# Patient Record
Sex: FEMALE | Race: WHITE | Employment: OTHER | URBAN - METROPOLITAN AREA
[De-identification: names, ages, dates, MRNs, and addresses within clinical notes are randomized per-mention and may not be internally consistent; named-entity substitution may affect disease eponyms.]

---

## 2017-01-24 DIAGNOSIS — M10.079 IDIOPATHIC GOUT OF FOOT, UNSPECIFIED CHRONICITY, UNSPECIFIED LATERALITY: ICD-10-CM

## 2017-01-24 RX ORDER — SIMVASTATIN 40 MG/1
TABLET, FILM COATED ORAL
Qty: 90 TAB | Refills: 0 | Status: SHIPPED | OUTPATIENT
Start: 2017-01-24 | End: 2017-05-12 | Stop reason: SDUPTHER

## 2017-01-24 RX ORDER — SITAGLIPTIN 100 MG/1
TABLET, FILM COATED ORAL
Qty: 90 TAB | Refills: 4 | Status: SHIPPED | OUTPATIENT
Start: 2017-01-24 | End: 2018-02-09 | Stop reason: SDUPTHER

## 2017-01-24 RX ORDER — ALLOPURINOL 100 MG/1
TABLET ORAL
Qty: 180 TAB | Refills: 2 | Status: SHIPPED | OUTPATIENT
Start: 2017-01-24 | End: 2018-03-29

## 2017-01-24 RX ORDER — FLUTICASONE PROPIONATE AND SALMETEROL 50; 250 UG/1; UG/1
POWDER RESPIRATORY (INHALATION)
Qty: 180 EACH | Refills: 2 | Status: SHIPPED | OUTPATIENT
Start: 2017-01-24 | End: 2018-02-26 | Stop reason: SDUPTHER

## 2017-03-06 RX ORDER — GABAPENTIN 300 MG/1
CAPSULE ORAL
Qty: 90 CAP | Refills: 0 | Status: SHIPPED | OUTPATIENT
Start: 2017-03-06 | End: 2017-04-19 | Stop reason: SDUPTHER

## 2017-04-19 RX ORDER — GABAPENTIN 300 MG/1
CAPSULE ORAL
Qty: 90 CAP | Refills: 0 | Status: SHIPPED | OUTPATIENT
Start: 2017-04-19 | End: 2017-04-24 | Stop reason: SDUPTHER

## 2017-04-24 RX ORDER — GABAPENTIN 300 MG/1
CAPSULE ORAL
Qty: 270 CAP | Refills: 0 | Status: SHIPPED | OUTPATIENT
Start: 2017-04-24 | End: 2019-01-23 | Stop reason: SDUPTHER

## 2017-05-12 RX ORDER — SIMVASTATIN 40 MG/1
TABLET, FILM COATED ORAL
Qty: 90 TAB | Refills: 0 | Status: SHIPPED | OUTPATIENT
Start: 2017-05-12 | End: 2017-08-21 | Stop reason: SDUPTHER

## 2017-08-03 RX ORDER — LISINOPRIL 2.5 MG/1
TABLET ORAL
Qty: 90 TAB | Refills: 0 | Status: SHIPPED | OUTPATIENT
Start: 2017-08-03 | End: 2017-11-14 | Stop reason: SDUPTHER

## 2017-08-21 RX ORDER — GABAPENTIN 300 MG/1
CAPSULE ORAL
Qty: 90 CAP | Refills: 0 | Status: SHIPPED | OUTPATIENT
Start: 2017-08-21 | End: 2017-09-19 | Stop reason: SDUPTHER

## 2017-08-21 RX ORDER — SIMVASTATIN 40 MG/1
TABLET, FILM COATED ORAL
Qty: 90 TAB | Refills: 0 | Status: SHIPPED | OUTPATIENT
Start: 2017-08-21 | End: 2017-11-18 | Stop reason: SDUPTHER

## 2017-09-19 RX ORDER — GABAPENTIN 300 MG/1
CAPSULE ORAL
Qty: 90 CAP | Refills: 0 | Status: SHIPPED | OUTPATIENT
Start: 2017-09-19 | End: 2017-10-11 | Stop reason: SDUPTHER

## 2017-10-11 RX ORDER — GABAPENTIN 300 MG/1
CAPSULE ORAL
Qty: 270 CAP | Refills: 3 | Status: SHIPPED | OUTPATIENT
Start: 2017-10-11 | End: 2018-02-16 | Stop reason: SDUPTHER

## 2017-10-19 RX ORDER — GABAPENTIN 300 MG/1
CAPSULE ORAL
Qty: 90 CAP | Refills: 0 | Status: SHIPPED | OUTPATIENT
Start: 2017-10-19 | End: 2017-12-22 | Stop reason: SDUPTHER

## 2017-11-14 ENCOUNTER — HOSPITAL ENCOUNTER (OUTPATIENT)
Dept: LAB | Age: 57
Discharge: HOME | End: 2017-11-14
Payer: MEDICARE

## 2017-11-14 ENCOUNTER — OFFICE VISIT (OUTPATIENT)
Dept: FAMILY MEDICINE CLINIC | Age: 57
End: 2017-11-14

## 2017-11-14 VITALS
BODY MASS INDEX: 33.86 KG/M2 | HEART RATE: 80 BPM | RESPIRATION RATE: 18 BRPM | DIASTOLIC BLOOD PRESSURE: 100 MMHG | HEIGHT: 62 IN | SYSTOLIC BLOOD PRESSURE: 170 MMHG | OXYGEN SATURATION: 96 % | WEIGHT: 184 LBS | TEMPERATURE: 98.6 F

## 2017-11-14 DIAGNOSIS — Z23 ENCOUNTER FOR IMMUNIZATION: ICD-10-CM

## 2017-11-14 DIAGNOSIS — L30.1 DYSHIDROTIC ECZEMA: ICD-10-CM

## 2017-11-14 DIAGNOSIS — R21 RASH: ICD-10-CM

## 2017-11-14 DIAGNOSIS — N32.81 OAB (OVERACTIVE BLADDER): ICD-10-CM

## 2017-11-14 DIAGNOSIS — R11.0 NAUSEA: ICD-10-CM

## 2017-11-14 DIAGNOSIS — J42 CHRONIC BRONCHITIS, UNSPECIFIED CHRONIC BRONCHITIS TYPE (HCC): ICD-10-CM

## 2017-11-14 DIAGNOSIS — E11.9 TYPE 2 DIABETES MELLITUS WITHOUT COMPLICATION, WITH LONG-TERM CURRENT USE OF INSULIN (HCC): Primary | ICD-10-CM

## 2017-11-14 DIAGNOSIS — I10 ESSENTIAL HYPERTENSION: ICD-10-CM

## 2017-11-14 DIAGNOSIS — N39.3 STRESS INCONTINENCE IN FEMALE: ICD-10-CM

## 2017-11-14 DIAGNOSIS — Z79.4 TYPE 2 DIABETES MELLITUS WITHOUT COMPLICATION, WITH LONG-TERM CURRENT USE OF INSULIN (HCC): Primary | ICD-10-CM

## 2017-11-14 DIAGNOSIS — G43.009 MIGRAINE WITHOUT AURA AND WITHOUT STATUS MIGRAINOSUS, NOT INTRACTABLE: ICD-10-CM

## 2017-11-14 DIAGNOSIS — R00.0 TACHYCARDIA: ICD-10-CM

## 2017-11-14 PROCEDURE — 82043 UR ALBUMIN QUANTITATIVE: CPT

## 2017-11-14 PROCEDURE — 80053 COMPREHEN METABOLIC PANEL: CPT

## 2017-11-14 PROCEDURE — 85025 COMPLETE CBC W/AUTO DIFF WBC: CPT

## 2017-11-14 PROCEDURE — 83036 HEMOGLOBIN GLYCOSYLATED A1C: CPT

## 2017-11-14 RX ORDER — LISINOPRIL 10 MG/1
TABLET ORAL
Qty: 30 TAB | Refills: 1 | Status: SHIPPED | OUTPATIENT
Start: 2017-11-14 | End: 2018-02-19 | Stop reason: SDUPTHER

## 2017-11-14 RX ORDER — BUTALBITAL, ACETAMINOPHEN AND CAFFEINE 50; 325; 40 MG/1; MG/1; MG/1
TABLET ORAL
Qty: 20 TAB | Refills: 2 | Status: SHIPPED | OUTPATIENT
Start: 2017-11-14 | End: 2018-03-29

## 2017-11-14 RX ORDER — CARVEDILOL 6.25 MG/1
TABLET ORAL 2 TIMES DAILY WITH MEALS
COMMUNITY
End: 2018-02-16 | Stop reason: SDUPTHER

## 2017-11-14 RX ORDER — TRIAMCINOLONE ACETONIDE 0.25 MG/G
CREAM TOPICAL 2 TIMES DAILY
Qty: 15 G | Refills: 0 | Status: SHIPPED | OUTPATIENT
Start: 2017-11-14 | End: 2018-04-20

## 2017-11-14 RX ORDER — ONDANSETRON 4 MG/1
4 TABLET, ORALLY DISINTEGRATING ORAL
Qty: 20 TAB | Refills: 1 | Status: SHIPPED | OUTPATIENT
Start: 2017-11-14 | End: 2018-03-29

## 2017-11-14 NOTE — PROGRESS NOTES
Jere Holland is a 64 y.o. female   Chief Complaint   Patient presents with    Medication Evaluation    pt states she has little blisters on her hands and feet which come and go. Pt has not been doing anything for them. Pt also reports nausea and has been going on past few days pt also feels a little congested in her throat. Pt also overdue for diabetes cl elizabeth states she has been managing, discussed proper follow up and pt states she had her son pass away so has not been following up due to this. Reports a Bp med was stopped in hospital at [facility] and will increase lisinopril. Diabetes has been well controlled. Pt also reports something wrong with kidneys and on further discussion pt states she sneezes or coughs and leaks urine. Pt also feels like she has to go to bathroom urinating frequently. Pt also reports her breathing has worsened and pt is using her advair and albuterol, discussed add on spiriva and pt would like to try this. Pt also urged to quit smoking. Given sample of spiriva and first dose self administered under my direction in office. Pt also gets migraine HA's and used fioricet in past with relief requesting refill. [place] [facility] [place] and \"other things\" per pt and we have not been receiving notes, pt would like to establish with cardio in this building on same ER, will refer  she is a 64y.o. year old female who presents for evalution. Reviewed PmHx, RxHx, FmHx, SocHx, AllgHx and updated and dated in the chart. Review of Systems - negative except as listed above in the HPI    Objective:     Vitals:    11/14/17 1500   BP: (!) 170/100   Pulse: 80   Resp: 18   Temp: 98.6 °F (37 °C)   TempSrc: Oral   SpO2: 96%   Weight: 184 lb (83.5 kg)   Height: 5' 2\" (1.575 m)       Current Outpatient Prescriptions   Medication Sig    carvedilol (COREG) 6.25 mg tablet Take  by mouth two (2) times daily (with meals).     triamcinolone acetonide (KENALOG) 0.025 % topical cream Apply  to affected area two (2) times a day. use thin layer    mirabegron ER (MYRBETRIQ) 25 mg ER tablet Take 1 Tab by mouth daily.  lisinopril (PRINIVIL, ZESTRIL) 10 mg tablet TAKE 1 TABLET BY MOUTH EVERY DAY    ondansetron (ZOFRAN ODT) 4 mg disintegrating tablet Take 1 Tab by mouth every eight (8) hours as needed for Nausea.  butalbital-acetaminophen-caffeine (FIORICET, ESGIC) -40 mg per tablet TAKE 1 TABLET BY MOUTH EVERY 6 HOURS AS NEEDED FOR HA, MUST LAST 30 DAYS    tiotropium bromide (SPIRIVA RESPIMAT) 2.5 mcg/actuation inhaler Take 2 Puffs by inhalation daily.  gabapentin (NEURONTIN) 300 mg capsule TAKE 1 CAPSULE BY MOUTH THREE TIMES DAILY    simvastatin (ZOCOR) 40 mg tablet TAKE 1 TABLET BY MOUTH EVERY EVENING    ipratropium-albuterol (COMBIVENT RESPIMAT)  mcg/actuation inhaler Take 1 Puff by inhalation every six (6) hours as needed for Wheezing (Disp 90 days).  JANUVIA 100 mg tablet TAKE 1 TABLET BY MOUTH DAILY    insulin glargine (TOUJEO SOLOSTAR) 300 unit/mL (1.5 mL) inpn 20 Units by SubCUTAneous route nightly.  insulin aspart (NOVOLOG) 100 unit/mL flexpen 10 units QAC SubQ    aspirin (ASPIRIN) 325 mg tablet Take 325 mg by mouth daily.  gabapentin (NEURONTIN) 300 mg capsule TAKE 1 CAPSULE BY MOUTH THREE TIMES DAILY    gabapentin (NEURONTIN) 300 mg capsule TAKE 1 CAPSULE BY MOUTH THREE TIMES DAILY    allopurinol (ZYLOPRIM) 100 mg tablet TAKE 1 TABLET BY MOUTH TWICE DAILY    ADVAIR DISKUS 250-50 mcg/dose diskus inhaler INHALE 1 PUFF BY MOUTH TWICE DAILY    cilostazol (PLETAL) 100 mg tablet TAKE 1 TABLET BY MOUTH DAILY    colchicine (COLCRYS) 0.6 mg tablet 2 tab at first sign of flare then another tab 1 hour later, once flare over take 1 tab daily    acetaminophen-codeine (TYLENOL #3) 300-30 mg per tablet Take 1 Tab by mouth every six (6) hours as needed for Pain. Max Daily Amount: 4 Tabs.     baclofen (LIORESAL) 20 mg tablet Take 1 Tab by mouth three (3) times daily as needed.  insulin lispro (HUMALOG KWIKPEN) 100 unit/mL kwikpen 10 units subQ QAC    HYDROcodone-acetaminophen (NORCO) 5-325 mg per tablet Take 1 Tab by mouth every eight (8) hours as needed for Pain. Max Daily Amount: 3 Tabs.  metFORMIN ER (GLUCOPHAGE XR) 500 mg tablet TAKE 2 TABLET BY MOUTH EVERY EVENING WITH DINNER    insulin NPH (NOVOLIN N, HUMULIN N) 100 unit/mL injection 6 Units by SubCUTAneous route Before breakfast and dinner.  Insulin Needles, Disposable, 31 gauge x 0/94\" ndle For application of insulin.  Blood-Glucose Meter monitoring kit Use as directed. Dispense 1 meter kit. 0 refills. Check twice a day, before each insulin dose.  Insulin Syringes, Disposable, 1 mL syrg 1 Syringe by Does Not Apply route two (2) times a day. Before breakfast and 12 hours later.  Lancets misc For use with glucose monitor.  ranitidine (ZANTAC) 150 mg tablet Take 150 mg by mouth two (2) times daily as needed for Indigestion (Heartburn).  gabapentin (NEURONTIN) 300 mg capsule Take 300 mg by mouth daily.  docusate sodium (COLACE) 100 mg capsule Take 100 mg by mouth daily.  simvastatin (ZOCOR) 40 mg tablet TAKE 1 TABLET BY MOUTH EVERY EVENING (Patient taking differently: Take 40 mg by mouth daily. TAKE 1 TABLET BY MOUTH EVERY EVENING)     No current facility-administered medications for this visit.         Physical Examination: General appearance - alert, well appearing, and in no distress  Mental status - alert, oriented to person, place, and time  Eyes - pupils equal and reactive, extraocular eye movements intact  Chest - clear to auscultation, no wheezes, rales or rhonchi, symmetric air entry  Heart - normal rate, regular rhythm, normal S1, S2, no murmurs, rubs, clicks or gallops  Abdomen - soft, nontender, nondistended, no masses or organomegaly  Extremities - peripheral pulses normal, no pedal edema, no clubbing or cyanosis  Skin - small vesicular lesions on b/l hands and a couple on feet with xerosis of skin      Assessment/ Plan:   Diagnoses and all orders for this visit:    1. Type 2 diabetes mellitus without complication, with long-term current use of insulin (HCC)  -     HEMOGLOBIN A1C WITH EAG  -     MICROALBUMIN, UR, RAND W/ MICROALBUMIN/CREA RATIO  -     METABOLIC PANEL, COMPREHENSIVE    2. Stress incontinence in female  -     mirabegron ER (MYRBETRIQ) 25 mg ER tablet; Take 1 Tab by mouth daily. Use pads  3. Dyshidrotic eczema  -     triamcinolone acetonide (KENALOG) 0.025 % topical cream; Apply  to affected area two (2) times a day. use thin layer  aveeno/cetaphil  4. Rash  -     CBC WITH AUTOMATED DIFF    5. OAB (overactive bladder)  -     mirabegron ER (MYRBETRIQ) 25 mg ER tablet; Take 1 Tab by mouth daily. 6. Essential hypertension  -     lisinopril (PRINIVIL, ZESTRIL) 10 mg tablet; TAKE 1 TABLET BY MOUTH EVERY DAY  -     [facility]    7. Nausea  -     ondansetron (ZOFRAN ODT) 4 mg disintegrating tablet; Take 1 Tab by mouth every eight (8) hours as needed for Nausea. 8. Migraine without aura and without status migrainosus, not intractable  -     butalbital-acetaminophen-caffeine (FIORICET, ESGIC) -40 mg per tablet; TAKE 1 TABLET BY MOUTH EVERY 6 HOURS AS NEEDED FOR HA, MUST LAST 30 DAYS    9. Encounter for immunization  -     Influenza virus vaccine (QUADRIVALENT PRES FREE SYRINGE) IM (09072)  -     Pneumococcal polysaccharide vaccine, 23-valent, adult or immunosuppressed pt dose  -     CA IMMUNIZ ADMIN,1 SINGLE/COMB VAC/TOXOID    10. Tachycardia  -     [place] Card [facility]    11. Chronic bronchitis, unspecified chronic bronchitis type (HCC)  -     tiotropium bromide (SPIRIVA RESPIMAT) 2.5 mcg/actuation inhaler; Take 2 Puffs by inhalation daily. Follow-up Disposition:  Return in about 3 weeks (around 12/5/2017), or if symptoms worsen or fail to improve.     I have discussed the diagnosis with the patient and the intended plan as seen in the above orders. The patient has received an after-visit summary and questions were answered concerning future plans. Pt conveyed understanding of plan.     Medication Side Effects and Warnings were discussed with patient      Emily Nichole DO

## 2017-11-14 NOTE — PATIENT INSTRUCTIONS

## 2017-11-14 NOTE — MR AVS SNAPSHOT
Visit Information Date & Time Provider Department Dept. Phone Encounter #  
 11/14/2017  2:40 PM Elizabeth Mays, Madhu S Eusebio Leone [phone] [id number] Upcoming Health Maintenance Date Due Pneumococcal 19-64 Medium Risk (1 of 1 - PPSV23) 12/28/1979 PAP AKA CERVICAL CYTOLOGY 12/28/1981 HEMOGLOBIN A1C Q6M 4/19/2017 LIPID PANEL Q1 4/20/2017 Influenza Age 5 to Adult 8/1/2017 BREAST CANCER SCRN MAMMOGRAM 9/23/2018 COLONOSCOPY 1/1/2024 DTaP/Tdap/Td series (2 - Td) 3/10/2025 Allergies as of 11/14/2017  Review Complete On: 11/14/2017 By: Elizabeth Mays DO Severity Noted Reaction Type Reactions Ciprofloxacin  05/26/2011    Nausea Only, Vertigo Current Immunizations  Reviewed on 10/19/2016 Name Date Influenza Vaccine (Quad) PF  Incomplete, 10/19/2016, 11/20/2015 Pneumococcal Polysaccharide (PPSV-23)  Incomplete Tdap 3/10/2015 Not reviewed this visit You Were Diagnosed With   
  
 Codes Comments Type 2 diabetes mellitus without complication, with long-term current use of insulin (HCC)    -  Primary ICD-10-CM: E11.9, Z79.4 ICD-9-CM: 250.00, V58.67 Stress incontinence in female     ICD-10-CM: N39.3 ICD-9-CM: 625.6 Dyshidrotic eczema     ICD-10-CM: L30.1 ICD-9-CM: 705.81 Rash     ICD-10-CM: R21 
ICD-9-CM: 782.1   
 OAB (overactive bladder)     ICD-10-CM: N32.81 ICD-9-CM: 596.51 Essential hypertension     ICD-10-CM: I10 
ICD-9-CM: 401.9 Nausea     ICD-10-CM: R11.0 ICD-9-CM: 787.02   
 Migraine without aura and without status migrainosus, not intractable     ICD-10-CM: I42.235 ICD-9-CM: 346.10 Encounter for immunization     ICD-10-CM: X86 ICD-9-CM: V03.89 Tachycardia     ICD-10-CM: R00.0 ICD-9-CM: 670. 0 Chronic bronchitis, unspecified chronic bronchitis type ([address]     ICD-10-CM: T63 ICD-9-CM: 491.9 Vitals BP Pulse Temp Resp Height(growth percentile) Weight(growth percentile) (!) 170/100 80 98.6 °F (37 °C) (Oral) 18 5' 2\" (1.575 m) 184 lb (83.5 kg) SpO2 BMI OB Status Smoking Status 96% 33.65 kg/m2 Postmenopausal Current Every Day Smoker Vitals History BMI and BSA Data Body Mass Index Body Surface Area  
 33.65 kg/m 2 1.91 m 2 Preferred Pharmacy Pharmacy Name Phone [address] [phone] Your Updated Medication List  
  
   
This list is accurate as of: 11/14/17  3:41 PM.  Always use your most recent med list.  
  
  
  
  
 acetaminophen-codeine 300-30 mg per tablet Commonly known as:  TYLENOL #3 Take 1 Tab by mouth every six (6) hours as needed for Pain. Max Daily Amount: 4 Tabs. ADVAIR DISKUS 250-50 mcg/dose diskus inhaler Generic drug:  fluticasone-salmeterol INHALE 1 PUFF BY MOUTH TWICE DAILY  
  
 allopurinol 100 mg tablet Commonly known as:  ZYLOPRIM  
TAKE 1 TABLET BY MOUTH TWICE DAILY  
  
 aspirin 325 mg tablet Commonly known as:  ASPIRIN Take 325 mg by mouth daily. baclofen 20 mg tablet Commonly known as:  LIORESAL Take 1 Tab by mouth three (3) times daily as needed. Blood-Glucose Meter monitoring kit Use as directed. Dispense 1 meter kit. 0 refills. Check twice a day, before each insulin dose. butalbital-acetaminophen-caffeine -40 mg per tablet Commonly known as:  FIORICET, ESGIC  
TAKE 1 TABLET BY MOUTH EVERY 6 HOURS AS NEEDED FOR HA, MUST LAST 30 DAYS  
  
 carvedilol 6.25 mg tablet Commonly known as:  [place]en You Take  by mouth two (2) times daily (with meals). cilostazol 100 mg tablet Commonly known as:  PLETAL  
TAKE 1 TABLET BY MOUTH DAILY COLACE 100 mg capsule Generic drug:  docusate sodium Take 100 mg by mouth daily. colchicine 0.6 mg tablet Commonly known as:  COLCRYS  
2 tab at first sign of flare then another tab 1 hour later, once flare over take 1 tab daily * gabapentin 300 mg capsule Commonly known as:  NEURONTIN Take 300 mg by mouth daily. * gabapentin 300 mg capsule Commonly known as:  NEURONTIN  
TAKE 1 CAPSULE BY MOUTH THREE TIMES DAILY * gabapentin 300 mg capsule Commonly known as:  NEURONTIN  
TAKE 1 CAPSULE BY MOUTH THREE TIMES DAILY * gabapentin 300 mg capsule Commonly known as:  NEURONTIN  
TAKE 1 CAPSULE BY MOUTH THREE TIMES DAILY HYDROcodone-acetaminophen 5-325 mg per tablet Commonly known as:  Adali Lopezene Take 1 Tab by mouth every eight (8) hours as needed for Pain. Max Daily Amount: 3 Tabs. insulin aspart 100 unit/mL Inpn Commonly known as:  [place]m 10 units QAC SubQ  
  
 insulin glargine 300 unit/mL (1.5 mL) Inpn Commonly known as:  TOUJEO SOLOSTAR  
20 Units by SubCUTAneous route nightly. insulin lispro 100 unit/mL kwikpen Commonly known as:  HumaLOG KwikPen 10 units subQ QAC Insulin Needles (Disposable) 31 gauge x 5/16\" Ndle For application of insulin. insulin  unit/mL injection Commonly known as:  NOVOLIN N, HUMULIN N  
6 Units by SubCUTAneous route Before breakfast and dinner. Insulin Syringes (Disposable) 1 mL Syrg 1 Syringe by Does Not Apply route two (2) times a day. Before breakfast and 12 hours later. ipratropium-albuterol  mcg/actuation inhaler Commonly known as:  [place] Take 1 Puff by inhalation every six (6) hours as needed for Wheezing (Disp 90 days). JANUVIA 100 mg tablet Generic drug:  SITagliptin TAKE 1 TABLET BY MOUTH DAILY Lancets [facility] For use with glucose monitor. lisinopril 10 mg tablet Commonly known as:  PRINIVIL, ZESTRIL  
TAKE 1 TABLET BY MOUTH EVERY DAY  
  
 metFORMIN  mg tablet Commonly known as:  GLUCOPHAGE XR  
TAKE 2 TABLET BY MOUTH EVERY EVENING WITH DINNER  
  
 mirabegron ER 25 mg ER tablet Commonly known as:  MYRBETRIQ  
 Take 1 Tab by mouth daily. ondansetron 4 mg disintegrating tablet Commonly known as:  ZOFRAN ODT Take 1 Tab by mouth every eight (8) hours as needed for Nausea. raNITIdine 150 mg tablet Commonly known as:  ZANTAC Take 150 mg by mouth two (2) times daily as needed for Indigestion (Heartburn). * simvastatin 40 mg tablet Commonly known as:  ZOCOR  
TAKE 1 TABLET BY MOUTH EVERY EVENING  
  
 * simvastatin 40 mg tablet Commonly known as:  ZOCOR  
TAKE 1 TABLET BY MOUTH EVERY EVENING  
  
 tiotropium bromide 2.5 mcg/actuation inhaler Commonly known as:  Gavi Holstein Take 2 Puffs by inhalation daily. triamcinolone acetonide 0.025 % topical cream  
Commonly known as:  KENALOG Apply  to affected area two (2) times a day. use thin layer * Notice: This list has 6 medication(s) that are the same as other medications prescribed for you. Read the directions carefully, and ask your doctor or other care provider to review them with you. Prescriptions Printed Refills  
 butalbital-acetaminophen-caffeine (FIORICET, ESGIC) -40 mg per tablet 2 Sig: TAKE 1 TABLET BY MOUTH EVERY 6 HOURS AS NEEDED FOR HA, MUST LAST 30 DAYS Class: Print Prescriptions Sent to Pharmacy Refills  
 triamcinolone acetonide (KENALOG) 0.025 % topical cream 0 Sig: Apply  to affected area two (2) times a day. use thin layer Class: Normal  
 Pharmacy: [facility] [address] [facility] [email or web address] Ph #: [phone] Route: Topical  
 mirabegron ER (MYRBETRIQ) 25 mg ER tablet 5 Sig: Take 1 Tab by mouth daily. Class: Normal  
 Pharmacy: [facility] [address] [facility] [email or web address] Ph #: [phone] Route: Oral  
 lisinopril (PRINIVIL, ZESTRIL) 10 mg tablet 1 Sig: TAKE 1 TABLET BY MOUTH EVERY DAY  Class: Normal  
 Pharmacy: [place] [address] Ph #: [phone]  
 ondansetron (ZOFRAN ODT) 4 mg disintegrating tablet 1 Sig: Take 1 Tab by mouth every eight (8) hours as needed for Nausea. Class: Normal  
 Pharmacy: [facility] [address] [facility] [place] Ph #: [phone] Route: Oral  
 tiotropium bromide (SPIRIVA RESPIMAT) 2.5 mcg/actuation inhaler 5 Sig: Take 2 Puffs by inhalation daily. Class: Normal  
 Pharmacy: [facility] [address] [facility] Ph #: [phone] Route: Inhalation We Performed the Following CBC WITH AUTOMATED DIFF [53949 CPT(R)] HEMOGLOBIN A1C WITH EAG [77268 CPT(R)] INFLUENZA VIRUS VAC QUAD,SPLIT,PRESV FREE SYRINGE IM [id number] CPT(R)] METABOLIC PANEL, COMPREHENSIVE [16747 CPT(R)] MICROALBUMIN, UR, RAND W/ MICROALBUMIN/CREA RATIO [id number] CPT(R)] PNEUMOCOCCAL POLYSACCHARIDE VACCINE, 23-VALENT, ADULT OR IMMUNOSUPPRESSED PT DOSE, [17330 CPT(R)] [place] IMMUNIZ ADMIN,1 SINGLE/COMB VAC/TOXOID [id number] CPT(R)] REFERRAL TO CARDIOLOGY [YBN26 Custom] Referral Information Referral ID Referred By Referred To  
  
 [id number] Krysat Kearns MD   
   [address] [place] [address] [phone] [place], [address] [place] Phone: [phone] Visits Status Start Date End Date 1 New Request 11/14/17 11/14/18 If your referral has a status of pending review or denied, additional information will be sent to support the outcome of this decision. Patient Instructions Stopping Smoking: Care Instructions Your Care Instructions Cigarette smokers crave the nicotine in cigarettes. Giving it up is much harder than simply changing a habit.  Your body has to stop craving the nicotine. It is hard to quit, but you can do it. There are many tools that people use to quit smoking. You may find that combining tools works best for you. There are several steps to quitting. First you get ready to quit. Then you get support to help you. After that, you learn new skills and behaviors to become a nonsmoker. For many people, a necessary step is getting and using medicine. Your doctor will help you set up the plan that best meets your needs. You may want to attend a smoking cessation program to help you quit smoking. When you choose a program, look for one that has proven success. Ask your doctor for ideas. You will greatly increase your chances of success if you take medicine as well as get counseling or join a cessation program. 
Some of the changes you feel when you first quit tobacco are uncomfortable. Your body will miss the nicotine at first, and you may feel short-tempered and grumpy. You may have trouble sleeping or concentrating. Medicine can help you deal with these symptoms. You may struggle with changing your smoking habits and rituals. The last step is the tricky one: Be prepared for the smoking urge to continue for a time. This is a lot to deal with, but keep at it. You will feel better. Follow-up care is a key part of your treatment and safety. Be sure to make and go to all appointments, and call your doctor if you are having problems. It's also a good idea to know your test results and keep a list of the medicines you take. How can you care for yourself at home? · Ask your family, friends, and coworkers for support. You have a better chance of quitting if you have help and support. · Join a support group, such as Nicotine Anonymous, for people who are trying to quit smoking. · Consider signing up for a smoking cessation program, such as the American Lung Association's Freedom from Smoking program. 
· Set a quit date.  Pick your date carefully so that it is not right in the middle of a big deadline or stressful time. Once you quit, do not even take a puff. Get rid of all ashtrays and lighters after your last cigarette. Clean your house and your clothes so that they do not smell of smoke. · Learn how to be a nonsmoker. Think about ways you can avoid those things that make you reach for a cigarette. ¨ Avoid situations that put you at greatest risk for smoking. For some people, it is hard to have a drink with friends without smoking. For others, they might skip a coffee break with coworkers who smoke. ¨ Change your daily routine. Take a different route to work or eat a meal in a different place. · Cut down on stress. Calm yourself or release tension by doing an activity you enjoy, such as reading a book, taking a hot bath, or gardening. · Talk to your doctor or pharmacist about nicotine replacement therapy, which replaces the nicotine in your body. You still get nicotine but you do not use tobacco. Nicotine replacement products help you slowly reduce the amount of nicotine you need. These products come in several forms, many of them available over-the-counter: ¨ Nicotine patches ¨ Nicotine gum and lozenges ¨ Nicotine inhaler · Ask your doctor about bupropion (Wellbutrin) or varenicline (Chantix), which are prescription medicines. They do not contain nicotine. They help you by reducing withdrawal symptoms, such as stress and anxiety. · Some people find hypnosis, acupuncture, and massage helpful for ending the smoking habit. · Eat a healthy diet and get regular exercise. Having healthy habits will help your body move past its craving for nicotine. · Be prepared to keep trying. Most people are not successful the first few times they try to quit. Do not get mad at yourself if you smoke again. Make a list of things you learned and think about when you want to try again, such as next week, next month, or next year. Where can you learn more? Go to http://lauro-oksana.info/. Enter W035 in the search box to learn more about \"Stopping Smoking: Care Instructions. \" Current as of: March 20, 2017 Content Version: 11.4 © 8898-0514 [employer] Care instructions adapted under license by [employer] (which disclaims liability or warranty for this information). If you have questions about a medical condition or this instruction, always ask your healthcare professional. [address] any warranty or liability for your use of this information. Introducing [facility]! Dear Esau Calvillo: Thank you for requesting a [employer] account. Our records indicate that you already have an active [employer] account. You can access your account anytime at [email or web address] Did you know that you can access your hospital and ER discharge instructions at any time in [employer]? You can also review all of your test results from your hospital stay or ER visit. Additional Information If you have questions, please visit the Frequently Asked Questions section of the [employer] website at [email or web address] Remember, [employer] is NOT to be used for urgent needs. For medical emergencies, dial 911. Now available from your iPhone and Android! Please provide this summary of care documentation to your next provider. Your primary care clinician is listed as Tracie Nj. If you have any questions after today's visit, please call [phone].

## 2017-11-15 DIAGNOSIS — E11.65 TYPE 2 DIABETES MELLITUS WITH HYPERGLYCEMIA, WITH LONG-TERM CURRENT USE OF INSULIN (HCC): ICD-10-CM

## 2017-11-15 DIAGNOSIS — Z79.4 TYPE 2 DIABETES MELLITUS WITH HYPERGLYCEMIA, WITH LONG-TERM CURRENT USE OF INSULIN (HCC): ICD-10-CM

## 2017-11-15 LAB
ALBUMIN SERPL-MCNC: 4 G/DL (ref 3.5–5.5)
ALBUMIN/CREAT UR: 381.2 MG/G CREAT (ref 0–30)
ALBUMIN/GLOB SERPL: 1.3 {RATIO} (ref 1.2–2.2)
ALP SERPL-CCNC: 98 IU/L (ref 39–117)
ALT SERPL-CCNC: 7 IU/L (ref 0–32)
AST SERPL-CCNC: 10 IU/L (ref 0–40)
BASOPHILS # BLD AUTO: 0 X10E3/UL (ref 0–0.2)
BASOPHILS NFR BLD AUTO: 0 %
BILIRUB SERPL-MCNC: 0.4 MG/DL (ref 0–1.2)
BUN SERPL-MCNC: 5 MG/DL (ref 6–24)
BUN/CREAT SERPL: 7 (ref 9–23)
CALCIUM SERPL-MCNC: 9.1 MG/DL (ref 8.7–10.2)
CHLORIDE SERPL-SCNC: 103 MMOL/L (ref 96–106)
CO2 SERPL-SCNC: 26 MMOL/L (ref 18–29)
CREAT SERPL-MCNC: 0.75 MG/DL (ref 0.57–1)
CREAT UR-MCNC: 134.7 MG/DL
EOSINOPHIL # BLD AUTO: 0.2 X10E3/UL (ref 0–0.4)
EOSINOPHIL NFR BLD AUTO: 2 %
ERYTHROCYTE [DISTWIDTH] IN BLOOD BY AUTOMATED COUNT: 13.2 % (ref 12.3–15.4)
EST. AVERAGE GLUCOSE BLD GHB EST-MCNC: 134 MG/DL
GFR SERPLBLD CREATININE-BSD FMLA CKD-EPI: 103 ML/MIN/1.73
GFR SERPLBLD CREATININE-BSD FMLA CKD-EPI: 89 ML/MIN/1.73
GLOBULIN SER CALC-MCNC: 3.1 G/DL (ref 1.5–4.5)
GLUCOSE SERPL-MCNC: 231 MG/DL (ref 65–99)
HBA1C MFR BLD: 6.3 % (ref 4.8–5.6)
HCT VFR BLD AUTO: 44 % (ref 34–46.6)
HGB BLD-MCNC: 14.9 G/DL (ref 11.1–15.9)
IMM GRANULOCYTES # BLD: 0 X10E3/UL (ref 0–0.1)
IMM GRANULOCYTES NFR BLD: 0 %
LYMPHOCYTES # BLD AUTO: 3.4 X10E3/UL (ref 0.7–3.1)
LYMPHOCYTES NFR BLD AUTO: 32 %
MCH RBC QN AUTO: 31 PG (ref 26.6–33)
MCHC RBC AUTO-ENTMCNC: 33.9 G/DL (ref 31.5–35.7)
MCV RBC AUTO: 92 FL (ref 79–97)
MICROALBUMIN UR-MCNC: 513.5 UG/ML
MONOCYTES # BLD AUTO: 0.5 X10E3/UL (ref 0.1–0.9)
MONOCYTES NFR BLD AUTO: 5 %
NEUTROPHILS # BLD AUTO: 6.2 X10E3/UL (ref 1.4–7)
NEUTROPHILS NFR BLD AUTO: 61 %
PLATELET # BLD AUTO: 241 X10E3/UL (ref 150–379)
POTASSIUM SERPL-SCNC: 3.9 MMOL/L (ref 3.5–5.2)
PROT SERPL-MCNC: 7.1 G/DL (ref 6–8.5)
RBC # BLD AUTO: 4.8 X10E6/UL (ref 3.77–5.28)
SODIUM SERPL-SCNC: 144 MMOL/L (ref 134–144)
WBC # BLD AUTO: 10.4 X10E3/UL (ref 3.4–10.8)

## 2017-11-15 RX ORDER — INSULIN ASPART 100 [IU]/ML
INJECTION, SOLUTION INTRAVENOUS; SUBCUTANEOUS
Qty: 5 PEN | Refills: 3 | Status: SHIPPED | OUTPATIENT
Start: 2017-11-15 | End: 2019-02-25 | Stop reason: SDUPTHER

## 2017-11-15 NOTE — PROGRESS NOTES
Your diabetes is stable but your urine is showing kidney damage from your diabetes but you are on lisinopril which is protective of your kidneys. You need to follow up with me in the next month to check your blood pressure.

## 2017-11-16 ENCOUNTER — DOCUMENTATION ONLY (OUTPATIENT)
Dept: FAMILY MEDICINE CLINIC | Age: 57
End: 2017-11-16

## 2017-11-16 NOTE — PROGRESS NOTES
Yenny ([id number]) form for Spiriva Respimat completed and placed on Dr. Henrietta Laguna desk for signature then fax.

## 2017-11-20 RX ORDER — SIMVASTATIN 40 MG/1
TABLET, FILM COATED ORAL
Qty: 90 TAB | Refills: 0 | Status: SHIPPED | OUTPATIENT
Start: 2017-11-20 | End: 2018-02-16 | Stop reason: SDUPTHER

## 2017-12-22 RX ORDER — GABAPENTIN 300 MG/1
CAPSULE ORAL
Qty: 90 CAP | Refills: 0 | Status: SHIPPED | OUTPATIENT
Start: 2017-12-22 | End: 2018-02-16 | Stop reason: SDUPTHER

## 2018-02-09 ENCOUNTER — TELEPHONE (OUTPATIENT)
Dept: FAMILY MEDICINE CLINIC | Age: 58
End: 2018-02-09

## 2018-02-09 NOTE — TELEPHONE ENCOUNTER
Pt called. Pt needs a script sent in for head lice. Please call pt at [phone]. Pt stated that she needs her [place] and [place] refilled too. PSR reminded pt that she was supposed to f/u 3wks from last appt. Pt stated that she didn't want to come in until after her appt with Cardio.

## 2018-02-16 ENCOUNTER — OFFICE VISIT (OUTPATIENT)
Dept: CARDIOLOGY CLINIC | Age: 58
End: 2018-02-16

## 2018-02-16 VITALS
HEART RATE: 76 BPM | DIASTOLIC BLOOD PRESSURE: 84 MMHG | BODY MASS INDEX: 34.57 KG/M2 | SYSTOLIC BLOOD PRESSURE: 150 MMHG | OXYGEN SATURATION: 95 % | WEIGHT: 189 LBS

## 2018-02-16 DIAGNOSIS — I10 ESSENTIAL HYPERTENSION: Primary | ICD-10-CM

## 2018-02-16 DIAGNOSIS — E78.00 HYPERCHOLESTEREMIA: ICD-10-CM

## 2018-02-16 DIAGNOSIS — R06.02 SOB (SHORTNESS OF BREATH): ICD-10-CM

## 2018-02-16 RX ORDER — CHOLECALCIFEROL (VITAMIN D3) 125 MCG
CAPSULE ORAL DAILY
COMMUNITY
End: 2020-06-23

## 2018-02-16 RX ORDER — ASPIRIN 81 MG/1
TABLET ORAL DAILY
COMMUNITY

## 2018-02-16 RX ORDER — CARVEDILOL 6.25 MG/1
12.5 TABLET ORAL 2 TIMES DAILY WITH MEALS
Qty: 60 TAB | Refills: 5 | Status: SHIPPED | OUTPATIENT
Start: 2018-02-16 | End: 2018-03-20 | Stop reason: DRUGHIGH

## 2018-02-16 RX ORDER — CLOPIDOGREL BISULFATE 75 MG/1
TABLET ORAL DAILY
COMMUNITY
End: 2018-03-30

## 2018-02-16 NOTE — PROGRESS NOTES
Pt unsure of medications she takes.  Asks her to give med list when she leaves    Pt complains of shortness of breath    Pt complains of lightheadedness     Pt complains of dizziness     Visit Vitals    /84 (BP 1 Location: Right arm, BP Patient Position: Sitting)    Pulse 76    Wt 189 lb (85.7 kg)    SpO2 95%    BMI 34.57 kg/m2

## 2018-02-16 NOTE — MR AVS SNAPSHOT
[address] [address] 
[phone] Patient: Christiano Jaime MRN: [id number] :1960 Visit Information Date & Time Provider Department Dept. Phone Encounter #  
 2018  3:00 PM Aparna Cuevas MD [facility] [phone] [id number] Follow-up Instructions Return in about 6 months (around 2018). Your Appointments 2018  2:00 PM  
ECHO CARDIOGRAMS 2D with VASCULAREDUARDO  
[facility] ([facility] SCHEDULING) Appt Note: echo dr Sotomayor Grain [address] [address]  
[phone]  
  
   
 [address] [address]  
  
    
 3/30/2018  1:00 PM  
ESTABLISHED PATIENT with Aparna Cuevas MD  
[facility] ([address]) Appt Note: 6 wk fu appt sll  
 [address] [address] [address]  
[phone]  
  
   
 [address] [address] [address] [address] Upcoming Health Maintenance Date Due  
 PAP AKA CERVICAL CYTOLOGY 1981 LIPID PANEL Q1 2017 HEMOGLOBIN A1C Q6M 2018 BREAST CANCER SCRN MAMMOGRAM 2018 COLONOSCOPY 2024 DTaP/Tdap/Td series (2 - Td) 3/10/2025 Allergies as of 2018  Review Complete On: 2018 By: Aparna Cuevas MD  
  
 Severity Noted Reaction Type Reactions Ciprofloxacin  2011    Nausea Only, Vertigo Current Immunizations  Reviewed on 2017 Name Date Influenza Vaccine (Quad) PF 2017, 10/19/2016, 2015 Pneumococcal Polysaccharide (PPSV-23) 2017 Tdap 3/10/2015 Not reviewed this visit You Were Diagnosed With   
  
 Codes Comments Essential hypertension    -  Primary ICD-10-CM: I10 
ICD-9-CM: 401.9 Hypercholesteremia     ICD-10-CM: E78.00 ICD-9-CM: 272.0   
 SOB (shortness of breath)     ICD-10-CM: R06.02 
 ICD-9-CM: 786.05 Vitals BP Pulse Weight(growth percentile) SpO2 BMI OB Status 150/84 (BP 1 Location: Right arm, BP Patient Position: Sitting) 76 189 lb (85.7 kg) 95% 34.57 kg/m2 Postmenopausal  
 Smoking Status Current Every Day Smoker Vitals History BMI and BSA Data Body Mass Index Body Surface Area 34.57 kg/m 2 1.94 m 2 Preferred Pharmacy Pharmacy Name Phone [address] [phone] Your Updated Medication List  
  
   
This list is accurate as of: 2/16/18  3:58 PM.  Always use your most recent med list.  
  
  
  
  
 acetaminophen-codeine 300-30 mg per tablet Commonly known as:  TYLENOL #3 Take 1 Tab by mouth every six (6) hours as needed for Pain. Max Daily Amount: 4 Tabs. ADVAIR DISKUS 250-50 mcg/dose diskus inhaler Generic drug:  fluticasone-salmeterol INHALE 1 PUFF BY MOUTH TWICE DAILY ALEVE 220 mg Cap Generic drug:  naproxen sodium Take  by mouth daily. Indications: for pain  
  
 allopurinol 100 mg tablet Commonly known as:  ZYLOPRIM  
TAKE 1 TABLET BY MOUTH TWICE DAILY * aspirin 325 mg tablet Commonly known as:  ASPIRIN Take 325 mg by mouth daily. * aspirin delayed-release 81 mg tablet Take  by mouth daily. baclofen 20 mg tablet Commonly known as:  LIORESAL Take 1 Tab by mouth three (3) times daily as needed. Blood-Glucose Meter monitoring kit Use as directed. Dispense 1 meter kit. 0 refills. Check twice a day, before each insulin dose. butalbital-acetaminophen-caffeine -40 mg per tablet Commonly known as:  FIORICET, ESGIC  
TAKE 1 TABLET BY MOUTH EVERY 6 HOURS AS NEEDED FOR HA, MUST LAST 30 DAYS  
  
 carvedilol 6.25 mg tablet Commonly known as:  Laura Pates Take 2 Tabs by mouth two (2) times daily (with meals). cilostazol 100 mg tablet Commonly known as:  PLETAL  
TAKE 1 TABLET BY MOUTH DAILY  
  
 clopidogrel 75 mg Tab Commonly known as:  PLAVIX Take  by mouth daily. COLACE 100 mg capsule Generic drug:  docusate sodium Take 100 mg by mouth daily. colchicine 0.6 mg tablet Commonly known as:  COLCRYS  
2 tab at first sign of flare then another tab 1 hour later, once flare over take 1 tab daily  
  
 gabapentin 300 mg capsule Commonly known as:  NEURONTIN  
TAKE 1 CAPSULE BY MOUTH THREE TIMES DAILY HYDROcodone-acetaminophen 5-325 mg per tablet Commonly known as:  Kim Chace Take 1 Tab by mouth every eight (8) hours as needed for Pain. Max Daily Amount: 3 Tabs. insulin aspart U-100 100 unit/mL Inpn Commonly known as:  Conception [place] 10 units QAC SubQ, dis 1 package  
  
 insulin glargine 300 unit/mL (1.5 mL) Inpn Commonly known as:  TOUJEO SOLOSTAR U-300 INSULIN  
20 Units by SubCUTAneous route nightly. insulin lispro 100 unit/mL kwikpen Commonly known as:  HumaLOG KwikPen Insulin 10 units subQ QAC Insulin Needles (Disposable) 31 gauge x 5/16\" Ndle For application of insulin. insulin  unit/mL injection Commonly known as:  NOVOLIN N, HUMULIN N  
6 Units by SubCUTAneous route Before breakfast and dinner. Insulin Syringes (Disposable) 1 mL Syrg 1 Syringe by Does Not Apply route two (2) times a day. Before breakfast and 12 hours later. ipratropium-albuterol  mcg/actuation inhaler Commonly known as:  Kylah Lasso Take 1 Puff by inhalation every six (6) hours as needed for Wheezing (Disp 90 days). Lancets [facility] For use with glucose monitor. lisinopril 10 mg tablet Commonly known as:  PRINIVIL, ZESTRIL  
TAKE 1 TABLET BY MOUTH EVERY DAY  
  
 metFORMIN  mg tablet Commonly known as:  GLUCOPHAGE XR  
TAKE 2 TABLET BY MOUTH EVERY EVENING WITH DINNER  
  
 mirabegron ER 25 mg ER tablet Commonly known as:  MYRBETRIQ  
 Take 1 Tab by mouth daily. MUCINEX FAST-MAX COLD-FLU-THRT 10- mg/20 mL Liqd Generic drug:  okitaowfh-[place]-dbpgjwig-guaifen Take  by mouth nightly. ondansetron 4 mg disintegrating tablet Commonly known as:  ZOFRAN ODT Take 1 Tab by mouth every eight (8) hours as needed for Nausea. raNITIdine 150 mg tablet Commonly known as:  ZANTAC Take 150 mg by mouth two (2) times daily as needed for Indigestion (Heartburn). simvastatin 40 mg tablet Commonly known as:  ZOCOR  
TAKE 1 TABLET BY MOUTH EVERY EVENING SITagliptin 100 mg tablet Commonly known as:  Deborah [place] TAKE 1 TABLET BY MOUTH DAILY  
  
 triamcinolone acetonide 0.025 % topical cream  
Commonly known as:  KENALOG Apply  to affected area two (2) times a day. use thin layer  
  
 umeclidinium 62.5 mcg/actuation inhaler Commonly known as:  INCRUSE ELLIPTA Take 1 Puff by inhalation daily. * Notice: This list has 2 medication(s) that are the same as other medications prescribed for you. Read the directions carefully, and ask your doctor or other care provider to review them with you. Prescriptions Sent to Pharmacy Refills  
 carvedilol (COREG) 6.25 mg tablet 5 Sig: Take 2 Tabs by mouth two (2) times daily (with meals). Class: Normal  
 Pharmacy: [facility] [address] [facility] #: [phone] Route: Oral  
  
We Performed the Following AMB POC EKG ROUTINE W/ 12 LEADS, INTER & REP [38209 CPT(R)] BNP [id number] CPT(R)] HEPATIC FUNCTION PANEL [72835 CPT(R)] LIPID PANEL [54123 CPT(R)] Follow-up Instructions Return in about 6 months (around 8/16/2018). To-Do List   
 03/09/2018 ECHO:  2D ECHO COMPLETE ADULT (TTE) W OR WO CONTR   
  
 03/09/2018 Cardiac Services:  LOOP MONITOR Introducing \[facility]\"" & HEALTH SERVICES! Dear Nicky Robert: Thank you for requesting a [employer]â€™[employer] account. Our records indicate that you already have an active [employer]â€™[employer] account. You can access your account anytime at [email or web address] Did you know that you can access your hospital and ER discharge instructions at any time in [employer]? You can also review all of your test results from your hospital stay or ER visit. Additional Information If you have questions, please visit the Frequently Asked Questions section of the [employer]â€™[employer] website at [email or web address] Remember, [employer] is NOT to be used for urgent needs. For medical emergencies, dial 911. Now available from your iPhone and Android! Please provide this summary of care documentation to your next provider. Your primary care clinician is listed as Ted Wagner. If you have any questions after today's visit, please call [phone].

## 2018-02-16 NOTE — PROGRESS NOTES
LAST OFFICE VISIT : 12/22/2017        ICD-10-CM ICD-9-CM   1. Essential hypertension I10 401.9            Alverna Sandifer is a 62 y.o. female with diabetes, hypertension, dyslipidemia referred for overdue follow up. Cardiac risk factors: smoking/ tobacco exposure, dyslipidemia, diabetes mellitus, obesity, sedentary life style, hypertension, post-menopausal  I have personally obtained the history from the patient. HISTORY OF PRESENTING ILLNESS     The pt is complaining today of shortness of breath as well as some lightheadedness and dizziness. She reports that she was being seen at [facility] [place] because she was short of breath. The pt states that she was given a breathing treatment and had cardiac testing done. This occurred in July and she had cardiac cath on 7/31/17. Her subclavian stent was ballooned open. She had some SVT during that procedure and referred her to EP. She did not go see them. The pt states that she does feel some tachycardia. She denies pain in her legs while walking. The pt states that her shortness of breath has worsened since August. She reports that she is short of breath when laying flat. The pt states that she is still smoking. She states that she is not seeing her dentist.     The patient denies chest pain, orthopnea, PND, LE edema, palpitations, syncope, presyncope or fatigue.          ACTIVE PROBLEM LIST     Patient Active Problem List    Diagnosis Date Noted    Type 2 diabetes mellitus with hyperglycemia ([facility] 11/20/2015    Microalbuminuria 11/20/2015    Obesity (BMI 30-39.9) 11/15/2015    PVD (peripheral vascular disease) ([facility] 11/15/2015    Hyperglycemia due to type 2 diabetes mellitus ([facility] 11/15/2015    Tobacco abuse 11/15/2015    Diabetic neuropathy ([facility] 03/10/2015    Migraine 03/10/2015    Hypercholesteremia 03/10/2015    Sebaceous cyst 03/08/2011    COPD (chronic obstructive pulmonary disease) ([facility] 01/17/2011    Depression 01/17/2011    DM (diabetes mellitus) ([facility] 01/03/2011    HTN (hypertension) 01/03/2011           PAST MEDICAL HISTORY     Past Medical History:   Diagnosis Date    Aneurysm ([facility]     cerebral    Anxiety     Asthma     Depression     Diabetes ([facility]     Headache(784.0)     Hypercholesterolemia     Hypertension     Other ill-defined conditions(799.89)     high cholesterol    Other ill-defined conditions(799.89)     pvd    Stroke ([facility] 2006    no residual           PAST SURGICAL HISTORY     Past Surgical History:   Procedure Laterality Date    ANEURYSM, INTRACRAN, SIMPLE SURG      HX GYN      HX OTHER SURGICAL      excision cyst/bilateral axilla          ALLERGIES     Allergies   Allergen Reactions    Ciprofloxacin Nausea Only and Vertigo          FAMILY HISTORY     Family History   Problem Relation Age of Onset    Hypertension Mother     Cancer Father     Breast Cancer Maternal Aunt     Breast Cancer Maternal Aunt     negative for cardiac disease       SOCIAL HISTORY     Social History     Social History    Marital status: SINGLE     Spouse name: N/A    Number of children: N/A    Years of education: N/A     Social History Main Topics    Smoking status: Current Every Day Smoker     Packs/day: 1.00     Years: 35.00    Smokeless tobacco: Never Used    Alcohol use Yes      Comment: occasional    Drug use: No    Sexual activity: Not on file     Other Topics Concern    Not on file     Social History Narrative         MEDICATIONS     Current Outpatient Prescriptions   Medication Sig    clopidogrel (PLAVIX) 75 mg tab Take  by mouth daily.  aspirin delayed-release 81 mg tablet Take  by mouth daily.  naproxen sodium (ALEVE) 220 mg cap Take  by mouth daily. Indications: for pain    qaurehhoz-DH-xlgmwalw-guaifen (MUCINEX FAST-MAX COLD-FLU-THRT) 10- mg/20 mL liqd Take  by mouth nightly.     SITagliptin (JANUVIA) 100 mg tablet TAKE 1 TABLET BY MOUTH DAILY    umeclidinium (INCRUSE ELLIPTA) 62.5 mcg/actuation inhaler Take 1 Puff by inhalation daily.  carvedilol (COREG) 6.25 mg tablet Take  by mouth two (2) times daily (with meals).  triamcinolone acetonide (KENALOG) 0.025 % topical cream Apply  to affected area two (2) times a day. use thin layer    lisinopril (PRINIVIL, ZESTRIL) 10 mg tablet TAKE 1 TABLET BY MOUTH EVERY DAY    ondansetron (ZOFRAN ODT) 4 mg disintegrating tablet Take 1 Tab by mouth every eight (8) hours as needed for Nausea.  butalbital-acetaminophen-caffeine (FIORICET, ESGIC) -40 mg per tablet TAKE 1 TABLET BY MOUTH EVERY 6 HOURS AS NEEDED FOR HA, MUST LAST 30 DAYS    gabapentin (NEURONTIN) 300 mg capsule TAKE 1 CAPSULE BY MOUTH THREE TIMES DAILY    allopurinol (ZYLOPRIM) 100 mg tablet TAKE 1 TABLET BY MOUTH TWICE DAILY (Patient taking differently: TAKE 1 TABLET BY MOUTH TWICE DAILY AS NEEDED)    ipratropium-albuterol (COMBIVENT RESPIMAT)  mcg/actuation inhaler Take 1 Puff by inhalation every six (6) hours as needed for Wheezing (Disp 90 days).  ADVAIR DISKUS 250-50 mcg/dose diskus inhaler INHALE 1 PUFF BY MOUTH TWICE DAILY    Insulin Needles, Disposable, 31 gauge x 9/34\" ndle For application of insulin.  Blood-Glucose Meter monitoring kit Use as directed. Dispense 1 meter kit. 0 refills. Check twice a day, before each insulin dose.  Insulin Syringes, Disposable, 1 mL syrg 1 Syringe by Does Not Apply route two (2) times a day. Before breakfast and 12 hours later.  Lancets misc For use with glucose monitor.  ranitidine (ZANTAC) 150 mg tablet Take 150 mg by mouth two (2) times daily as needed for Indigestion (Heartburn).  simvastatin (ZOCOR) 40 mg tablet TAKE 1 TABLET BY MOUTH EVERY EVENING (Patient taking differently: Take 40 mg by mouth daily. TAKE 1 TABLET BY MOUTH EVERY EVENING)    insulin aspart (NOVOLOG) 100 unit/mL inpn 10 units QAC SubQ, dis 1 package    mirabegron ER (MYRBETRIQ) 25 mg ER tablet Take 1 Tab by mouth daily.     cilostazol (PLETAL) 100 mg tablet TAKE 1 TABLET BY MOUTH DAILY    colchicine (COLCRYS) 0.6 mg tablet 2 tab at first sign of flare then another tab 1 hour later, once flare over take 1 tab daily    acetaminophen-codeine (TYLENOL #3) 300-30 mg per tablet Take 1 Tab by mouth every six (6) hours as needed for Pain. Max Daily Amount: 4 Tabs.  baclofen (LIORESAL) 20 mg tablet Take 1 Tab by mouth three (3) times daily as needed.  insulin glargine (TOUJEO SOLOSTAR) 300 unit/mL (1.5 mL) inpn 20 Units by SubCUTAneous route nightly.  insulin lispro (HUMALOG KWIKPEN) 100 unit/mL kwikpen 10 units subQ QAC    HYDROcodone-acetaminophen (NORCO) 5-325 mg per tablet Take 1 Tab by mouth every eight (8) hours as needed for Pain. Max Daily Amount: 3 Tabs.  metFORMIN ER (GLUCOPHAGE XR) 500 mg tablet TAKE 2 TABLET BY MOUTH EVERY EVENING WITH DINNER    insulin NPH (NOVOLIN N, HUMULIN N) 100 unit/mL injection 6 Units by SubCUTAneous route Before breakfast and dinner.  aspirin (ASPIRIN) 325 mg tablet Take 325 mg by mouth daily.  docusate sodium (COLACE) 100 mg capsule Take 100 mg by mouth daily. No current facility-administered medications for this visit. I have reviewed the nurses notes, vitals, problem list, allergy list, medical history, family, social history and medications. REVIEW OF SYMPTOMS      General: Pt denies excessive weight gain or loss. Pt is able to conduct ADL's  HEENT: Denies blurred vision, headaches, hearing loss, epistaxis and difficulty swallowing. Respiratory: Denies cough, congestion, shortness of breath, FLORES, wheezing or stridor.   Cardiovascular: Denies precordial pain, palpitations, edema or PND  Gastrointestinal: Denies poor appetite, indigestion, abdominal pain or blood in stool  Genitourinary: Denies hematuria, dysuria, increased urinary frequency  Musculoskeletal: Denies joint pain or swelling from muscles or joints  Neurologic: Denies tremor, paresthesias, headache, or sensory motor disturbance  Psychiatric: Denies confusion, insomnia, depression  Integumentray: Denies rash, itching or ulcers. Hematologic: Denies easy bruising, bleeding     PHYSICAL EXAMINATION      Vitals:    02/16/18 1457   BP: 150/84   Pulse: 76   SpO2: 95%   Weight: 189 lb (85.7 kg)     General: Well developed, in no acute distress. deconditioned  HEENT: No jaundice, oral mucosa moist, no oral ulcers  Neck: Supple, no stiffness, no lymphadenopathy, supple  Heart:  Normal S1/S2 negative S3 or S4. Regular, no murmur, gallop or rub, no jugular venous distention  Respiratory: Clear bilaterally x 4, no wheezing or rales  Abdomen:   Soft, non-tender, bowel sounds are active.   Extremities:  No edema, normal cap refill, no cyanosis. Musculoskeletal: No clubbing, no deformities  Neuro: A&Ox3, speech clear, gait stable, cooperative, no focal neurologic deficits  Skin: Skin color is normal. No rashes or lesions. Non diaphoretic, moist.  Vascular: 2+ pulses symmetric in all extremities        EKG:      DIAGNOSTIC DATA     1. Echo  8/27/15- EF 65%    2. Cath  8/27/15-  EF 60 %., LAD luminal irreg. 20%, LCX minor luminal irreg. RCA ok, PDA 30 and 40%    Left leg disease seen on the abdominal aortic injeciton up to 80%. Right subclavian: There was a 95 % stenosis. 3. Lipids  4/20/16- , HDL 26, LDL 86, Tg 167      4. Carotid Doppler  8/27/15- 10-49% L/R, subclavian steal syndrome indicated due to reversal of vertebral artery flow.          LABORATORY DATA            Lab Results   Component Value Date/Time    WBC 10.4 11/14/2017 03:44 PM    HGB 14.9 11/14/2017 03:44 PM    HCT 44.0 11/14/2017 03:44 PM    PLATELET 251 27/67/6037 03:44 PM    MCV 92 11/14/2017 03:44 PM      Lab Results   Component Value Date/Time    Sodium 144 11/14/2017 03:44 PM    Potassium 3.9 11/14/2017 03:44 PM    Chloride 103 11/14/2017 03:44 PM    CO2 26 11/14/2017 03:44 PM    Anion gap 7 11/16/2015 03:30 AM    Glucose 231 (H) 11/14/2017 03:44 PM    BUN 5 (L) 11/14/2017 03:44 PM    Creatinine 0.75 11/14/2017 03:44 PM    BUN/Creatinine ratio 7 (L) 11/14/2017 03:44 PM    GFR est  11/14/2017 03:44 PM    GFR est non-AA 89 11/14/2017 03:44 PM    Calcium 9.1 11/14/2017 03:44 PM    Bilirubin, total 0.4 11/14/2017 03:44 PM    AST (SGOT) 10 11/14/2017 03:44 PM    Alk. phosphatase 98 11/14/2017 03:44 PM    Protein, total 7.1 11/14/2017 03:44 PM    Albumin 4.0 11/14/2017 03:44 PM    Globulin 3.7 11/15/2015 05:50 PM    A-G Ratio 1.3 11/14/2017 03:44 PM    ALT (SGPT) 7 11/14/2017 03:44 PM           ASSESSMENT/RECOMMENDATIONS:.      1. SVT  - she did have a hx of SVT during her catheretization at [facility] but never got a follow up she states that she does still feel some abnormality at times. I favor placing a loop monitor on her and if there is any abnormality she will need to see Dr. Cadence Boggs. 2. Subclavian stenting ballooned open on 7/31/17  - Is currently not complaining of any arm pain hopefully will remain patent even though she continues to smoke, I told her this would come back if she continues to live this lifestyle. 3. CAD  - She does have some intermediate CAD and she states that she was cathed at [facility] and I am unclear if it was a heart cath though she states it was so we will get those records so as to determine whether or not she needs stress test or not I will check an echo for her shortness of breath. 4. Shortness of breath  - it may be from deconditioning, from her tobacco use or her hypertension, I cannot focus in on any one thing imparticular. I will obtain records from [facility] and order an echo. I strongly feel that she may need to be seen by a pulmonologist for her tobacco use. 5. Claudication  - She does have some PVD but is not complaining of any leg pain at this time. 6. Diabetes  - Last HGB A1C was 6.3%, it seems like her diabetes is under good control but she needs to exercise and eat a healthy diet and we talked about this.    7. Tobacco dependency  - She is smoking 1 pack a day and I advised her to stop smoking. I asked her if she wanted to live to see her grandchildren graduate from college and I instructed her that it will be essential that she stop smoking if she wants to lead a long life, she has absolutely no room to move on this. 8. Hypertension  - Her blood pressure is elevated in clinic today, upon recheck this was 160/90. I favor increasing her Coreg to 12.5 BID. I will have her get her BP rechecked at the time of her echo. 9. Dyslipidemia  - Lipids are close to goal as I favor an LDL of 70 with her underlying diabetes. I have given her a lab slip to have these checked. 10.Return in 6 weeks or PRN. Orders Placed This Encounter    AMB POC EKG ROUTINE W/ 12 LEADS, INTER & REP     Order Specific Question:   Reason for Exam:     Answer:   htn    clopidogrel (PLAVIX) 75 mg tab     Sig: Take  by mouth daily.  aspirin delayed-release 81 mg tablet     Sig: Take  by mouth daily.  naproxen sodium (ALEVE) 220 mg cap     Sig: Take  by mouth daily. Indications: for pain    vrtchtois-JQ-qnnctmpz-guaifen (MUCINEX FAST-MAX COLD-FLU-THRT) 10- mg/20 mL liqd     Sig: Take  by mouth nightly. Follow-up Disposition:  Return in about 6 months (around 8/16/2018). I have discussed the diagnosis with  Dillon Sierra and the intended plan as seen in the above orders. Questions were answered concerning future plans. I have discussed medication side effects and warnings with the patient as well. Thank you,  [facility],  for involving me in the care of  Dillon Sierra. Please do not hesitate to contact me for further questions/concerns. Written by Winston Flores, as dictated by Roxann Ashley MD.     Madhavi Butts MD, [address]      [facility], [address] [place] [address], [address] [place] [address] Drive      [phone] / [phone] Fax

## 2018-02-19 ENCOUNTER — HOSPITAL ENCOUNTER (OUTPATIENT)
Dept: LAB | Age: 58
Discharge: HOME | End: 2018-02-19
Payer: MEDICARE

## 2018-02-19 DIAGNOSIS — I10 ESSENTIAL HYPERTENSION: ICD-10-CM

## 2018-02-19 PROCEDURE — 80076 HEPATIC FUNCTION PANEL: CPT

## 2018-02-19 PROCEDURE — 83880 ASSAY OF NATRIURETIC PEPTIDE: CPT

## 2018-02-19 PROCEDURE — 36415 COLL VENOUS BLD VENIPUNCTURE: CPT

## 2018-02-19 PROCEDURE — 80061 LIPID PANEL: CPT

## 2018-02-19 RX ORDER — LISINOPRIL 10 MG/1
TABLET ORAL
Qty: 30 TAB | Refills: 5 | Status: SHIPPED | OUTPATIENT
Start: 2018-02-19 | End: 2018-03-20 | Stop reason: SDUPTHER

## 2018-02-20 LAB
ALBUMIN SERPL-MCNC: 3.9 G/DL (ref 3.5–5.5)
ALP SERPL-CCNC: 80 IU/L (ref 39–117)
ALT SERPL-CCNC: 7 IU/L (ref 0–32)
AST SERPL-CCNC: 13 IU/L (ref 0–40)
BILIRUB DIRECT SERPL-MCNC: 0.13 MG/DL (ref 0–0.4)
BILIRUB SERPL-MCNC: 0.4 MG/DL (ref 0–1.2)
BNP SERPL-MCNC: 232.3 PG/ML (ref 0–100)
CHOLEST SERPL-MCNC: 159 MG/DL (ref 100–199)
HDLC SERPL-MCNC: 32 MG/DL
LDLC SERPL CALC-MCNC: 88 MG/DL (ref 0–99)
PROT SERPL-MCNC: 6.9 G/DL (ref 6–8.5)
TRIGL SERPL-MCNC: 193 MG/DL (ref 0–149)
VLDLC SERPL CALC-MCNC: 39 MG/DL (ref 5–40)

## 2018-02-20 RX ORDER — SIMVASTATIN 40 MG/1
TABLET, FILM COATED ORAL
Qty: 90 TAB | Refills: 0 | Status: SHIPPED | OUTPATIENT
Start: 2018-02-20 | End: 2018-03-20

## 2018-02-22 ENCOUNTER — CLINICAL SUPPORT (OUTPATIENT)
Dept: CARDIOLOGY CLINIC | Age: 58
End: 2018-02-22

## 2018-02-22 DIAGNOSIS — I10 ESSENTIAL HYPERTENSION: ICD-10-CM

## 2018-02-22 DIAGNOSIS — R06.02 SOB (SHORTNESS OF BREATH): ICD-10-CM

## 2018-02-22 DIAGNOSIS — E78.00 HYPERCHOLESTEREMIA: ICD-10-CM

## 2018-02-22 NOTE — PROGRESS NOTES
,The patient was identified by name and date of birth. The test was explained to patient and questions were answered prior to testing.

## 2018-02-26 ENCOUNTER — DOCUMENTATION ONLY (OUTPATIENT)
Dept: CARDIOLOGY CLINIC | Age: 58
End: 2018-02-26

## 2018-02-26 RX ORDER — FLUTICASONE PROPIONATE AND SALMETEROL 50; 250 UG/1; UG/1
POWDER RESPIRATORY (INHALATION)
Qty: 3 EACH | Refills: 3 | Status: SHIPPED | OUTPATIENT
Start: 2018-02-26 | End: 2020-02-13 | Stop reason: SDUPTHER

## 2018-02-26 NOTE — PROGRESS NOTES
Echocardiogram    EF down slightly to 45% MR is mild and there is now mild AI    Pt.  Will see me on 3/30/18 and keep appointment    Let her know    Mary Deal MD

## 2018-03-14 ENCOUNTER — PATIENT OUTREACH (OUTPATIENT)
Dept: FAMILY MEDICINE CLINIC | Age: 58
End: 2018-03-14

## 2018-03-14 NOTE — PROGRESS NOTES
[address]  [phone]  Fax [phone]    Patient Name: Jasper Esposito  YOB: 1960    3/14/18, patient listed on Outpatient Nurse Navigator(NN) daily census report and is currently at [facility]. NN and [facility] access was able to confirm admission on 3/11/18 to [facility] for Acute respiratory failure, COPD exacerbation and Afib RVR. Patient last seen at [facility] by Dr Mildred Garnica on 11/14/17. Patient remains inpatient at this time.   NN will continue to monitor for discharge plans.

## 2018-03-20 ENCOUNTER — OFFICE VISIT (OUTPATIENT)
Dept: FAMILY MEDICINE CLINIC | Age: 58
End: 2018-03-20

## 2018-03-20 VITALS
TEMPERATURE: 98.5 F | OXYGEN SATURATION: 95 % | RESPIRATION RATE: 18 BRPM | BODY MASS INDEX: 34.04 KG/M2 | WEIGHT: 185 LBS | HEART RATE: 85 BPM | DIASTOLIC BLOOD PRESSURE: 80 MMHG | SYSTOLIC BLOOD PRESSURE: 102 MMHG | HEIGHT: 62 IN

## 2018-03-20 DIAGNOSIS — I10 ESSENTIAL HYPERTENSION: ICD-10-CM

## 2018-03-20 DIAGNOSIS — I25.10 CORONARY ARTERY DISEASE INVOLVING NATIVE CORONARY ARTERY OF NATIVE HEART WITHOUT ANGINA PECTORIS: ICD-10-CM

## 2018-03-20 DIAGNOSIS — J44.1 COPD EXACERBATION (HCC): ICD-10-CM

## 2018-03-20 DIAGNOSIS — M94.0 ACUTE COSTOCHONDRITIS: ICD-10-CM

## 2018-03-20 DIAGNOSIS — Z71.6 ENCOUNTER FOR TOBACCO USE CESSATION COUNSELING: ICD-10-CM

## 2018-03-20 DIAGNOSIS — I48.91 ATRIAL FIBRILLATION, UNSPECIFIED TYPE (HCC): Primary | ICD-10-CM

## 2018-03-20 RX ORDER — DILTIAZEM HYDROCHLORIDE 240 MG/1
240 CAPSULE, COATED, EXTENDED RELEASE ORAL DAILY
COMMUNITY
Start: 2018-03-20 | End: 2018-03-30

## 2018-03-20 RX ORDER — LISINOPRIL 5 MG/1
TABLET ORAL
Qty: 30 TAB | Refills: 5 | Status: SHIPPED | OUTPATIENT
Start: 2018-03-20 | End: 2018-09-26 | Stop reason: SDUPTHER

## 2018-03-20 RX ORDER — IBUPROFEN 200 MG
1 TABLET ORAL EVERY 24 HOURS
Qty: 30 PATCH | Refills: 0 | Status: SHIPPED | OUTPATIENT
Start: 2018-03-20 | End: 2018-03-30

## 2018-03-20 RX ORDER — CARVEDILOL 25 MG/1
25 TABLET ORAL 2 TIMES DAILY WITH MEALS
COMMUNITY
Start: 2018-03-20 | End: 2018-03-30

## 2018-03-20 RX ORDER — ACETAMINOPHEN AND CODEINE PHOSPHATE 300; 30 MG/1; MG/1
1 TABLET ORAL
Qty: 15 TAB | Refills: 0 | Status: SHIPPED | OUTPATIENT
Start: 2018-03-20 | End: 2018-04-06

## 2018-03-20 NOTE — MR AVS SNAPSHOT
[address] [address] [phone] Patient: Jasper Esposito MRN: [id number] :[id number] Visit Information Date & Time Provider Department Dept. Phone Encounter #  
 3/20/2018  3:45 PM [address] [phone] [id number] Follow-up Instructions Return if symptoms worsen or fail to improve. Your Appointments 3/30/2018  1:00 PM  
ESTABLISHED PATIENT with Flori Nguyen MD  
[facility] (LEVAR SCHEDULING) Appt Note: 6 wk fu appt sll  
 [address] [address] [address] [phone]  
  
   
 [address] [address] [address] Upcoming Health Maintenance Date Due  
 PAP AKA CERVICAL CYTOLOGY 1981 MEDICARE YEARLY EXAM 3/14/2018 HEMOGLOBIN A1C Q6M 2018 BREAST CANCER SCRN MAMMOGRAM 2018 LIPID PANEL Q1 2019 COLONOSCOPY 2024 DTaP/Tdap/Td series (2 - Td) 3/10/2025 Allergies as of 3/20/2018  Review Complete On: 3/20/2018 By: John Katz LPN Severity Noted Reaction Type Reactions Ciprofloxacin  2011    Nausea Only, Vertigo Current Immunizations  Reviewed on 2017 Name Date Influenza Vaccine (Quad) PF 2017, 10/19/2016, 2015 Pneumococcal Polysaccharide (PPSV-23) 2017 Tdap 3/10/2015 Not reviewed this visit You Were Diagnosed With   
  
 Codes Comments Atrial fibrillation, unspecified type ([address]    -  Primary ICD-10-CM: I48.91 
ICD-9-CM: 427.31   
 COPD exacerbation ([facility]     ICD-10-CM: J44.1 ICD-9-CM: 491.21 Essential hypertension     ICD-10-CM: I10 
ICD-9-CM: 401.9 Encounter for tobacco use cessation counseling     ICD-10-CM: Z71.6 ICD-9-CM: V65.42 Vitals BP Pulse Temp Resp Height(growth percentile) Weight(growth percentile) 102/80 85 98.5 °F (36.9 °C) (Oral) 18 5' 2\" (1.575 m) 185 lb (83.9 kg) SpO2 BMI OB Status Smoking Status 95% 33.84 kg/m2 Postmenopausal Current Every Day Smoker Vitals History BMI and BSA Data Body Mass Index Body Surface Area  
 33.84 kg/m 2 1.92 m 2 Preferred Pharmacy Pharmacy Name Phone [facility] [phone] Your Updated Medication List  
  
   
This list is accurate as of 3/20/18  5:16 PM.  Always use your most recent med list.  
  
  
  
  
 acetaminophen-codeine 300-30 mg per tablet Commonly known as:  TYLENOL #3 Take 1 Tab by mouth every six (6) hours as needed for Pain. Max Daily Amount: 4 Tabs. ADVAIR DISKUS 250-50 mcg/dose diskus inhaler Generic drug:  fluticasone-salmeterol INHALE 1 PUFF BY MOUTH TWICE DAILY ALEVE 220 mg Cap Generic drug:  naproxen sodium Take  by mouth daily. Indications: for pain  
  
 allopurinol 100 mg tablet Commonly known as:  ZYLOPRIM  
TAKE 1 TABLET BY MOUTH TWICE DAILY * aspirin 325 mg tablet Commonly known as:  ASPIRIN Take 325 mg by mouth daily. * aspirin delayed-release 81 mg tablet Take  by mouth daily. baclofen 20 mg tablet Commonly known as:  LIORESAL Take 1 Tab by mouth three (3) times daily as needed. Blood-Glucose Meter monitoring kit Use as directed. Dispense 1 meter kit. 0 refills. Check twice a day, before each insulin dose. butalbital-acetaminophen-caffeine -40 mg per tablet Commonly known as:  FIORICET, ESGIC  
TAKE 1 TABLET BY MOUTH EVERY 6 HOURS AS NEEDED FOR HA, MUST LAST 30 DAYS CARTIA  mg ER capsule Generic drug:  dilTIAZem CD Take 1 Cap by mouth daily. carvedilol 25 mg tablet Commonly known as:  Mozelle Shaggy Take 1 Tab by mouth two (2) times daily (with meals). cilostazol 100 mg tablet Commonly known as:  PLETAL  
TAKE 1 TABLET BY MOUTH DAILY  
  
 clopidogrel 75 mg Tab Commonly known as:  PLAVIX Take  by mouth daily. COLACE 100 mg capsule Generic drug:  docusate sodium Take 100 mg by mouth daily. colchicine 0.6 mg tablet Commonly known as:  COLCRYS  
2 tab at first sign of flare then another tab 1 hour later, once flare over take 1 tab daily  
  
 gabapentin 300 mg capsule Commonly known as:  NEURONTIN  
TAKE 1 CAPSULE BY MOUTH THREE TIMES DAILY HYDROcodone-acetaminophen 5-325 mg per tablet Commonly known as:  Stormy Punt Take 1 Tab by mouth every eight (8) hours as needed for Pain. Max Daily Amount: 3 Tabs. insulin aspart U-100 100 unit/mL Inpn Commonly known as:  Jerlean Zbigniew 10 units QAC SubQ, dis 1 package  
  
 insulin glargine 300 unit/mL (1.5 mL) Inpn Commonly known as:  TOUJEO SOLOSTAR U-300 INSULIN  
20 Units by SubCUTAneous route nightly. insulin lispro 100 unit/mL kwikpen Commonly known as:  HumaLOG KwikPen Insulin 10 units subQ QAC Insulin Needles (Disposable) 31 gauge x 5/16\" Ndle For application of insulin. insulin  unit/mL injection Commonly known as:  NOVOLIN N, HUMULIN N  
6 Units by SubCUTAneous route Before breakfast and dinner. Insulin Syringes (Disposable) 1 mL Syrg 1 Syringe by Does Not Apply route two (2) times a day. Before breakfast and 12 hours later. ipratropium-albuterol  mcg/actuation inhaler Commonly known as:  Komal Bonds Take 1 Puff by inhalation every six (6) hours as needed for Wheezing (Disp 90 days). Lancets [facility] For use with glucose monitor. lisinopril 5 mg tablet Commonly known as:  PRINIVIL, ZESTRIL  
TAKE 1 TABLET BY MOUTH EVERY DAY  
  
 metFORMIN  mg tablet Commonly known as:  GLUCOPHAGE XR  
TAKE 2 TABLET BY MOUTH EVERY EVENING WITH DINNER  
  
 mirabegron ER 25 mg ER tablet Commonly known as:  MYRBETRIQ Take 1 Tab by mouth daily. MUCINEX FAST-MAX COLD-FLU-THRT 10- mg/20 mL Liqd Generic drug:  spyelgtvo-FN-dhzkvcgz-guaifen Take  by mouth nightly. nicotine 14 mg/24 hr patch Commonly known as:  NICODERM CQ  
1 Patch by TransDERmal route every twenty-four (24) hours for 30 days. ondansetron 4 mg disintegrating tablet Commonly known as:  ZOFRAN ODT Take 1 Tab by mouth every eight (8) hours as needed for Nausea. raNITIdine 150 mg tablet Commonly known as:  ZANTAC Take 150 mg by mouth two (2) times daily as needed for Indigestion (Heartburn). simvastatin 40 mg tablet Commonly known as:  ZOCOR  
TAKE 1 TABLET BY MOUTH EVERY EVENING SITagliptin 100 mg tablet Commonly known as:  Joneen Eritrean TAKE 1 TABLET BY MOUTH DAILY  
  
 triamcinolone acetonide 0.025 % topical cream  
Commonly known as:  KENALOG Apply  to affected area two (2) times a day. use thin layer  
  
 umeclidinium 62.5 mcg/actuation inhaler Commonly known as:  INCRUSE ELLIPTA Take 1 Puff by inhalation daily. XARELTO 20 mg Tab tablet Generic drug:  rivaroxaban Take  by mouth daily. * Notice: This list has 2 medication(s) that are the same as other medications prescribed for you. Read the directions carefully, and ask your doctor or other care provider to review them with you. Prescriptions Sent to Pharmacy Refills  
 lisinopril (PRINIVIL, ZESTRIL) 5 mg tablet 5 Sig: TAKE 1 TABLET BY MOUTH EVERY DAY Class: Normal  
 Pharmacy: [address] [place] [address] Ph #: [phone]  
 nicotine (NICODERM CQ) 14 mg/24 hr patch 0 Si Patch by TransDERmal route every twenty-four (24) hours for 30 days. Class: Normal  
 Pharmacy: [facility] [address] [facility] Ph #: [phone] Route: TransDERmal  
  
We Performed the Following AMB POC EKG ROUTINE W/ 12 LEADS, INTER & REP [27615 CPT(R)] Follow-up Instructions Return if symptoms worsen or fail to improve. Patient Instructions Rivaroxaban (By mouth) Rivaroxaban (gbw-u-LYY-a-ban) Treats and prevents blood clots, which lowers the risk of stroke, deep vein thrombosis (DVT), pulmonary embolism (PE), and similar conditions. This medicine is a blood thinner. Brand Name(s): Xarelto, Xarelto Starter Pack There may be other brand names for this medicine. When This Medicine Should Not Be Used: This medicine is not right for everyone. Do not use it if you had an allergic reaction to rivaroxaban, or you have severe bleeding. How to Use This Medicine:  
Tablet · Take this medicine as directed, and take it at the same time each day. · 10-milligram (mg) tablet: Take with or without food. · 15-mg or 20-mg tablet: Take with food. · If you cannot swallow the tablets, you may crush the tablet and mix it with applesauce. Eat some food after you swallow the mixture. · Tube feeding: You may crush the tablet and mix the medicine in 50 milliliters (mL) of water before giving it via the tube. This must be followed by a feeding. · This medicine should come with a Medication Guide. Ask your pharmacist for a copy if you do not have one. · Missed dose: ¨ Ask your doctor or pharmacist if you are not sure what to do if you miss a dose. ¨ Once-daily dose: If you miss a dose or forget to use your medicine, use it as soon as you can on the same day. Do not use extra medicine to make up for a missed dose. ¨ Twice-daily dose to treat a blood clot (15-mg tablet): If you miss a dose or forget to use your medicine, use it as soon as you can on the same day. You may take 2 doses at the same time to make up for the missed dose. This is only for people who take a total of 30 mg per day.  
· Store the medicine in a closed container at room temperature, away from heat, moisture, and direct light. Drugs and Foods to Avoid: Ask your doctor or pharmacist before using any other medicine, including over-the-counter medicines, vitamins, and herbal products. · Some foods and medicines can affect how rivaroxaban works. Tell your doctor if you are using any of the following: ¨ NSAID medicine (including aspirin, celecoxib, diclofenac, ibuprofen, naproxen) ¨ Ketoconazole, itraconazole, lopinavir, ritonavir, indinavir, conivaptan, carbamazepine, phenytoin, rifampin, Won's wort ¨ Another blood thinner (including clopidogrel, enoxaparin, heparin, warfarin) Warnings While Using This Medicine: · Tell your doctor if you are pregnant or breastfeeding, or if you have kidney disease, liver disease, bleeding problems, or an artificial heart valve. · This medicine may increase your risk of bleeding. Be careful to avoid injuries that could cause bleeding. Stay away from rough sports or other situations where you could be bruised, cut, or hurt. Brush and floss your teeth gently. Be careful when using sharp objects, including razors and fingernail clippers. Avoid picking your nose. If you need to blow your nose, blow it gently. · This medicine may cause nerve damage if you have a medical procedure done to your back, including anesthesia or a spinal puncture. This is more likely to happen if you have a history of back injury, back surgery, problems with your spine, or procedures or punctures to your back. Tell your doctor if you are also taking another blood thinner, because this also increases the risk. · Do not stop using this medicine suddenly without asking your doctor. You might have a higher risk of stroke for a short time after you stop using this medicine. · Tell any doctor or dentist who treats you that you are using this medicine. · Your doctor will do lab tests at regular visits to check on the effects of this medicine. Keep all appointments. · Keep all medicine out of the reach of children. Never share your medicine with anyone. Possible Side Effects While Using This Medicine:  
Call your doctor right away if you notice any of these side effects: · Allergic reaction: Itching or hives, swelling in your face or hands, swelling or tingling in your mouth or throat, chest tightness, trouble breathing · Blistering, peeling, or red skin rash · Decrease in how much or how often you urinate · Heavy menstrual bleeding, or vaginal bleeding · Red or brown urine, bloody or black, tarry stools · Unusual bleeding or bruising, including frequent nosebleeds · Vomiting blood or material that looks like coffee grounds If you notice other side effects that you think are caused by this medicine, tell your doctor. Call your doctor for medical advice about side effects. You may report side effects to FDA at 9-196-FDA-0590 © 2017 [address] Information is for End User's use only and may not be sold, redistributed or otherwise used for commercial purposes. The above information is an  only. It is not intended as medical advice for individual conditions or treatments. Talk to your doctor, nurse or pharmacist before following any medical regimen to see if it is safe and effective for you. Introducing [facility]! Dear Tramaine: Thank you for requesting a [employer] account. Our records indicate that you already have an active [employer] account. You can access your account anytime at [email or web address] Did you know that you can access your hospital and ER discharge instructions at any time in [employer]? You can also review all of your test results from your hospital stay or ER visit. Additional Information If you have questions, please visit the Frequently Asked Questions section of the [employer] website at [email or web address] Remember, MyChart is NOT to be used for urgent needs. For medical emergencies, dial 911. Now available from your iPhone and Android! Please provide this summary of care documentation to your next provider. Your primary care clinician is listed as [address] [facility]. If you have any questions after today's visit, please call [phone].

## 2018-03-20 NOTE — PROGRESS NOTES
Gisell Dunbar is a 62 y.o. female   Chief Complaint   Patient presents with   [facility] Follow Up    pt was just released from [facility] had been hospitalized for copd exacerbation and while in patient had an episode of a fib. pEr discharghe papers pt converted to sinus with a cardizem bolus. Pt states she feels awful right now. Currently on plavix daily and now xarelto daily. Pt states since starting the xarelto she has been having nose bleeds which she has never had before. Pt is on coreg 25 now and cartia 240. Pt with EF in hospital echo of 40-45%, currently on lisinopril as well. Pt had a cardiac cath in 7/17 and has been on plavix since therefore >6 months. Given her current nodse bleeds lance lc/w asa and xarelto and stop plavix until further evaluated by cardiology. Pt is also having some lightheadedness and feels off in her head per pt. Will decrease her lisinopril to 5 to see if this helps. HR on EKG was 67 and pt is in sinus rhyhthm at this time. Pt had appt with cardio on 3/30. Pt due for diabetes recheck next month but has been well controlled. Pt would like t ogo back on januvia and get off metformin and I am agereeable to this    Breathing is much better per pt. Pt still smokes and states she needs patches but can't afford advised to use cigarette money for them. .. Will send in pt smokes 1/2 ppd will send in 14mg patches to see if covered. Pt reports chest pain 8/10 and has percocet from hospital.  Pt has chest wall ttp c/w costochondritis and has an Rx for prednisone she is currently taking. she is a 62y.o. year old female who presents for evalution. Reviewed PmHx, RxHx, FmHx, SocHx, AllgHx and updated and dated in the chart.     Review of Systems - negative except as listed above in the HPI    Objective:     Vitals:    03/20/18 1610   BP: 102/80   Pulse: 85   Resp: 18   Temp: 98.5 °F (36.9 °C)   TempSrc: Oral   SpO2: 95%   Weight: 185 lb (83.9 kg)   Height: 5' 2\" (1.575 m)       Current Outpatient Prescriptions   Medication Sig    rivaroxaban (XARELTO) 20 mg tab tablet Take  by mouth daily.  carvedilol (COREG) 25 mg tablet Take 1 Tab by mouth two (2) times daily (with meals).  dilTIAZem CD (CARTIA XT) 240 mg ER capsule Take 1 Cap by mouth daily.  lisinopril (PRINIVIL, ZESTRIL) 5 mg tablet TAKE 1 TABLET BY MOUTH EVERY DAY    nicotine (NICODERM CQ) 14 mg/24 hr patch 1 Patch by TransDERmal route every twenty-four (24) hours for 30 days.  SITagliptin (JANUVIA) 100 mg tablet TAKE 1 TABLET BY MOUTH DAILY    acetaminophen-codeine (TYLENOL #3) 300-30 mg per tablet Take 1 Tab by mouth every eight (8) hours as needed for Pain. Max Daily Amount: 3 Tabs.  insulin glargine (TOUJEO SOLOSTAR) 300 unit/mL (1.5 mL) inpn 20 Units by SubCUTAneous route nightly.  simvastatin (ZOCOR) 40 mg tablet TAKE 1 TABLET BY MOUTH EVERY EVENING (Patient taking differently: Take 40 mg by mouth daily. TAKE 1 TABLET BY MOUTH EVERY EVENING)    ADVAIR DISKUS 250-50 mcg/dose diskus inhaler INHALE 1 PUFF BY MOUTH TWICE DAILY    clopidogrel (PLAVIX) 75 mg tab Take  by mouth daily.  aspirin delayed-release 81 mg tablet Take  by mouth daily.  naproxen sodium (ALEVE) 220 mg cap Take  by mouth daily. Indications: for pain    bbzxsmdnq-QT-khpqtsck-guaifen (MUCINEX FAST-MAX COLD-FLU-THRT) 10- mg/20 mL liqd Take  by mouth nightly.  umeclidinium (INCRUSE ELLIPTA) 62.5 mcg/actuation inhaler Take 1 Puff by inhalation daily.  insulin aspart (NOVOLOG) 100 unit/mL inpn 10 units QAC SubQ, dis 1 package    triamcinolone acetonide (KENALOG) 0.025 % topical cream Apply  to affected area two (2) times a day. use thin layer    mirabegron ER (MYRBETRIQ) 25 mg ER tablet Take 1 Tab by mouth daily.  ondansetron (ZOFRAN ODT) 4 mg disintegrating tablet Take 1 Tab by mouth every eight (8) hours as needed for Nausea.     butalbital-acetaminophen-caffeine (FIORICET, ESGIC) -40 mg per tablet TAKE 1 TABLET BY MOUTH EVERY 6 HOURS AS NEEDED FOR HA, MUST LAST 30 DAYS    gabapentin (NEURONTIN) 300 mg capsule TAKE 1 CAPSULE BY MOUTH THREE TIMES DAILY    allopurinol (ZYLOPRIM) 100 mg tablet TAKE 1 TABLET BY MOUTH TWICE DAILY (Patient taking differently: TAKE 1 TABLET BY MOUTH TWICE DAILY AS NEEDED)    ipratropium-albuterol (COMBIVENT RESPIMAT)  mcg/actuation inhaler Take 1 Puff by inhalation every six (6) hours as needed for Wheezing (Disp 90 days).  cilostazol (PLETAL) 100 mg tablet TAKE 1 TABLET BY MOUTH DAILY    colchicine (COLCRYS) 0.6 mg tablet 2 tab at first sign of flare then another tab 1 hour later, once flare over take 1 tab daily    baclofen (LIORESAL) 20 mg tablet Take 1 Tab by mouth three (3) times daily as needed.  insulin lispro (HUMALOG KWIKPEN) 100 unit/mL kwikpen 10 units subQ QAC    HYDROcodone-acetaminophen (NORCO) 5-325 mg per tablet Take 1 Tab by mouth every eight (8) hours as needed for Pain. Max Daily Amount: 3 Tabs.  insulin NPH (NOVOLIN N, HUMULIN N) 100 unit/mL injection 6 Units by SubCUTAneous route Before breakfast and dinner.  Insulin Needles, Disposable, 31 gauge x 2/11\" ndle For application of insulin.  Blood-Glucose Meter monitoring kit Use as directed. Dispense 1 meter kit. 0 refills. Check twice a day, before each insulin dose.  Insulin Syringes, Disposable, 1 mL syrg 1 Syringe by Does Not Apply route two (2) times a day. Before breakfast and 12 hours later.  Lancets misc For use with glucose monitor.  aspirin (ASPIRIN) 325 mg tablet Take 325 mg by mouth daily.  ranitidine (ZANTAC) 150 mg tablet Take 150 mg by mouth two (2) times daily as needed for Indigestion (Heartburn).  docusate sodium (COLACE) 100 mg capsule Take 100 mg by mouth daily. No current facility-administered medications for this visit.         Physical Examination: General appearance - alert, well appearing, and in no distress  Eyes - pupils equal and reactive, extraocular eye movements intact  Ears - bilateral TM's and external ear canals normal  Nose - mucosal congestion and dry nares R>L no dried blood currently present  Mouth - mucous membranes moist, pharynx normal without lesions  Neck - supple, no significant adenopathy  Chest - clear to auscultation, no wheezes, rales or rhonchi, symmetric air entry  Heart - normal rate, regular rhythm, normal S1, S2, no murmurs, rubs, clicks or gallops  Neurological - alert, oriented, normal speech, no focal findings or movement disorder noted  Extremities - peripheral pulses normal, no pedal edema, no clubbing or cyanosis      Assessment/ Plan:   Diagnoses and all orders for this visit:    1. Atrial fibrillation, unspecified type (HCC)  -     AMB POC EKG ROUTINE W/ 12 LEADS, INTER & REP  C/w xarelto, sinus in office. Given high [id number] of 5 will maintain xarelto for now. HR 67 on EKG c/w cardizem and coreg  2. COPD exacerbation ([address]  Quit smoking  3. Essential hypertension  -     lisinopril (PRINIVIL, ZESTRIL) 5 mg tablet; TAKE 1 TABLET BY MOUTH EVERY DAY    4. Encounter for tobacco use cessation counseling  -     nicotine (NICODERM CQ) 14 mg/24 hr patch; 1 Patch by TransDERmal route every twenty-four (24) hours for 30 days. 5. Acute costochondritis  On prednisone, use tyl 3 sparingly will not refill    6. CAD  C/w asa, stop plavix given bleeding episodes and >6 months since stenting until seen by cardiology. Maintain asa  Follow-up Disposition:  Return in about 1 month (around 4/20/2018), or if symptoms worsen or fail to improve, for diabetes. I have discussed the diagnosis with the patient and the intended plan as seen in the above orders. The patient has received an after-visit summary and questions were answered concerning future plans. Pt conveyed understanding of plan.     Medication Side Effects and Warnings were discussed with patient      Avery Brittle Lobb, DO   Over 50% of the 45 minutes face to face with Velton Maninder Ralph George consisted of counseling and/or discussing treatment plans in reference to her multiple medical issues.

## 2018-03-20 NOTE — PROGRESS NOTES
Pt here for hosp f/u states she was \"spitting up blood\"   States \"everything is messed up\" with medications

## 2018-03-20 NOTE — PATIENT INSTRUCTIONS
Rivaroxaban (By mouth)   Rivaroxaban (kzm-m-CAX-a-ban)  Treats and prevents blood clots, which lowers the risk of stroke, deep vein thrombosis (DVT), pulmonary embolism (PE), and similar conditions. This medicine is a blood thinner. Brand Name(s): Xarelto, Xarelto Starter Pack   There may be other brand names for this medicine. When This Medicine Should Not Be Used: This medicine is not right for everyone. Do not use it if you had an allergic reaction to rivaroxaban, or you have severe bleeding. How to Use This Medicine:   Tablet  · Take this medicine as directed, and take it at the same time each day. · 10-milligram (mg) tablet: Take with or without food. · 15-mg or 20-mg tablet: Take with food. · If you cannot swallow the tablets, you may crush the tablet and mix it with applesauce. Eat some food after you swallow the mixture. · Tube feeding: You may crush the tablet and mix the medicine in 50 milliliters (mL) of water before giving it via the tube. This must be followed by a feeding. · This medicine should come with a Medication Guide. Ask your pharmacist for a copy if you do not have one. · Missed dose:   ¨ Ask your doctor or pharmacist if you are not sure what to do if you miss a dose. ¨ Once-daily dose: If you miss a dose or forget to use your medicine, use it as soon as you can on the same day. Do not use extra medicine to make up for a missed dose. ¨ Twice-daily dose to treat a blood clot (15-mg tablet): If you miss a dose or forget to use your medicine, use it as soon as you can on the same day. You may take 2 doses at the same time to make up for the missed dose. This is only for people who take a total of 30 mg per day. · Store the medicine in a closed container at room temperature, away from heat, moisture, and direct light.   Drugs and Foods to Avoid:   Ask your doctor or pharmacist before using any other medicine, including over-the-counter medicines, vitamins, and herbal products. · Some foods and medicines can affect how rivaroxaban works. Tell your doctor if you are using any of the following:  ¨ NSAID medicine (including aspirin, celecoxib, diclofenac, ibuprofen, naproxen)  ¨ Ketoconazole, itraconazole, lopinavir, ritonavir, indinavir, conivaptan, carbamazepine, phenytoin, rifampin, Won's wort  ¨ Another blood thinner (including clopidogrel, enoxaparin, heparin, warfarin)  Warnings While Using This Medicine:   · Tell your doctor if you are pregnant or breastfeeding, or if you have kidney disease, liver disease, bleeding problems, or an artificial heart valve. · This medicine may increase your risk of bleeding. Be careful to avoid injuries that could cause bleeding. Stay away from rough sports or other situations where you could be bruised, cut, or hurt. Brush and floss your teeth gently. Be careful when using sharp objects, including razors and fingernail clippers. Avoid picking your nose. If you need to blow your nose, blow it gently. · This medicine may cause nerve damage if you have a medical procedure done to your back, including anesthesia or a spinal puncture. This is more likely to happen if you have a history of back injury, back surgery, problems with your spine, or procedures or punctures to your back. Tell your doctor if you are also taking another blood thinner, because this also increases the risk. · Do not stop using this medicine suddenly without asking your doctor. You might have a higher risk of stroke for a short time after you stop using this medicine. · Tell any doctor or dentist who treats you that you are using this medicine. · Your doctor will do lab tests at regular visits to check on the effects of this medicine. Keep all appointments. · Keep all medicine out of the reach of children. Never share your medicine with anyone.   Possible Side Effects While Using This Medicine:   Call your doctor right away if you notice any of these side effects:  · Allergic reaction: Itching or hives, swelling in your face or hands, swelling or tingling in your mouth or throat, chest tightness, trouble breathing  · Blistering, peeling, or red skin rash  · Decrease in how much or how often you urinate  · Heavy menstrual bleeding, or vaginal bleeding  · Red or brown urine, bloody or black, tarry stools  · Unusual bleeding or bruising, including frequent nosebleeds  · Vomiting blood or material that looks like coffee grounds  If you notice other side effects that you think are caused by this medicine, tell your doctor. Call your doctor for medical advice about side effects. You may report side effects to FDA at 9-874-FDA-1362  © 2017 [address] Information is for End User's use only and may not be sold, redistributed or otherwise used for commercial purposes. The above information is an  only. It is not intended as medical advice for individual conditions or treatments. Talk to your doctor, nurse or pharmacist before following any medical regimen to see if it is safe and effective for you.

## 2018-03-22 ENCOUNTER — PATIENT OUTREACH (OUTPATIENT)
Dept: FAMILY MEDICINE CLINIC | Age: 58
End: 2018-03-22

## 2018-03-22 NOTE — PROGRESS NOTES
[facility] Note  [phone]    Patient Name: Stephanie Cm  YOB: 1960   Date/Time:  3/22/2018 10:31 AM    Chart review reveals hospitalization from 3/11/18 to 3/16/178 at [facility] for COPD exacerbation and Afib. Mild elevation of troponin level which cardio think likely due to hypoxemia with COPD exacerbation. No intervention recommended. 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 Transition of Care appt with Dr Dion Le on 3/19/18. Per Dr Hines note: Having nose bleeds and lightheadedness. Stopping Plavix until further eval by cardio- 3/30/18. Feels breathing is much better. Smokes but states she can not afford patches. Enc to use cigarette money to purchase 14 mg patches submitted. Has costochondritis which she takes Percocet and Prednisone for. Follow up appt is scheduled for:  Future Appointments  Date Time Provider Ashley Hurt   3/30/2018 1:00 PM MD Boom Garcia 50 Directive on file in EMR? No, Will need to evaluate and see if interested in completing an ACP. Plan:  NN will follow up next week to make goals of smoking cessation, appt made with Pulmonologist? Further episodes of nose bleeds?  ACP

## 2018-03-26 ENCOUNTER — DOCUMENTATION ONLY (OUTPATIENT)
Dept: FAMILY MEDICINE CLINIC | Age: 58
End: 2018-03-26

## 2018-03-27 ENCOUNTER — PATIENT OUTREACH (OUTPATIENT)
Dept: FAMILY MEDICINE CLINIC | Age: 58
End: 2018-03-27

## 2018-03-27 RX ORDER — HYDRALAZINE HYDROCHLORIDE 50 MG/1
25 TABLET, FILM COATED ORAL 2 TIMES DAILY
COMMUNITY
End: 2018-03-30

## 2018-03-27 NOTE — Clinical Note
Patient coming in tomorrow. Concerned with med side effects. Stopped taking all of her BP medications. Was very disorganized. Wants to discuss symptoms and making additional med adjustments. I'm also going to come down and help her get a pill box organized. See my note. Thanks!

## 2018-03-27 NOTE — PROGRESS NOTES
Hospital Discharge Follow-Up      Date/Time:  3/27/2018 4:51 PM    Patient was admitted to {[employer] Single Select Template:::\"[facility]\"[place]\",\"[facility]\"[facility]\",\"[facility]\",\"[facility]\",\"[facility]\",\"[facility]\",\"[facility]\",\"[address]\"} on *** and discharged on *** for ***. The physician discharge summary {Blank Single Select Template:::\"was\",\"was not\"} available at the time of outreach. Patient contacted within *** business days of discharge. Inpatient RRAT score: ***    Top Challenges reviewed with the provider   ***           Method of communication with provider :{[employer] Multiple Select Template:::\"face to face\",\"chart routing\",\"staff message\",\"phone\",\"none\"}    Nurse Navigator (NN) contacted the {Blank Single Select Template:::\"patient\",\"family\",\"caregiver\"} by telephone to perform post hospital discharge assessment. Verified name and  with {Blank Single Select Template:::\"patient\",\"family\",\"caregiver\"} as identifiers. Provided introduction to self, and explanation of the Nurse Navigator role. Reviewed discharge instructions and red flags with  {Blank Single Select Template:::\"patient\",\"family\",\"caregiver\"} who verbalizes understanding. {Blank Single Select Template:::\"Patient\",\"Family\",\"Caregiver\"} given an opportunity to ask questions and does not have any further questions or concerns at this time. The {Blank Single Select Template:::\"patient\",\"family\",\"caregiver\"} agrees to contact the PCP office for questions related to their healthcare. NN provided contact information for future reference.     Summary of patients top problems:  1. ***  2. ***  3. ***    Home Health orders at discharge: {[employer] Multiple Select Template:20062::\"PT\",\"OT\",\"SN\",\"SLP\",\"SW\",\"H2H\",\"telehealth\",\"none\"}  [address]: ***  Date of initial visit: ***    Durable Medical Equipment ordered/company: ***  Durable Medical Equipment received: ***    Barriers to care? {[employer] Multiple Select Template:20062::\"financial\",\"depression\",\"ineffective coping\",\"lack of knowledge about disease\",\"level of motivation\",\"medication management\",\"PCP relationship\",\"stages of grief\",\"support system\",\"transportation\",\"utilization of services\"}    Advance Care Planning:   Does patient have an Advance Directive:  {[employer] Single Select Template:20061::\"reviewed and current\",\"reviewed and needs to be updated\",\"not on file\",\"education provided\",\"patient declined education\",\"referred to Certified Facilitator\"}       Medication:     New Medications at Discharge: ***  Changed Medications at Discharge: ***  Discontinued Medications at Discharge: ***    Medication reconciliation was performed with {[employer] Single Select Template:20061::\"patient\",\"family\",\"caregiver\"}, who verbalizes understanding of administration of home medications. There {[employer] Single Select Template:20061::\"were\",\"were no\"} barriers to obtaining medications identified at this time. Referral to Pharm D needed: {gen no default/yes/free text:733552::\"yes\"}     Prior to Admission medications    Medication Sig Start Date End Date Taking? Authorizing Provider   hydrALAZINE (APRESOLINE) 50 mg tablet Take 25 mg by mouth two (2) times a day. Historical Provider   rivaroxaban (XARELTO) 20 mg tab tablet Take  by mouth daily. Historical Provider   carvedilol (COREG) 25 mg tablet Take 1 Tab by mouth two (2) times daily (with meals). 3/20/18   Dick Casanova DO   dilTIAZem CD (CARTIA XT) 240 mg ER capsule Take 1 Cap by mouth daily.  3/20/18   Dick Casanova DO   lisinopril (PRINIVIL, ZESTRIL) 5 mg tablet TAKE 1 TABLET BY MOUTH EVERY DAY 3/20/18 Dick Casanova, DO   nicotine (NICODERM CQ) 14 mg/24 hr patch 1 Patch by TransDERmal route every twenty-four (24) hours for 30 days. 3/20/18 4/19/18  Dick Casanova, DO   SITagliptin (JANUVIA) 100 mg tablet TAKE 1 TABLET BY MOUTH DAILY 3/20/18   Dick Casanova, DO   acetaminophen-codeine (TYLENOL #3) 300-30 mg per tablet Take 1 Tab by mouth every eight (8) hours as needed for Pain. Max Daily Amount: 3 Tabs. 3/20/18   Dick Casanova, DO   ADVAIR DISKUS 250-50 mcg/dose diskus inhaler INHALE 1 PUFF BY MOUTH TWICE DAILY 2/26/18   Gertha Spurling Risser, MD   clopidogrel (PLAVIX) 75 mg tab Take  by mouth daily. Historical Provider   aspirin delayed-release 81 mg tablet Take  by mouth daily. Historical Provider   naproxen sodium (ALEVE) 220 mg cap Take  by mouth daily. Indications: for pain    Historical Provider   cjtzjvwpa-YZ-rencadak-guaifen ([place] [address] FAST-MAX COLD-FLU-THRT) 10- mg/20 mL liqd Take  by mouth nightly. Historical Provider   umeclidinium (INCRUSE ELLIPTA) 62.5 mcg/actuation inhaler Take 1 Puff by inhalation daily. 11/20/17   Dick Casanova, DO   insulin aspart (NOVOLOG) 100 unit/mL inpn 10 units QAC SubQ, dis 1 package 11/15/17   Parthenia Comber Jocelyne, DO   triamcinolone acetonide (KENALOG) 0.025 % topical cream Apply  to affected area two (2) times a day. use thin layer 11/14/17   Dick Casanova, DO   mirabegron ER (MYRBETRIQ) 25 mg ER tablet Take 1 Tab by mouth daily. 11/14/17   Dick Casanova, DO   ondansetron (ZOFRAN ODT) 4 mg disintegrating tablet Take 1 Tab by mouth every eight (8) hours as needed for Nausea.  11/14/17   Dick Casanova, DO   butalbital-acetaminophen-caffeine (FIORICET, ESGIC) -40 mg per tablet TAKE 1 TABLET BY MOUTH EVERY 6 HOURS AS NEEDED FOR HA, MUST LAST 30 DAYS 11/14/17   Dick Casanova DO   gabapentin (NEURONTIN) 300 mg capsule TAKE 1 CAPSULE BY MOUTH THREE TIMES DAILY 4/24/17   Eliseo Aguilar NP   allopurinol (ZYLOPRIM) 100 mg tablet TAKE 1 TABLET BY MOUTH TWICE DAILY  Patient taking differently: TAKE 1 TABLET BY MOUTH TWICE DAILY AS NEEDED 1/24/17   Dick Casanova, DO   ipratropium-albuterol (COMBIVENT RESPIMAT)  mcg/actuation inhaler Take 1 Puff by inhalation every six (6) hours as needed for Wheezing (Disp 90 days). 1/24/17   Dick Casanova, DO   cilostazol (PLETAL) 100 mg tablet TAKE 1 TABLET BY MOUTH DAILY 12/22/16   Tyrese Casanova, DO   colchicine (COLCRYS) 0.6 mg tablet 2 tab at first sign of flare then another tab 1 hour later, once flare over take 1 tab daily 4/21/16   Dick Casanova, DO   baclofen (LIORESAL) 20 mg tablet Take 1 Tab by mouth three (3) times daily as needed. 3/16/16   Dick Casanova, DO   insulin glargine (TOUJEO SOLOSTAR) 300 unit/mL (1.5 mL) inpn 20 Units by SubCUTAneous route nightly. 11/20/15   Tyrese Casanova, DO   insulin lispro (HUMALOG KWIKPEN) 100 unit/mL kwikpen 10 units subQ QAC 11/20/15   Dick Casanova, DO   HYDROcodone-acetaminophen (NORCO) 5-325 mg per tablet Take 1 Tab by mouth every eight (8) hours as needed for Pain. Max Daily Amount: 3 Tabs. 11/20/15   Dick Casanova, DO   insulin NPH (NOVOLIN N, HUMULIN N) 100 unit/mL injection 6 Units by SubCUTAneous route Before breakfast and dinner. 11/16/15   Jeremías Neal MD   Insulin Needles, Disposable, 31 gauge x 0/97\" ndle For application of insulin. 11/16/15   Jeremías Neal MD   Blood-Glucose Meter monitoring kit Use as directed. Dispense 1 meter kit. 0 refills. Check twice a day, before each insulin dose. 11/16/15   Jeremías Neal MD   Insulin Syringes, Disposable, 1 mL syrg 1 Syringe by Does Not Apply route two (2) times a day. Before breakfast and 12 hours later. 11/16/15   Jeremías Neal MD   Lancets misc For use with glucose monitor. 11/16/15   Jeremías Neal MD   aspirin (ASPIRIN) 325 mg tablet Take 325 mg by mouth daily. Historical Provider   ranitidine (ZANTAC) 150 mg tablet Take 150 mg by mouth two (2) times daily as needed for Indigestion (Heartburn).     Historical Provider   docusate sodium (COLACE) 100 mg capsule Take 100 mg by mouth daily. Historical Provider   simvastatin (ZOCOR) 40 mg tablet TAKE 1 TABLET BY MOUTH EVERY EVENING  Patient taking differently: Take 40 mg by mouth daily. TAKE 1 TABLET BY MOUTH EVERY EVENING 3/10/15   Eliseo Aguilar, NP       There are no discontinued medications. PCP/Specialist follow up: Future Appointments  Date Time Provider Ashley Hurt   3/29/2018 10:00 AM Lazarus Friedman, DO [facility]   3/30/2018 1:00 PM Kareen Mora MD [address]          Goals      Knowledge and adherence of prescribed medication            3/27/18 - Patient was very concerned about medications during call. Stated she is experiencing nausea, fatigue, dizziness, and cough. Reports that nose bleeds have resolved. States that she is on too many medications and believes her medications are causing the problems. Patient states she has stopped taking her BP medications because of the way she feels. Patient also stated she didn't take her xarelto yesterday because she thought it may also be causing her to feel bad. Discussed safety concerns and reviewed the importance of BP control and preventing blood clots secondary to a-fib. Patient was still very concerned with side effects. Patient is currently managing her own medications and seems very disorganized. Appointment booked with Dr. Caitlyn Ignacio to discuss symptoms and possible medication adjustments. Reinforced that she should not stop medications on her own without consulting her physician. Plan to come to appointment with Dr. Caitlyn Ignacio on 3/29 to help patient get set up with a pill box. Will suggest pharmacy consult. GEO       Smoking cessation. 3/29/18 - Unable to discuss during this call. Will offer information for [facility] during patient appointment.  GEO

## 2018-03-28 NOTE — PROGRESS NOTES
Goals      Knowledge and adherence of prescribed medication            3/27/18 - Patient was very concerned about medications during call. Stated she is experiencing nausea, fatigue, dizziness, and cough. Reports that nose bleeds have resolved. States that she is on too many medications and believes her medications are causing the problems. Patient states she has stopped taking her BP medications because of the way she feels. Patient also stated she didn't take her xarelto yesterday because she thought it may also be causing her to feel bad. Discussed safety concerns and reviewed the importance of BP control and preventing blood clots secondary to a-fib. Patient was still very concerned with side effects. Patient is currently managing her own medications and seems very disorganized. Appointment booked with Dr. Josh Souza to discuss symptoms and possible medication adjustments. Reinforced that she should not stop medications on her own without consulting her physician. Plan to come to appointment with Dr. Josh Souza on 3/29 to help patient get set up with a pill box. Will suggest pharmacy consult. GEO       Smoking cessation. 3/29/18 - Unable to discuss during this call. Will offer information for [facility] during patient appointment.  GEO

## 2018-03-29 ENCOUNTER — OFFICE VISIT (OUTPATIENT)
Dept: FAMILY MEDICINE CLINIC | Age: 58
End: 2018-03-29

## 2018-03-29 ENCOUNTER — PATIENT OUTREACH (OUTPATIENT)
Dept: FAMILY MEDICINE CLINIC | Age: 58
End: 2018-03-29

## 2018-03-29 VITALS
WEIGHT: 183.6 LBS | TEMPERATURE: 99.6 F | HEIGHT: 62 IN | BODY MASS INDEX: 33.79 KG/M2 | SYSTOLIC BLOOD PRESSURE: 120 MMHG | HEART RATE: 54 BPM | RESPIRATION RATE: 20 BRPM | DIASTOLIC BLOOD PRESSURE: 70 MMHG | OXYGEN SATURATION: 96 %

## 2018-03-29 DIAGNOSIS — R11.0 NAUSEA: Primary | ICD-10-CM

## 2018-03-29 DIAGNOSIS — T14.8XXA WOUND OF SKIN: ICD-10-CM

## 2018-03-29 DIAGNOSIS — J44.1 COPD EXACERBATION (HCC): ICD-10-CM

## 2018-03-29 DIAGNOSIS — I48.91 ATRIAL FIBRILLATION, UNSPECIFIED TYPE (HCC): ICD-10-CM

## 2018-03-29 RX ORDER — ONDANSETRON 8 MG/1
8 TABLET, ORALLY DISINTEGRATING ORAL
Qty: 15 TAB | Refills: 1 | Status: SHIPPED | OUTPATIENT
Start: 2018-03-29 | End: 2020-09-03 | Stop reason: SDUPTHER

## 2018-03-29 RX ORDER — HYDROXYZINE 50 MG/1
50 TABLET, FILM COATED ORAL
COMMUNITY
End: 2018-04-20

## 2018-03-29 RX ORDER — GUAIFENESIN 1200 MG/1
TABLET, EXTENDED RELEASE ORAL
Refills: 0 | COMMUNITY
Start: 2018-03-16 | End: 2018-04-20

## 2018-03-29 RX ORDER — CEPHALEXIN 250 MG/1
CAPSULE ORAL
Refills: 0 | COMMUNITY
Start: 2018-03-16 | End: 2018-04-20 | Stop reason: SDUPTHER

## 2018-03-29 RX ORDER — AZITHROMYCIN 250 MG/1
TABLET, FILM COATED ORAL
Qty: 6 TAB | Refills: 0 | Status: SHIPPED | OUTPATIENT
Start: 2018-03-29 | End: 2018-04-06

## 2018-03-29 RX ORDER — BENZONATATE 100 MG/1
CAPSULE ORAL
Qty: 30 CAP | Refills: 1 | Status: SHIPPED | OUTPATIENT
Start: 2018-03-29 | End: 2018-11-28 | Stop reason: SDUPTHER

## 2018-03-29 RX ORDER — OXYCODONE AND ACETAMINOPHEN 5; 325 MG/1; MG/1
TABLET ORAL
Refills: 0 | COMMUNITY
Start: 2018-03-16 | End: 2018-04-06

## 2018-03-29 RX ORDER — ALBUTEROL SULFATE 0.83 MG/ML
SOLUTION RESPIRATORY (INHALATION)
Refills: 0 | COMMUNITY
Start: 2018-03-19

## 2018-03-29 RX ORDER — LISINOPRIL 10 MG/1
TABLET ORAL
COMMUNITY
Start: 2018-03-24 | End: 2018-03-29

## 2018-03-29 RX ORDER — PREDNISONE 20 MG/1
TABLET ORAL
Refills: 0 | COMMUNITY
Start: 2018-03-16 | End: 2018-03-29

## 2018-03-29 RX ORDER — METFORMIN HYDROCHLORIDE 500 MG/1
TABLET ORAL
Refills: 0 | COMMUNITY
Start: 2018-03-16 | End: 2018-03-30

## 2018-03-29 RX ORDER — MUPIROCIN 20 MG/G
OINTMENT TOPICAL DAILY
Qty: 22 G | Refills: 0 | Status: SHIPPED | OUTPATIENT
Start: 2018-03-29

## 2018-03-29 RX ORDER — CARVEDILOL 6.25 MG/1
TABLET ORAL
Refills: 3 | COMMUNITY
Start: 2018-03-08 | End: 2018-03-30

## 2018-03-29 RX ORDER — CHLORTHALIDONE 25 MG/1
TABLET ORAL
Refills: 0 | COMMUNITY
Start: 2018-03-16 | End: 2018-03-30

## 2018-03-29 RX ORDER — BENZONATATE 100 MG/1
CAPSULE ORAL
Refills: 0 | COMMUNITY
Start: 2018-03-16 | End: 2018-03-29 | Stop reason: SDUPTHER

## 2018-03-29 NOTE — MR AVS SNAPSHOT
[address] [address] 
[phone] Patient: Brion Landau MRN: [id number] :1960 Visit Information Date & Time Provider Department Dept. Phone Encounter #  
 3/29/2018 10:00 AM Renae ArnoldMadhu [phone] [id number] Your Appointments 3/30/2018  1:00 PM  
ESTABLISHED PATIENT with Mike Rouse MD  
[facility] (LEVAR SCHEDULING) Appt Note: 6 wk fu appt sll  
 [address] [address] [phone]  
  
   
 [address] [address] Upcoming Health Maintenance Date Due  
 PAP AKA CERVICAL CYTOLOGY 1981 MEDICARE YEARLY EXAM 3/14/2018 HEMOGLOBIN A1C Q6M 2018 BREAST CANCER SCRN MAMMOGRAM 2018 LIPID PANEL Q1 2019 COLONOSCOPY 2024 DTaP/Tdap/Td series (2 - Td) 3/10/2025 Allergies as of 3/29/2018  Review Complete On: 3/29/2018 By: Renae Arnold DO Severity Noted Reaction Type Reactions Ciprofloxacin  2011    Nausea Only, Vertigo Current Immunizations  Reviewed on 2017 Name Date Influenza Vaccine (Quad) PF 2017, 10/19/2016, 2015 Pneumococcal Polysaccharide (PPSV-23) 2017 Tdap 3/10/2015 Not reviewed this visit You Were Diagnosed With   
  
 Codes Comments Nausea    -  Primary ICD-10-CM: R11.0 ICD-9-CM: 787.02 Atrial fibrillation, unspecified type ([facility]     ICD-10-CM: I48.91 
ICD-9-CM: 427.31   
 COPD exacerbation ([facility]     ICD-10-CM: J44.1 ICD-9-CM: 491.21 Wound of skin     ICD-10-CM: R23.8 ICD-9-CM: 364. 9 Vitals BP Pulse Temp Resp Height(growth percentile) Weight(growth percentile) 120/70 (BP 1 Location: Left arm, BP Patient Position: Sitting) (!) 54 99.6 °F (37.6 °C) (Oral) 20 5' 2\" (1.575 m) 183 lb 9.6 oz (83.3 kg) SpO2 BMI OB Status Smoking Status 96% 33.58 kg/m2 Postmenopausal Current Every Day Smoker Vitals History BMI and BSA Data Body Mass Index Body Surface Area 33.58 kg/m 2 1.91 m 2 Preferred Pharmacy Pharmacy Name Phone [facility] [phone] Your Updated Medication List  
  
   
This list is accurate as of 3/29/18 10:59 AM.  Always use your most recent med list.  
  
  
  
  
 acetaminophen-codeine 300-30 mg per tablet Commonly known as:  TYLENOL #3 Take 1 Tab by mouth every eight (8) hours as needed for Pain. Max Daily Amount: 3 Tabs. * ADVAIR DISKUS 250-50 mcg/dose diskus inhaler Generic drug:  fluticasone-salmeterol INHALE 1 PUFF BY MOUTH TWICE DAILY * ADVAIR DISKUS 100-50 mcg/dose diskus inhaler Generic drug:  fluticasone-salmeterol INHALE 1 PUFF PO BID  
  
 albuterol 2.5 mg /3 mL (0.083 %) nebulizer solution Commonly known as:  PROVENTIL VENTOLIN  
USE 1 VIAL VIA NEBULIZER Q 4 H PRF WHEEZING  
  
 ALEVE 220 mg Cap Generic drug:  naproxen sodium Take  by mouth daily. Indications: for pain  
  
 aspirin delayed-release 81 mg tablet Take  by mouth daily. azithromycin 250 mg tablet Commonly known as:  Nyla Sick Take 2 tablets today, then take 1 tablet daily  
  
 benzonatate 100 mg capsule Commonly known as:  TESSALON  
TK ONE C PO  TID PRN COUGH Blood-Glucose Meter monitoring kit Use as directed. Dispense 1 meter kit. 0 refills. Check twice a day, before each insulin dose. CARTIA  mg ER capsule Generic drug:  dilTIAZem CD Take 1 Cap by mouth daily. * carvedilol 6.25 mg tablet Commonly known as:  COREG  
TK 1 T PO BID * carvedilol 25 mg tablet Commonly known as:  Mata Todd Take 1 Tab by mouth two (2) times daily (with meals). chlorthalidone 25 mg tablet Commonly known as:  HYGROTEN  
TK 1 T PO  D  
  
 clopidogrel 75 mg Tab Commonly known as:  PLAVIX Take  by mouth daily. gabapentin 300 mg capsule Commonly known as:  NEURONTIN  
TAKE 1 CAPSULE BY MOUTH THREE TIMES DAILY  
  
 hydrALAZINE 50 mg tablet Commonly known as:  APRESOLINE Take 25 mg by mouth two (2) times a day.  
  
 hydrOXYzine HCl 50 mg tablet Commonly known as:  ATARAX Take 50 mg by mouth three (3) times daily as needed for Itching. insulin aspart U-100 100 unit/mL Inpn Commonly known as:  Aure Robins 10 units QAC SubQ, dis 1 package Insulin Needles (Disposable) 31 gauge x 5/16\" Ndle For application of insulin. Insulin Syringes (Disposable) 1 mL Syrg 1 Syringe by Does Not Apply route two (2) times a day. Before breakfast and 12 hours later. Lancets Misc For use with glucose monitor. lisinopril 5 mg tablet Commonly known as:  PRINIVIL, ZESTRIL  
TAKE 1 TABLET BY MOUTH EVERY DAY  
  
 metFORMIN 500 mg tablet Commonly known as:  GLUCOPHAGE  
TK 1 T PO  D  
  
 mirabegron ER 25 mg ER tablet Commonly known as:  MYRBETRIQ Take 1 Tab by mouth daily. MUCINEX 1,200 mg Ta12 ER tablet Generic drug:  guaiFENesin TK 1 T PO BID  
  
 MUCINEX FAST-MAX COLD-FLU-THRT 10- mg/20 mL Liqd Generic drug:  xsaqcduga-UU-wvaydbzb-guaifen Take  by mouth nightly. mupirocin 2 % ointment Commonly known as:  [employer] Apply  to affected area daily. nicotine 14 mg/24 hr patch Commonly known as:  NICODERM CQ  
1 Patch by TransDERmal route every twenty-four (24) hours for 30 days. ondansetron 8 mg disintegrating tablet Commonly known as:  ZOFRAN ODT Take 1 Tab by mouth every eight (8) hours as needed for Nausea. oxyCODONE-acetaminophen 5-325 mg per tablet Commonly known as:  PERCOCET TK 1 T PO  Q 4 H PRF SEVERE PAIN  
  
 simvastatin 40 mg tablet Commonly known as:  ZOCOR  
TAKE 1 TABLET BY MOUTH EVERY EVENING SITagliptin 100 mg tablet Commonly known as:  Сергей Stafford TAKE 1 TABLET BY MOUTH DAILY  
  
 triamcinolone acetonide 0.025 % topical cream  
Commonly known as:  KENALOG Apply  to affected area two (2) times a day. use thin layer  
  
 umeclidinium 62.5 mcg/actuation inhaler Commonly known as:  INCRUSE ELLIPTA Take 1 Puff by inhalation daily. XARELTO 20 mg Tab tablet Generic drug:  rivaroxaban Take  by mouth daily. * Notice: This list has 4 medication(s) that are the same as other medications prescribed for you. Read the directions carefully, and ask your doctor or other care provider to review them with you. Prescriptions Sent to Pharmacy Refills  
 benzonatate (TESSALON) 100 mg capsule 1 Sig: TK ONE C PO  TID PRN COUGH Class: Normal  
 Pharmacy: [facility] [address] Ph #: [phone]  
 ondansetron (ZOFRAN ODT) 8 mg disintegrating tablet 1 Sig: Take 1 Tab by mouth every eight (8) hours as needed for Nausea. Class: Normal  
 Pharmacy: [facility] [facility] Ph #: [phone] Route: Oral  
 azithromycin (ZITHROMAX) 250 mg tablet 0 Sig: Take 2 tablets today, then take 1 tablet daily Class: Normal  
 Pharmacy: [facility] [address] Ph #: [phone]  
 mupirocin (BACTROBAN) 2 % ointment 0 Sig: Apply  to affected area daily. Class: Normal  
 Pharmacy: [facility] [address] [facility] Ph #: [phone] Route: Topical  
  
Patient Instructions [facility] Now Stopping Smoking: Care Instructions Your Care Instructions Cigarette smokers crave the nicotine in cigarettes.  Giving it up is much harder than simply changing a habit. Your body has to stop craving the nicotine. It is hard to quit, but you can do it. There are many tools that people use to quit smoking. You may find that combining tools works best for you. There are several steps to quitting. First you get ready to quit. Then you get support to help you. After that, you learn new skills and behaviors to become a nonsmoker. For many people, a necessary step is getting and using medicine. Your doctor will help you set up the plan that best meets your needs. You may want to attend a smoking cessation program to help you quit smoking. When you choose a program, look for one that has proven success. Ask your doctor for ideas. You will greatly increase your chances of success if you take medicine as well as get counseling or join a cessation program. 
Some of the changes you feel when you first quit tobacco are uncomfortable. Your body will miss the nicotine at first, and you may feel short-tempered and grumpy. You may have trouble sleeping or concentrating. Medicine can help you deal with these symptoms. You may struggle with changing your smoking habits and rituals. The last step is the tricky one: Be prepared for the smoking urge to continue for a time. This is a lot to deal with, but keep at it. You will feel better. Follow-up care is a key part of your treatment and safety. Be sure to make and go to all appointments, and call your doctor if you are having problems. It's also a good idea to know your test results and keep a list of the medicines you take. How can you care for yourself at home? · Ask your family, friends, and coworkers for support. You have a better chance of quitting if you have help and support. · Join a support group, such as Nicotine Anonymous, for people who are trying to quit smoking.  
· Consider signing up for a smoking cessation program, such as the American Lung Association's Freedom from Smoking program. 
 · Set a quit date. Pick your date carefully so that it is not right in the middle of a big deadline or stressful time. Once you quit, do not even take a puff. Get rid of all ashtrays and lighters after your last cigarette. Clean your house and your clothes so that they do not smell of smoke. · Learn how to be a nonsmoker. Think about ways you can avoid those things that make you reach for a cigarette. ¨ Avoid situations that put you at greatest risk for smoking. For some people, it is hard to have a drink with friends without smoking. For others, they might skip a coffee break with coworkers who smoke. ¨ Change your daily routine. Take a different route to work or eat a meal in a different place. · Cut down on stress. Calm yourself or release tension by doing an activity you enjoy, such as reading a book, taking a hot bath, or gardening. · Talk to your doctor or pharmacist about nicotine replacement therapy, which replaces the nicotine in your body. You still get nicotine but you do not use tobacco. Nicotine replacement products help you slowly reduce the amount of nicotine you need. These products come in several forms, many of them available over-the-counter: ¨ Nicotine patches ¨ Nicotine gum and lozenges ¨ Nicotine inhaler · Ask your doctor about bupropion (Wellbutrin) or varenicline (Chantix), which are prescription medicines. They do not contain nicotine. They help you by reducing withdrawal symptoms, such as stress and anxiety. · Some people find hypnosis, acupuncture, and massage helpful for ending the smoking habit. · Eat a healthy diet and get regular exercise. Having healthy habits will help your body move past its craving for nicotine. · Be prepared to keep trying. Most people are not successful the first few times they try to quit. Do not get mad at yourself if you smoke again.  Make a list of things you learned and think about when you want to try again, such as next week, next month, or next year. Where can you learn more? Go to http://lauro-oksana.info/. Enter F254 in the search box to learn more about \"Stopping Smoking: Care Instructions. \" Current as of: March 20, 2017 Content Version: 11.4 © 3341-9160 [employer]. Care instructions adapted under license by [employer] (which disclaims liability or warranty for this information). If you have questions about a medical condition or this instruction, always ask your healthcare professional. [address] any warranty or liability for your use of this information. Introducing [facility]! Dear Alessandra Marc: Thank you for requesting a [employer] account. Our records indicate that you already have an active [employer] account. You can access your account anytime at [email or web address] Did you know that you can access your hospital and ER discharge instructions at any time in [employer])? You can also review all of your test results from your hospital stay or ER visit. Additional Information If you have questions, please visit the Frequently Asked Questions section of the [employer] website at [email or web address] Remember, [employer] is NOT to be used for urgent needs. For medical emergencies, dial 911. Now available from your iPhone and Android! Please provide this summary of care documentation to your next provider. Your primary care clinician is listed as Renae Arnold. If you have any questions after today's visit, please call [phone].

## 2018-03-29 NOTE — PATIENT INSTRUCTIONS
[phone]  [facility]  Stopping Smoking: Care Instructions  Your Care Instructions    Cigarette smokers crave the nicotine in cigarettes. Giving it up is much harder than simply changing a habit. Your body has to stop craving the nicotine. It is hard to quit, but you can do it. There are many tools that people use to quit smoking. You may find that combining tools works best for you. There are several steps to quitting. First you get ready to quit. Then you get support to help you. After that, you learn new skills and behaviors to become a nonsmoker. For many people, a necessary step is getting and using medicine. Your doctor will help you set up the plan that best meets your needs. You may want to attend a smoking cessation program to help you quit smoking. When you choose a program, look for one that has proven success. Ask your doctor for ideas. You will greatly increase your chances of success if you take medicine as well as get counseling or join a cessation program.  Some of the changes you feel when you first quit tobacco are uncomfortable. Your body will miss the nicotine at first, and you may feel short-tempered and grumpy. You may have trouble sleeping or concentrating. Medicine can help you deal with these symptoms. You may struggle with changing your smoking habits and rituals. The last step is the tricky one: Be prepared for the smoking urge to continue for a time. This is a lot to deal with, but keep at it. You will feel better. Follow-up care is a key part of your treatment and safety. Be sure to make and go to all appointments, and call your doctor if you are having problems. It's also a good idea to know your test results and keep a list of the medicines you take. How can you care for yourself at home? · Ask your family, friends, and coworkers for support. You have a better chance of quitting if you have help and support.   · Join a support group, such as Nicotine Anonymous, for people who are trying to quit smoking. · Consider signing up for a smoking cessation program, such as the American Lung Association's Freedom from Smoking program.  · Set a quit date. Pick your date carefully so that it is not right in the middle of a big deadline or stressful time. Once you quit, do not even take a puff. Get rid of all ashtrays and lighters after your last cigarette. Clean your house and your clothes so that they do not smell of smoke. · Learn how to be a nonsmoker. Think about ways you can avoid those things that make you reach for a cigarette. ¨ Avoid situations that put you at greatest risk for smoking. For some people, it is hard to have a drink with friends without smoking. For others, they might skip a coffee break with coworkers who smoke. ¨ Change your daily routine. Take a different route to work or eat a meal in a different place. · Cut down on stress. Calm yourself or release tension by doing an activity you enjoy, such as reading a book, taking a hot bath, or gardening. · Talk to your doctor or pharmacist about nicotine replacement therapy, which replaces the nicotine in your body. You still get nicotine but you do not use tobacco. Nicotine replacement products help you slowly reduce the amount of nicotine you need. These products come in several forms, many of them available over-the-counter:  ¨ Nicotine patches  ¨ Nicotine gum and lozenges  ¨ Nicotine inhaler  · Ask your doctor about bupropion (Wellbutrin) or varenicline (Chantix), which are prescription medicines. They do not contain nicotine. They help you by reducing withdrawal symptoms, such as stress and anxiety. · Some people find hypnosis, acupuncture, and massage helpful for ending the smoking habit. · Eat a healthy diet and get regular exercise. Having healthy habits will help your body move past its craving for nicotine. · Be prepared to keep trying. Most people are not successful the first few times they try to quit.  Do not get mad at yourself if you smoke again. Make a list of things you learned and think about when you want to try again, such as next week, next month, or next year. Where can you learn more? Go to http://lauro-oksana.info/. Enter S148 in the search box to learn more about \"Stopping Smoking: Care Instructions. \"  Current as of: March 20, 2017  Content Version: 11.4  © 9365-9199 [employer], [employer]. Care instructions adapted under license by [employer] (which disclaims liability or warranty for this information). If you have questions about a medical condition or this instruction, always ask your healthcare professional. [address] any warranty or liability for your use of this information.

## 2018-03-29 NOTE — PROGRESS NOTES
Goals      Completes Advanced Care Plan            3/29/18 - Unable to discuss during meeting with patient due to being focused on managing medications. Plan to discuss at next follow-up call. GEO       Knowledge and adherence of prescribed medication            3/27/18 - Patient was very concerned about medications during call. Stated she is experiencing nausea, fatigue, dizziness, and cough. Reports that nose bleeds have resolved. States that she is on too many medications and believes her medications are causing the problems. Patient states she has stopped taking her BP medications because of the way she feels. Patient also stated she didn't take her xarelto yesterday because she thought it may also be causing her to feel bad. Discussed safety concerns and reviewed the importance of BP control and preventing blood clots secondary to a-fib. Patient was still very concerned with side effects. Patient is currently managing her own medications and seems very disorganized. Appointment booked with Dr. Oswald Antoine to discuss symptoms and possible medication adjustments. Reinforced that she should not stop medications on her own without consulting her physician. Plan to come to appointment with Dr. Oswald Antoine on 3/29 to help patient get set up with a pill box. Will suggest pharmacy consult. GEO    3/29/18 - Met with patient at appointment with Dr. Oswald Antoine. Patient brought all prescription bottles to her appointment. Med Rec was completed with Dr. Oswald Antoine and adjustments were made (see Dr. Mikey Parish note.) NN typed a medication list for the patient to use to help fill a pill box so that she can get better organized with morning, afternoon, and evening meds. Patient's daughter present and states she will come to see her mother to help fill her box weekly. Patient was advised to hold Chlorthalidone, Carvedilol, Hydralizine, and Cartia per Dr. Oswald Antoine and discuss at cardiology appointment tomorrow.  Will follow-up next week to discuss medication compliance, status of pill box, and update on medications that cardiology is managing. GEO       Smoking cessation. 3/27/18 - Unable to discuss during this call. Will offer information for [facility] during patient appointment. GEO    3/29/18 - Contact Information given for [facility] during meeting with patient at appointment. Will follow-up next week to see if the patient has contacted them yet.  GEO

## 2018-03-29 NOTE — PROGRESS NOTES
Chief Complaint   Patient presents with    Medication Evaluation    Nausea     x 5 days    Abdominal Pain     x 5 days    Cough     x 5 days     1. Have you been to the ER, urgent care clinic since your last visit? Hospitalized since your last visit? No  2. Have you seen or consulted any other health care providers outside of the [address] [place] [address] since your last visit? Include any pap smears or colon screening. Was seen by a pulmonologist 3/27/18. While there patient was tested for the flu due to symptoms.

## 2018-03-29 NOTE — MR AVS SNAPSHOT
[address] [address] [phone] Patient: Sangeeta Barragan MRN: [id number] Visit Information Date & Time Provider Department Dept. Phone Encounter #  
 3/29/2018  9:11 AM Melody Silva RN [facility] [phone] [id number] Your Appointments 3/29/2018 10:00 AM  
Any with Zetta Prader Lobb, DO [facility] ([address]) Appt Note: Medication Concerns [address] [address] [address] [id number] [phone]  
  
   
 [address] [address] [address]  
  
    
 3/30/2018  1:00 PM  
ESTABLISHED PATIENT with Mathieu Elaine MD  
[facility] ([facility]) Appt Note: 6 wk fu appt sll  
 [address] [address]  
[phone]  
  
   
 [address] [address] [address] Upcoming Health Maintenance Date Due  
 PAP AKA CERVICAL CYTOLOGY 1981 MEDICARE YEARLY EXAM 3/14/2018 HEMOGLOBIN A1C Q6M 2018 BREAST CANCER SCRN MAMMOGRAM 2018 LIPID PANEL Q1 2019 COLONOSCOPY 2024 DTaP/Tdap/Td series (2 - Td) 3/10/2025 Allergies as of 3/29/2018  Review Complete On: 3/20/2018 By: Wilber Bedolla DO Severity Noted Reaction Type Reactions Ciprofloxacin  2011    Nausea Only, Vertigo Current Immunizations  Reviewed on 2017 Name Date Influenza Vaccine (Quad) PF 2017, 10/19/2016, 2015 Pneumococcal Polysaccharide (PPSV-23) 2017 Tdap 3/10/2015 Not reviewed this visit Vitals OB Status Smoking Status Postmenopausal Current Every Day Smoker Preferred Pharmacy Pharmacy Name Phone [address] [phone] Your Updated Medication List  
  
   
 This list is accurate as of 3/29/18  9:27 AM.  Always use your most recent med list.  
  
  
  
  
 acetaminophen-codeine 300-30 mg per tablet Commonly known as:  TYLENOL #3 Take 1 Tab by mouth every eight (8) hours as needed for Pain. Max Daily Amount: 3 Tabs. ADVAIR DISKUS 250-50 mcg/dose diskus inhaler Generic drug:  fluticasone-salmeterol INHALE 1 PUFF BY MOUTH TWICE DAILY ALEVE 220 mg Cap Generic drug:  naproxen sodium Take  by mouth daily. Indications: for pain  
  
 allopurinol 100 mg tablet Commonly known as:  ZYLOPRIM  
TAKE 1 TABLET BY MOUTH TWICE DAILY * aspirin 325 mg tablet Commonly known as:  ASPIRIN Take 325 mg by mouth daily. * aspirin delayed-release 81 mg tablet Take  by mouth daily. baclofen 20 mg tablet Commonly known as:  LIORESAL Take 1 Tab by mouth three (3) times daily as needed. Blood-Glucose Meter monitoring kit Use as directed. Dispense 1 meter kit. 0 refills. Check twice a day, before each insulin dose. butalbital-acetaminophen-caffeine -40 mg per tablet Commonly known as:  FIORICET, ESGIC  
TAKE 1 TABLET BY MOUTH EVERY 6 HOURS AS NEEDED FOR HA, MUST LAST 30 DAYS CARTIA  mg ER capsule Generic drug:  dilTIAZem CD Take 1 Cap by mouth daily. carvedilol 25 mg tablet Commonly known as:  Roselinda Burkitt Take 1 Tab by mouth two (2) times daily (with meals). cilostazol 100 mg tablet Commonly known as:  PLETAL  
TAKE 1 TABLET BY MOUTH DAILY  
  
 clopidogrel 75 mg Tab Commonly known as:  PLAVIX Take  by mouth daily. COLACE 100 mg capsule Generic drug:  docusate sodium Take 100 mg by mouth daily. colchicine 0.6 mg tablet Commonly known as:  COLCRYS  
2 tab at first sign of flare then another tab 1 hour later, once flare over take 1 tab daily  
  
 gabapentin 300 mg capsule Commonly known as:  NEURONTIN  
TAKE 1 CAPSULE BY MOUTH THREE TIMES DAILY hydrALAZINE 50 mg tablet Commonly known as:  APRESOLINE Take 25 mg by mouth two (2) times a day. HYDROcodone-acetaminophen 5-325 mg per tablet Commonly known as:  Alaina Ruben Take 1 Tab by mouth every eight (8) hours as needed for Pain. Max Daily Amount: 3 Tabs. insulin aspart U-100 100 unit/mL Inpn Commonly known as:  Tati Newer 10 units QAC SubQ, dis 1 package  
  
 insulin glargine 300 unit/mL (1.5 mL) Inpn Commonly known as:  TOUJEO SOLOSTAR U-300 INSULIN  
20 Units by SubCUTAneous route nightly. insulin lispro 100 unit/mL kwikpen Commonly known as:  HumaLOG KwikPen Insulin 10 units subQ QAC Insulin Needles (Disposable) 31 gauge x 5/16\" Ndle For application of insulin. insulin  unit/mL injection Commonly known as:  NOVOLIN N, HUMULIN N  
6 Units by SubCUTAneous route Before breakfast and dinner. Insulin Syringes (Disposable) 1 mL Syrg 1 Syringe by Does Not Apply route two (2) times a day. Before breakfast and 12 hours later. ipratropium-albuterol  mcg/actuation inhaler Commonly known as:  Fitz Bash Take 1 Puff by inhalation every six (6) hours as needed for Wheezing (Disp 90 days). Lancets [facility] For use with glucose monitor. lisinopril 5 mg tablet Commonly known as:  PRINIVIL, ZESTRIL  
TAKE 1 TABLET BY MOUTH EVERY DAY  
  
 mirabegron ER 25 mg ER tablet Commonly known as:  MYRBETRIQ Take 1 Tab by mouth daily. MUCINEX FAST-MAX COLD-FLU-THRT 10- mg/20 mL Liqd Generic drug:  zseipevut-LC-goopwuqc-guaifen Take  by mouth nightly. nicotine 14 mg/24 hr patch Commonly known as:  NICODERM CQ  
1 Patch by TransDERmal route every twenty-four (24) hours for 30 days. ondansetron 4 mg disintegrating tablet Commonly known as:  ZOFRAN ODT Take 1 Tab by mouth every eight (8) hours as needed for Nausea. raNITIdine 150 mg tablet Commonly known as:  ZANTAC Take 150 mg by mouth two (2) times daily as needed for Indigestion (Heartburn). simvastatin 40 mg tablet Commonly known as:  ZOCOR  
TAKE 1 TABLET BY MOUTH EVERY EVENING SITagliptin 100 mg tablet Commonly known as:  Irving Gu TAKE 1 TABLET BY MOUTH DAILY  
  
 triamcinolone acetonide 0.025 % topical cream  
Commonly known as:  KENALOG Apply  to affected area two (2) times a day. use thin layer  
  
 umeclidinium 62.5 mcg/actuation inhaler Commonly known as:  INCRUSE ELLIPTA Take 1 Puff by inhalation daily. XARELTO 20 mg Tab tablet Generic drug:  rivaroxaban Take  by mouth daily. * Notice: This list has 2 medication(s) that are the same as other medications prescribed for you. Read the directions carefully, and ask your doctor or other care provider to review them with you. Introducing [facility]! Dear [facility]: Thank you for requesting a [employer] account. Our records indicate that you already have an active [employer] account. You can access your account anytime at [email or web address] Did you know that you can access your hospital and ER discharge instructions at any time in [employer]? You can also review all of your test results from your hospital stay or ER visit. Additional Information If you have questions, please visit the Frequently Asked Questions section of the [employer] website at [email or web address] Remember, [employer] is NOT to be used for urgent needs. For medical emergencies, dial 911. Now available from your iPhone and Android! Please provide this summary of care documentation to your next provider. Your primary care clinician is listed as Marcella Jaramillo. If you have any questions after today's visit, please call [phone].

## 2018-03-29 NOTE — PROGRESS NOTES
Scooby Huerta is a 62 y.o. female   Chief Complaint   Patient presents with    Medication Evaluation    Nausea     x 5 days    Abdominal Pain     x 5 days    Cough     x 5 days    Pt here for f/u medications and pt is still taking plavix and this will again be stopped. Pt has also met without NN to go over her meds. Pt has stopped most of her meds and is taking the xarelto most days and advised she needs to take this with dinner everyday. Pt also stopped her lisinopril 5mg and will restart this for renal protection. Pt has stopped her ccb and b-blocker and given low HR will have her discuss this further with cardio tomorrow. Will maintain off ccb and b-blocker for now. Pt stopped pred and this could have been contributing to her nausea. Pt states she is nauseated all of the time. Pt is taking the zofran with a little relief. Pt also with cough and mild sob stopped the pred for stomach and is taking pepcid now. Will give zithromax for copd and give quitline to get free patches. Pt also with small wound at skin fold of pannus and not oozing. No fever or chills just putting a bandage there. Pt requesting refill of tessalon for her cough. she is a 62y.o. year old female who presents for evalution. Reviewed PmHx, RxHx, FmHx, SocHx, AllgHx and updated and dated in the chart.     Review of Systems - negative except as listed above in the HPI    Objective:     Vitals:    03/29/18 1011   BP: 120/70   Pulse: (!) 54   Resp: 20   Temp: 99.6 °F (37.6 °C)   TempSrc: Oral   SpO2: 96%   Weight: 183 lb 9.6 oz (83.3 kg)   Height: 5' 2\" (1.575 m)       Current Outpatient Prescriptions   Medication Sig    albuterol (PROVENTIL VENTOLIN) 2.5 mg /3 mL (0.083 %) nebulizer solution USE 1 VIAL VIA NEBULIZER Q 4 H PRF WHEEZING    chlorthalidone (HYGROTEN) 25 mg tablet TK 1 T PO  D    ADVAIR DISKUS 100-50 mcg/dose diskus inhaler INHALE 1 PUFF PO BID    MUCINEX 1,200 mg Ta12 ER tablet TK 1 T PO BID    oxyCODONE-acetaminophen (PERCOCET) 5-325 mg per tablet TK 1 T PO  Q 4 H PRF SEVERE PAIN    hydrOXYzine HCl (ATARAX) 50 mg tablet Take 50 mg by mouth three (3) times daily as needed for Itching.  benzonatate (TESSALON) 100 mg capsule TK ONE C PO  TID PRN COUGH    ondansetron (ZOFRAN ODT) 8 mg disintegrating tablet Take 1 Tab by mouth every eight (8) hours as needed for Nausea.  azithromycin (ZITHROMAX) 250 mg tablet Take 2 tablets today, then take 1 tablet daily    mupirocin (BACTROBAN) 2 % ointment Apply  to affected area daily.  hydrALAZINE (APRESOLINE) 50 mg tablet Take 25 mg by mouth two (2) times a day.  rivaroxaban (XARELTO) 20 mg tab tablet Take  by mouth daily.  lisinopril (PRINIVIL, ZESTRIL) 5 mg tablet TAKE 1 TABLET BY MOUTH EVERY DAY    SITagliptin (JANUVIA) 100 mg tablet TAKE 1 TABLET BY MOUTH DAILY    acetaminophen-codeine (TYLENOL #3) 300-30 mg per tablet Take 1 Tab by mouth every eight (8) hours as needed for Pain. Max Daily Amount: 3 Tabs.  aspirin delayed-release 81 mg tablet Take  by mouth daily.  naproxen sodium (ALEVE) 220 mg cap Take  by mouth daily. Indications: for pain    nrcawfcmq-ZZ-tbkdhocc-guaifen (MUCINEX FAST-MAX COLD-FLU-THRT) 10- mg/20 mL liqd Take  by mouth nightly.  umeclidinium (INCRUSE ELLIPTA) 62.5 mcg/actuation inhaler Take 1 Puff by inhalation daily.  insulin aspart (NOVOLOG) 100 unit/mL inpn 10 units QAC SubQ, dis 1 package    gabapentin (NEURONTIN) 300 mg capsule TAKE 1 CAPSULE BY MOUTH THREE TIMES DAILY    simvastatin (ZOCOR) 40 mg tablet TAKE 1 TABLET BY MOUTH EVERY EVENING (Patient taking differently: Take 40 mg by mouth daily. TAKE 1 TABLET BY MOUTH EVERY EVENING)    carvedilol (COREG) 6.25 mg tablet TK 1 T PO BID    metFORMIN (GLUCOPHAGE) 500 mg tablet TK 1 T PO  D    carvedilol (COREG) 25 mg tablet Take 1 Tab by mouth two (2) times daily (with meals).  dilTIAZem CD (CARTIA XT) 240 mg ER capsule Take 1 Cap by mouth daily.     nicotine (NICODERM CQ) 14 mg/24 hr patch 1 Patch by TransDERmal route every twenty-four (24) hours for 30 days.  ADVAIR DISKUS 250-50 mcg/dose diskus inhaler INHALE 1 PUFF BY MOUTH TWICE DAILY    clopidogrel (PLAVIX) 75 mg tab Take  by mouth daily.  triamcinolone acetonide (KENALOG) 0.025 % topical cream Apply  to affected area two (2) times a day. use thin layer    mirabegron ER (MYRBETRIQ) 25 mg ER tablet Take 1 Tab by mouth daily.  Insulin Needles, Disposable, 31 gauge x 6/84\" ndle For application of insulin.  Blood-Glucose Meter monitoring kit Use as directed. Dispense 1 meter kit. 0 refills. Check twice a day, before each insulin dose.  Insulin Syringes, Disposable, 1 mL syrg 1 Syringe by Does Not Apply route two (2) times a day. Before breakfast and 12 hours later.  Lancets misc For use with glucose monitor. No current facility-administered medications for this visit. Physical Examination: General appearance - alert, well appearing, and in no distress  Eyes - pupils equal and reactive, extraocular eye movements intact  Chest - scattered rhonchi  Heart - normal rate, regular rhythm, normal S1, S2, no murmurs, rubs, clicks or gallops  Abdomen - soft, nontender, nondistended, no masses or organomegaly  Skin - small wound of abd approx 1 cm without surrounding erythema. Assessment/ Plan:   Diagnoses and all orders for this visit:    1. Nausea  -     ondansetron (ZOFRAN ODT) 8 mg disintegrating tablet; Take 1 Tab by mouth every eight (8) hours as needed for Nausea. pepcid bid if not resolving will refer to GI  2. Atrial fibrillation, unspecified [facility] ([facility]  Hold cartia and coreg, c/w xarelto, f/u with cardio tomorrow  3. COPD exacerbation (HCC)  -     azithromycin (ZITHROMAX) 250 mg tablet; Take 2 tablets today, then take 1 tablet daily  Hold pred  4. Wound of skin  -     mupirocin (BACTROBAN) 2 % ointment; Apply  to affected area daily.   Keep dry and clean  Other orders  - benzonatate (TESSALON) 100 mg capsule; TK ONE C PO  TID PRN COUGH       Follow-up Disposition:  Return in about 1 month (around 4/29/2018), or if symptoms worsen or fail to improve, for diabetes. I have discussed the diagnosis with the patient and the intended plan as seen in the above orders. The patient has received an after-visit summary and questions were answered concerning future plans. Pt conveyed understanding of plan.     Medication Side Effects and Warnings were discussed with patient      Rajendra Wren,

## 2018-03-30 ENCOUNTER — OFFICE VISIT (OUTPATIENT)
Dept: CARDIOLOGY CLINIC | Age: 58
End: 2018-03-30

## 2018-03-30 VITALS
HEART RATE: 120 BPM | DIASTOLIC BLOOD PRESSURE: 70 MMHG | BODY MASS INDEX: 33.68 KG/M2 | SYSTOLIC BLOOD PRESSURE: 120 MMHG | WEIGHT: 183 LBS | HEIGHT: 62 IN

## 2018-03-30 DIAGNOSIS — E78.00 HYPERCHOLESTEREMIA: ICD-10-CM

## 2018-03-30 DIAGNOSIS — I10 ESSENTIAL HYPERTENSION: Primary | ICD-10-CM

## 2018-03-30 DIAGNOSIS — R06.02 SOB (SHORTNESS OF BREATH): ICD-10-CM

## 2018-03-30 RX ORDER — DILTIAZEM HYDROCHLORIDE 240 MG/1
CAPSULE, COATED, EXTENDED RELEASE ORAL DAILY
COMMUNITY
End: 2018-04-23 | Stop reason: SDUPTHER

## 2018-03-30 NOTE — PROGRESS NOTES
LAST OFFICE VISIT : 2/16/2018        ICD-10-CM ICD-9-CM   1. Essential hypertension I10 401.9   2. Hypercholesteremia E78.00 272.0   3. SOB (shortness of breath) R06.02 786.05            Lena Paniagua is a 62 y.o. female with diabetes, hypertension and dyslipidemia referred for 6 week follow up. Cardiac risk factors: smoking/ tobacco exposure, dyslipidemia, diabetes mellitus, obesity, sedentary life style, hypertension, post-menopausal  I have personally obtained the history from the patient. HISTORY OF PRESENTING ILLNESS     The pt recently go out of the hospital at [address] [place] [address] for respiratory failure and she was coughing up blood. She states that she got out of the hospital a week ago. The pt states that she was in atrial fibrillation while she was in the hospital and is now on Xarelto. She denies any bleeding on the Xarelto. She reports that she is still smoking. The pt states that she has not gone to the dentist for her teeth yet. She states that she is dealing with a lot of nausea and pain with coughing. The pt had an EKG done 10 days ago and she was not in AF at that time. She states that she does try to stay active. The patient denies chest pain/ shortness of breath, orthopnea, PND, LE edema, palpitations, syncope, presyncope or fatigue.          ACTIVE PROBLEM LIST     Patient Active Problem List    Diagnosis Date Noted    Coronary artery disease involving native coronary artery of native heart without angina pectoris 03/20/2018    Type 2 diabetes mellitus with hyperglycemia ([address] 11/20/2015    Microalbuminuria 11/20/2015    Obesity (BMI 30-39.9) 11/15/2015    PVD (peripheral vascular disease) ([facility] 11/15/2015    Hyperglycemia due to type 2 diabetes mellitus ([address] 11/15/2015    Tobacco abuse 11/15/2015    Diabetic neuropathy ([address] 03/10/2015    Migraine 03/10/2015    Hypercholesteremia 03/10/2015    Sebaceous cyst 03/08/2011    COPD (chronic obstructive pulmonary disease) ([facility] 01/17/2011    Depression 01/17/2011    DM (diabetes mellitus) ([facility] 01/03/2011    HTN (hypertension) 01/03/2011           PAST MEDICAL HISTORY     Past Medical History:   Diagnosis Date    Aneurysm ([facility]     cerebral    Anxiety     Asthma     Depression     Diabetes ([facility]     Headache(784.0)     Hypercholesterolemia     Hypertension     Other ill-defined conditions(799.89)     high cholesterol    Other ill-defined conditions(799.89)     pvd    Stroke ([facility] 2006    no residual           PAST SURGICAL HISTORY     Past Surgical History:   Procedure Laterality Date    ANEURYSM, INTRACRAN, SIMPLE SURG      HX GYN      HX OTHER SURGICAL      excision cyst/bilateral axilla          ALLERGIES     Allergies   Allergen Reactions    Ciprofloxacin Nausea Only and Vertigo          FAMILY HISTORY     Family History   Problem Relation Age of Onset    Hypertension Mother     Cancer Father     Breast Cancer Maternal Aunt     Breast Cancer Maternal Aunt     negative for cardiac disease       SOCIAL HISTORY     Social History     Social History    Marital status: SINGLE     Spouse name: N/A    Number of children: N/A    Years of education: N/A     Social History Main Topics    Smoking status: Current Every Day Smoker     Packs/day: 1.00     Years: 35.00    Smokeless tobacco: Never Used    Alcohol use Yes      Comment: occasional    Drug use: No    Sexual activity: Not Asked     Other Topics Concern    None     Social History Narrative         MEDICATIONS     Current Outpatient Prescriptions   Medication Sig    albuterol (PROVENTIL VENTOLIN) 2.5 mg /3 mL (0.083 %) nebulizer solution USE 1 VIAL VIA NEBULIZER Q 4 H PRF WHEEZING    ADVAIR DISKUS 100-50 mcg/dose diskus inhaler INHALE 1 PUFF PO BID    MUCINEX 1,200 mg Ta12 ER tablet TK 1 T PO BID    oxyCODONE-acetaminophen (PERCOCET) 5-325 mg per tablet TK 1 T PO  Q 4 H PRF SEVERE PAIN    hydrOXYzine HCl (ATARAX) 50 mg tablet Take 50 mg by mouth three (3) times daily as needed for Itching.  benzonatate (TESSALON) 100 mg capsule TK ONE C PO  TID PRN COUGH    ondansetron (ZOFRAN ODT) 8 mg disintegrating tablet Take 1 Tab by mouth every eight (8) hours as needed for Nausea.  azithromycin (ZITHROMAX) 250 mg tablet Take 2 tablets today, then take 1 tablet daily    mupirocin (BACTROBAN) 2 % ointment Apply  to affected area daily.  rivaroxaban (XARELTO) 20 mg tab tablet Take  by mouth daily.  lisinopril (PRINIVIL, ZESTRIL) 5 mg tablet TAKE 1 TABLET BY MOUTH EVERY DAY    SITagliptin (JANUVIA) 100 mg tablet TAKE 1 TABLET BY MOUTH DAILY    acetaminophen-codeine (TYLENOL #3) 300-30 mg per tablet Take 1 Tab by mouth every eight (8) hours as needed for Pain. Max Daily Amount: 3 Tabs.  ADVAIR DISKUS 250-50 mcg/dose diskus inhaler INHALE 1 PUFF BY MOUTH TWICE DAILY    aspirin delayed-release 81 mg tablet Take  by mouth daily.  naproxen sodium (ALEVE) 220 mg cap Take  by mouth daily. Indications: for pain    ewozeakbp-TS-jalqbgls-guaifen (MUCINEX FAST-MAX COLD-FLU-THRT) 10- mg/20 mL liqd Take  by mouth nightly.  umeclidinium (INCRUSE ELLIPTA) 62.5 mcg/actuation inhaler Take 1 Puff by inhalation daily.  insulin aspart (NOVOLOG) 100 unit/mL inpn 10 units QAC SubQ, dis 1 package    triamcinolone acetonide (KENALOG) 0.025 % topical cream Apply  to affected area two (2) times a day. use thin layer    gabapentin (NEURONTIN) 300 mg capsule TAKE 1 CAPSULE BY MOUTH THREE TIMES DAILY    Insulin Needles, Disposable, 31 gauge x 0/14\" ndle For application of insulin.  Blood-Glucose Meter monitoring kit Use as directed. Dispense 1 meter kit. 0 refills. Check twice a day, before each insulin dose.  Insulin Syringes, Disposable, 1 mL syrg 1 Syringe by Does Not Apply route two (2) times a day. Before breakfast and 12 hours later.  Lancets misc For use with glucose monitor.     simvastatin (ZOCOR) 40 mg tablet TAKE 1 TABLET BY MOUTH EVERY EVENING (Patient taking differently: Take 40 mg by mouth daily. TAKE 1 TABLET BY MOUTH EVERY EVENING)     No current facility-administered medications for this visit. I have reviewed the nurses notes, vitals, problem list, allergy list, medical history, family, social history and medications. REVIEW OF SYMPTOMS      General: Pt denies excessive weight gain or loss. Pt is able to conduct ADL's  HEENT: Denies blurred vision, headaches, hearing loss, epistaxis and difficulty swallowing. Respiratory: Denies cough, congestion, shortness of breath, FLORES, wheezing or stridor. Cardiovascular: Denies precordial pain, palpitations, edema or PND  Gastrointestinal: Denies poor appetite, indigestion, abdominal pain or blood in stool  Genitourinary: Denies hematuria, dysuria, increased urinary frequency  Musculoskeletal: Denies joint pain or swelling from muscles or joints  Neurologic: Denies tremor, paresthesias, headache, or sensory motor disturbance  Psychiatric: Denies confusion, insomnia, depression  Integumentray: Denies rash, itching or ulcers. Hematologic: Denies easy bruising, bleeding     PHYSICAL EXAMINATION      Vitals:    03/30/18 1259   BP: 120/70   Pulse: (!) 120   Weight: 183 lb (83 kg)   Height: 5' 2\" (1.575 m)     General: Well developed, in no acute distress. HEENT: Poor dental hygiene No jaundice, oral mucosa moist, no oral ulcers  Neck: Supple, no stiffness, no lymphadenopathy, supple  Heart:Irregularly irregular and rapid  Respiratory: Clear bilaterally x 4, no wheezing or rales  Extremities:  No edema, normal cap refill, no cyanosis. Musculoskeletal: No clubbing, no deformities  Neuro: A&Ox3, speech clear, gait stable, cooperative, no focal neurologic deficits  Skin: Skin color is normal. No rashes or lesions. Non diaphoretic, moist.          EKG: atrial fibrillation with RVR     DIAGNOSTIC DATA     1. Echo  8/27/15- EF 65%  (2/22/18) LVEF 45% grade 1 DD. LA mild dilated. MV mild regurg.  AV leaflets sclerosed with mild regurg. 2. Cath  8/27/15-  EF 60 %., LAD luminal irreg. 20%, LCX minor luminal irreg. RCA ok, PDA 30 and 40%    Left leg disease seen on the abdominal aortic injeciton up to 80%. Right subclavian: There was a 95 % stenosis. 3. Lipids  4/20/16- , HDL 26, LDL 86, Tg 167  (2/19/18) , HDL 32, LDL 88,     4. Carotid Doppler  8/27/15- 10-49% L/R, subclavian steal syndrome indicated due to reversal of vertebral artery flow. LABORATORY DATA            Lab Results   Component Value Date/Time    WBC 10.4 11/14/2017 03:44 PM    HGB 14.9 11/14/2017 03:44 PM    HCT 44.0 11/14/2017 03:44 PM    PLATELET 899 07/42/3445 03:44 PM    MCV 92 11/14/2017 03:44 PM      Lab Results   Component Value Date/Time    Sodium 144 11/14/2017 03:44 PM    Potassium 3.9 11/14/2017 03:44 PM    Chloride 103 11/14/2017 03:44 PM    CO2 26 11/14/2017 03:44 PM    Anion gap 7 11/16/2015 03:30 AM    Glucose 231 (H) 11/14/2017 03:44 PM    BUN 5 (L) 11/14/2017 03:44 PM    Creatinine 0.75 11/14/2017 03:44 PM    BUN/Creatinine ratio 7 (L) 11/14/2017 03:44 PM    GFR est  11/14/2017 03:44 PM    GFR est non-AA 89 11/14/2017 03:44 PM    Calcium 9.1 11/14/2017 03:44 PM    Bilirubin, total 0.4 02/19/2018 11:51 AM    AST (SGOT) 13 02/19/2018 11:51 AM    Alk. phosphatase 80 02/19/2018 11:51 AM    Protein, total 6.9 02/19/2018 11:51 AM    Albumin 3.9 02/19/2018 11:51 AM    Globulin 3.7 11/15/2015 05:50 PM    A-G Ratio 1.3 11/14/2017 03:44 PM    ALT (SGPT) 7 02/19/2018 11:51 AM           ASSESSMENT/RECOMMENDATIONS:.      1. Atrial fibrillation/ SVT  - today she is found to be in AF at a RVR apparently she had an EKG on 3/20 at Rian Reynolds DO office and this showed a NSR. I believe she has been going in and out of AF and had some irregularity in her heart rhythm during her last hospital visit at [facility]. I think this is going to be an ongoing problem with her underlying pulmonary disease.  I favor that she see Dr. Woody Quach for any further recommendations regarding antiarrhythmics versus ablations. Note that she has not been taking her diltiazem so I am restarting this today, we gave her a tablet in the office. She will return on Monday for an EKG hopefully in Dr. Soha Mora office. 2. Subclavian stenting ballooned open on 7/31/17  - No arms issues, she also has stenting of her ilea she states. - she has been taken off plavix and I believe was on it for her subclavian stents that were placed about a year ago. - I instructed her to follow up with Dr. Kristin Anderson who she states did the stenting. 3. CAD  - she is asymptomatic at this time. I do not believe any further cardiac testing is needed. Continue risk factor modification.   - I did talk to her about dental hygiene and the risk for endocarditis and heart disease developing. I instructed her to see a dentist to possibly get some of her teeth pulled. 4. Shortness of breath  - All related mostly to her pulmonary issues although her EF was 45% on last echo. 5. Claudication  - I instructed her to follow up with Dr. Kristin Anderson. 6. Diabetes   - Last HGB A1C was 6.3%, goal should be 6% or below. 7. Tobacco dependency  - Again talked to her about the importance of refraining from smoking, I was very candid with her today and hopefully it will sink in.   8. Hypertension  - Blood pressure is under good control. 9. Dyslipidemia   - Lipids are close to goal as I favor an LDL of 70 secondary to her diabetes. 10. Return in 3 weeks or PRN. No orders of the defined types were placed in this encounter. Follow-up Disposition:  Return in about 6 months (around 9/30/2018). I have discussed the diagnosis with  Kenny Lawson and the intended plan as seen in the above orders. Questions were answered concerning future plans. I have discussed medication side effects and warnings with the patient as well.     Thank you,  Wolf Hansen DO for involving me in the care of  Soraya Durant. Please do not hesitate to contact me for further questions/concerns. Written by David Collier, as dictated by Malathi Evans MD.     Karsten Neves MD, [facility]    Patient Care Team:  Aundrea Adamson DO as PCP - General (Family Practice)  Jackie Melchor MD (Cardiology)  Xin Gomez, RN as Ambulatory Care Navigator (Family Practice)    [place] [id number] [address]      [phone] / [phone] Fax

## 2018-03-30 NOTE — MR AVS SNAPSHOT
[id number] [place] [address] [address] [address] 
[phone] Patient: Michelle Cavazos MRN: [id number] :1960 Visit Information Date & Time Provider Department Dept. Phone Encounter #  
 3/30/2018  1:00 PM Tahira Gutierrez MD [facility] [place] [phone] [id number] Follow-up Instructions Return in about 6 months (around 2018). Your Appointments 2018  1:20 PM  
ESTABLISHED PATIENT with Tahira Gutierrez MD  
[facility] ([facility]) Appt Note: 3 wk fu appt sll  
 [address] [address] [address]  
[phone]  
  
   
 [address] [facility] [address]  
  
    
 2018  2:00 PM  
New Patient with Melyssa Valenzuela MD  
[facility] Appt Note: ref by Dr Sakshi Siegel Dx Afib sll [address] [facility] 600 1007 [facility]  
54 [facility] 13 Upcoming Health Maintenance Date Due  
 PAP AKA CERVICAL CYTOLOGY 1981 MEDICARE YEARLY EXAM 3/14/2018 HEMOGLOBIN A1C Q6M 2018 BREAST CANCER SCRN MAMMOGRAM 2018 LIPID PANEL Q1 2019 COLONOSCOPY 2024 DTaP/Tdap/Td series (2 - Td) 3/10/2025 Allergies as of 3/30/2018  Review Complete On: 3/30/2018 By: Tahira Gutierrez MD  
  
 Severity Noted Reaction Type Reactions Ciprofloxacin  2011    Nausea Only, Vertigo Current Immunizations  Reviewed on 2017 Name Date Influenza Vaccine (Quad) PF 2017, 10/19/2016, 2015 Pneumococcal Polysaccharide (PPSV-23) 2017 Tdap 3/10/2015 Not reviewed this visit You Were Diagnosed With   
  
 Codes Comments Essential hypertension    -  Primary ICD-10-CM: I10 
ICD-9-CM: 401.9 Hypercholesteremia     ICD-10-CM: E78.00 ICD-9-CM: 272.0 SOB (shortness of breath)     ICD-10-CM: R06.02 
ICD-9-CM: 786.05 Vitals BP Pulse Height(growth percentile) Weight(growth percentile) BMI OB Status 120/70 (!) 120 5' 2\" (1.575 m) 183 lb (83 kg) 33.47 kg/m2 Postmenopausal  
 Smoking Status Current Every Day Smoker Vitals History BMI and BSA Data Body Mass Index Body Surface Area  
 33.47 kg/m 2 1.91 m 2 Preferred Pharmacy Pharmacy Name Phone [address] [phone] Your Updated Medication List  
  
   
This list is accurate as of 3/30/18  1:30 PM.  Always use your most recent med list.  
  
  
  
  
 acetaminophen-codeine 300-30 mg per tablet Commonly known as:  TYLENOL #3 Take 1 Tab by mouth every eight (8) hours as needed for Pain. Max Daily Amount: 3 Tabs. * ADVAIR DISKUS 250-50 mcg/dose diskus inhaler Generic drug:  fluticasone-salmeterol INHALE 1 PUFF BY MOUTH TWICE DAILY * ADVAIR DISKUS 100-50 mcg/dose diskus inhaler Generic drug:  fluticasone-salmeterol INHALE 1 PUFF PO BID  
  
 albuterol 2.5 mg /3 mL (0.083 %) nebulizer solution Commonly known as:  PROVENTIL VENTOLIN  
USE 1 VIAL VIA NEBULIZER Q 4 H PRF WHEEZING  
  
 ALEVE 220 mg Cap Generic drug:  naproxen sodium Take  by mouth daily. Indications: for pain  
  
 aspirin delayed-release 81 mg tablet Take  by mouth daily. azithromycin 250 mg tablet Commonly known as:  Poncho Prophet Take 2 tablets today, then take 1 tablet daily  
  
 benzonatate 100 mg capsule Commonly known as:  TESSALON  
TK ONE C PO  TID PRN COUGH Blood-Glucose Meter monitoring kit Use as directed. Dispense 1 meter kit. 0 refills. Check twice a day, before each insulin dose. CARDIZEM  mg ER capsule Generic drug:  dilTIAZem CD Take  by mouth daily. gabapentin 300 mg capsule Commonly known as:  NEURONTIN  
TAKE 1 CAPSULE BY MOUTH THREE TIMES DAILY  
  
 hydrOXYzine HCl 50 mg tablet Commonly known as:  ATARAX Take 50 mg by mouth three (3) times daily as needed for Itching. insulin aspart U-100 100 unit/mL Inpn Commonly known as:  Jerry Danas 10 units QAC SubQ, dis 1 package Insulin Needles (Disposable) 31 gauge x 5/16\" Ndle For application of insulin. Insulin Syringes (Disposable) 1 mL Syrg 1 Syringe by Does Not Apply route two (2) times a day. Before breakfast and 12 hours later. Lancets Misc For use with glucose monitor. lisinopril 5 mg tablet Commonly known as:  PRINIVIL, ZESTRIL  
TAKE 1 TABLET BY MOUTH EVERY DAY  
  
 MUCINEX 1,200 mg Ta12 ER tablet Generic drug:  guaiFENesin TK 1 T PO BID  
  
 MUCINEX FAST-MAX COLD-FLU-THRT 10- mg/20 mL Liqd Generic drug:  vsclidscx-GK-qwukjtsa-guaifen Take  by mouth nightly. mupirocin 2 % ointment Commonly known as:  [employer] Apply  to affected area daily. ondansetron 8 mg disintegrating tablet Commonly known as:  ZOFRAN ODT Take 1 Tab by mouth every eight (8) hours as needed for Nausea. oxyCODONE-acetaminophen 5-325 mg per tablet Commonly known as:  PERCOCET TK 1 T PO  Q 4 H PRF SEVERE PAIN  
  
 simvastatin 40 mg tablet Commonly known as:  ZOCOR  
TAKE 1 TABLET BY MOUTH EVERY EVENING SITagliptin 100 mg tablet Commonly known as:  Michael Halsted TAKE 1 TABLET BY MOUTH DAILY  
  
 triamcinolone acetonide 0.025 % topical cream  
Commonly known as:  KENALOG Apply  to affected area two (2) times a day. use thin layer  
  
 umeclidinium 62.5 mcg/actuation inhaler Commonly known as:  INCRUSE ELLIPTA Take 1 Puff by inhalation daily. XARELTO 20 mg Tab tablet Generic drug:  rivaroxaban Take  by mouth daily. * Notice: This list has 2 medication(s) that are the same as other medications prescribed for you.  Read the directions carefully, and ask your doctor or other care provider to review them with you. Follow-up Instructions Return in about 6 months (around 9/30/2018). Introducing [facility]! Dear Alessandro Rossi: Thank you for requesting a [employer] account. Our records indicate that you already have an active [employer] account. You can access your account anytime at [email or web address] Did you know that you can access your hospital and ER discharge instructions at any time in [employer]Me? You can also review all of your test results from your hospital stay or ER visit. Additional Information If you have questions, please visit the Frequently Asked Questions section of the [employer] website at [email or web address] Remember, [employer] is NOT to be used for urgent needs. For medical emergencies, dial 911. Now available from your iPhone and Android! Please provide this summary of care documentation to your next provider. Your primary care clinician is listed as Tupelo Lack. If you have any questions after today's visit, please call [phone].

## 2018-03-30 NOTE — PROGRESS NOTES
Visit Vitals    /70    Pulse (!) 120    Ht 5' 2\" (1.575 m)    Wt 183 lb (83 kg)    BMI 33.47 kg/m2

## 2018-04-02 ENCOUNTER — OFFICE VISIT (OUTPATIENT)
Dept: FAMILY MEDICINE CLINIC | Age: 58
End: 2018-04-02

## 2018-04-02 VITALS
DIASTOLIC BLOOD PRESSURE: 81 MMHG | HEIGHT: 62 IN | WEIGHT: 179 LBS | OXYGEN SATURATION: 91 % | TEMPERATURE: 97.9 F | BODY MASS INDEX: 32.94 KG/M2 | RESPIRATION RATE: 20 BRPM | HEART RATE: 84 BPM | SYSTOLIC BLOOD PRESSURE: 147 MMHG

## 2018-04-02 DIAGNOSIS — I48.91 ATRIAL FIBRILLATION, UNSPECIFIED TYPE (HCC): Primary | ICD-10-CM

## 2018-04-02 NOTE — MR AVS SNAPSHOT
[address] [address] [address] [address] [phone] Patient: Andi Puckett MRN: [id number] :[id number] Visit Information Date & Time Provider Department Dept. Phone Encounter #  
 2018  2:20 PM Paulino Schwartz MD 59[address] [facility] [phone] [id number] Follow-up Instructions Return if symptoms worsen or fail to improve. Your Appointments 2018  1:20 PM  
ESTABLISHED PATIENT with Roman Hollins MD  
[facility] (LEVAR SCHEDULING) Appt Note: 3 wk fu appt sll  
 [address] [address] [address] [id number] [phone]  
  
   
 [address] [address]  
  
    
 2018  2:00 PM  
New Patient with Nae Carpio MD  
[facility] ([address]) Appt Note: ref by Dr Akash Roldan Dx Afib sll [address] [facility] 600 1900 [id number] [place] [address] Upcoming Health Maintenance Date Due  
 PAP AKA CERVICAL CYTOLOGY 1981 MEDICARE YEARLY EXAM 3/14/2018 HEMOGLOBIN A1C Q6M 2018 BREAST CANCER SCRN MAMMOGRAM 2018 LIPID PANEL Q1 2019 COLONOSCOPY 2024 DTaP/Tdap/Td series (2 - Td) 3/10/2025 Allergies as of 2018  Review Complete On: 2018 By: Paulino Schwartz MD  
  
 Severity Noted Reaction Type Reactions Ciprofloxacin  2011    Nausea Only, Vertigo Current Immunizations  Reviewed on 2017 Name Date Influenza Vaccine (Quad) PF 2017, 10/19/2016, 2015 Pneumococcal Polysaccharide (PPSV-23) 2017 Tdap 3/10/2015 Not reviewed this visit You Were Diagnosed With   
  
 Codes Comments Atrial fibrillation, unspecified type ([facility]    -  Primary ICD-10-CM: I48.91 
ICD-9-CM: 427.31 Vitals BP Pulse Temp Resp Height(growth percentile) Weight(growth percentile) 147/81 84 97.9 °F (36.6 °C) 20 5' 2\" (1.575 m) 179 lb (81.2 kg) SpO2 BMI OB Status Smoking Status 91% 32.74 kg/m2 Postmenopausal Current Every Day Smoker Vitals History BMI and BSA Data Body Mass Index Body Surface Area 32.74 kg/m 2 1.88 m 2 Preferred Pharmacy Pharmacy Name Phone [facility] [phone] Your Updated Medication List  
  
   
This list is accurate as of 4/2/18  3:32 PM.  Always use your most recent med list.  
  
  
  
  
 acetaminophen-codeine 300-30 mg per tablet Commonly known as:  TYLENOL #3 Take 1 Tab by mouth every eight (8) hours as needed for Pain. Max Daily Amount: 3 Tabs. * ADVAIR DISKUS 250-50 mcg/dose diskus inhaler Generic drug:  fluticasone-salmeterol INHALE 1 PUFF BY MOUTH TWICE DAILY * ADVAIR DISKUS 100-50 mcg/dose diskus inhaler Generic drug:  fluticasone-salmeterol INHALE 1 PUFF PO BID  
  
 albuterol 2.5 mg /3 mL (0.083 %) nebulizer solution Commonly known as:  PROVENTIL VENTOLIN  
USE 1 VIAL VIA NEBULIZER Q 4 H PRF WHEEZING  
  
 ALEVE 220 mg Cap Generic drug:  naproxen sodium Take  by mouth daily. Indications: for pain  
  
 aspirin delayed-release 81 mg tablet Take  by mouth daily. azithromycin 250 mg tablet Commonly known as:  Arjun Hire Take 2 tablets today, then take 1 tablet daily  
  
 benzonatate 100 mg capsule Commonly known as:  TESSALON  
TK ONE C PO  TID PRN COUGH Blood-Glucose Meter monitoring kit Use as directed. Dispense 1 meter kit. 0 refills. Check twice a day, before each insulin dose. CARDIZEM  mg ER capsule Generic drug:  dilTIAZem CD Take  by mouth daily. gabapentin 300 mg capsule Commonly known as:  NEURONTIN  
TAKE 1 CAPSULE BY MOUTH THREE TIMES DAILY  
  
 hydrOXYzine HCl 50 mg tablet Commonly known as:  ATARAX Take 50 mg by mouth three (3) times daily as needed for Itching. insulin aspart U-100 100 unit/mL Inpn Commonly known as:  Fredderick Peer 10 units QAC SubQ, dis 1 package Insulin Needles (Disposable) 31 gauge x 5/16\" Ndle For application of insulin. Insulin Syringes (Disposable) 1 mL Syrg 1 Syringe by Does Not Apply route two (2) times a day. Before breakfast and 12 hours later. Lancets Misc For use with glucose monitor. lisinopril 5 mg tablet Commonly known as:  PRINIVIL, ZESTRIL  
TAKE 1 TABLET BY MOUTH EVERY DAY  
  
 MUCINEX 1,200 mg Ta12 ER tablet Generic drug:  guaiFENesin TK 1 T PO BID  
  
 MUCINEX FAST-MAX COLD-FLU-THRT 10- mg/20 mL Liqd Generic drug:  ebzleaxgw-DF-rjpowcva-guaifen Take  by mouth nightly. mupirocin 2 % ointment Commonly known as:  [employer] Apply  to affected area daily. ondansetron 8 mg disintegrating tablet Commonly known as:  ZOFRAN ODT Take 1 Tab by mouth every eight (8) hours as needed for Nausea. oxyCODONE-acetaminophen 5-325 mg per tablet Commonly known as:  PERCOCET TK 1 T PO  Q 4 H PRF SEVERE PAIN  
  
 simvastatin 40 mg tablet Commonly known as:  ZOCOR  
TAKE 1 TABLET BY MOUTH EVERY EVENING SITagliptin 100 mg tablet Commonly known as:  [place] TAKE 1 TABLET BY MOUTH DAILY  
  
 triamcinolone acetonide 0.025 % topical cream  
Commonly known as:  KENALOG Apply  to affected area two (2) times a day. use thin layer  
  
 umeclidinium 62.5 mcg/actuation inhaler Commonly known as:  INCRUSE ELLIPTA Take 1 Puff by inhalation daily. XARELTO 20 mg Tab tablet Generic drug:  rivaroxaban Take  by mouth daily. * Notice: This list has 2 medication(s) that are the same as other medications prescribed for you. Read the directions carefully, and ask your doctor or other care provider to review them with you. We Performed the Following AMB POC EKG ROUTINE W/ 12 LEADS, INTER & REP [37941 CPT(R)] Follow-up Instructions Return if symptoms worsen or fail to improve. Introducing [facility] SERVICES! Dear Lewis Mendez: Thank you for requesting a Hoodin account. Our records indicate that you already have an active Hoodin account. You can access your account anytime at [email or web address] Did you know that you can access your hospital and ER discharge instructions at any time in Hoodin? You can also review all of your test results from your hospital stay or ER visit. Additional Information If you have questions, please visit the Frequently Asked Questions section of the Hoodin website at [email or web address] Remember, Hoodin is NOT to be used for urgent needs. For medical emergencies, dial 911. Now available from your iPhone and Android! Please provide this summary of care documentation to your next provider. Your primary care clinician is listed as Danish Brooks. If you have any questions after today's visit, please call [phone].

## 2018-04-02 NOTE — PROGRESS NOTES
Patient here for afib, ekg as requested by . Please see notes. 1. Have you been to the ER, urgent care clinic since your last visit? Hospitalized since your last visit? No    2. Have you seen or consulted any other health care providers outside of the [address] [place] [address] since your last visit? Include any pap smears or colon screening. No       Chief Complaint   Patient presents with    Abnormal EKG     afib, requested by cardio     She is a 62 y.o. female who presents for evalution. Reviewed PmHx, RxHx, FmHx, SocHx, AllgHx and updated and dated in the chart. Patient Active Problem List    Diagnosis    Coronary artery disease involving native coronary artery of native heart without angina pectoris    Type 2 diabetes mellitus with hyperglycemia (HCC)    Microalbuminuria    Obesity (BMI 30-39. 9)    PVD (peripheral vascular disease) ([facility]    Hyperglycemia due to type 2 diabetes mellitus ([facility])    Tobacco abuse    Diabetic neuropathy (HCC)    Migraine    Hypercholesteremia    Sebaceous cyst    COPD (chronic obstructive pulmonary disease) ([facility])    Depression    DM (diabetes mellitus) ([facility]    HTN (hypertension)       Review of Systems - negative except as listed above in the HPI    Objective:     Vitals:    04/02/18 1502   BP: 147/81   Pulse: 84   Resp: 20   Temp: 97.9 °F (36.6 °C)   SpO2: 91%   Weight: 179 lb (81.2 kg)   Height: 5' 2\" (1.575 m)     Physical Examination: General appearance - alert, well appearing, and in no distress  Chest - clear to auscultation, no wheezes, rales or rhonchi, symmetric air entry  Heart - normal rate, regular rhythm, normal S1, S2, no murmurs, rubs, clicks or gallops    Assessment/ Plan:   Diagnoses and all orders for this visit:    1. Atrial fibrillation, unspecified type (HCC)  -     AMB POC EKG ROUTINE W/ 12 LEADS, INTER & REP  -send results to Dr Teetee Santos       Follow-up Disposition:  Return if symptoms worsen or fail to improve.     I have discussed the diagnosis with the patient and the intended plan as seen in the above orders. The patient understands and agrees with the plan. The patient has received an after-visit summary and questions were answered concerning future plans. Medication Side Effects and Warnings were discussed with patient  Patient Labs were reviewed and or requested:  Patient Past Records were reviewed and or requested    Krish James M.D. There are no Patient Instructions on file for this visit.

## 2018-04-05 ENCOUNTER — PATIENT OUTREACH (OUTPATIENT)
Dept: FAMILY MEDICINE CLINIC | Age: 58
End: 2018-04-05

## 2018-04-05 NOTE — LETTER
Ms. Howard Miriam, Thanks so much for speaking with me on the phone today. Im glad things are going well for you. Per our phone conversation, I'm including information on Advanced Care Planning. Please review the information and call our office to book an appointment with our Vidmaker, Sly mckeon, RN. She will help you complete the documents to be scanned in to your medical record. Let me know if you have any questions. Talk to you again in a few weeks. Sincerely, BONNIE Pelletier, RN 
Nurse Navigator

## 2018-04-05 NOTE — PROGRESS NOTES
4/5/18 - Follow-up call placed. Message left requesting call back. GEO    Goals      Completes Advanced Care Plan            3/29/18 - Unable to discuss during meeting with patient due to being focused on managing medications. Plan to discuss at next follow-up call. GEO    4/12/18 - 4/12/18 - Discussed Advanced Care Planning. Patient is interested in receiving information. Mailing information on Honoring Choices with contact information for [employer] facilitator Sly mckeon RN to book an appointment to complete paperwork. GEO       Knowledge and adherence of prescribed medication            3/27/18 - Patient was very concerned about medications during call. Stated she is experiencing nausea, fatigue, dizziness, and cough. Reports that nose bleeds have resolved. States that she is on too many medications and believes her medications are causing the problems. Patient states she has stopped taking her BP medications because of the way she feels. Patient also stated she didn't take her xarelto yesterday because she thought it may also be causing her to feel bad. Discussed safety concerns and reviewed the importance of BP control and preventing blood clots secondary to a-fib. Patient was still very concerned with side effects. Patient is currently managing her own medications and seems very disorganized. Appointment booked with Dr. Tolu Coffey to discuss symptoms and possible medication adjustments. Reinforced that she should not stop medications on her own without consulting her physician. Plan to come to appointment with Dr. Tolu Coffey on 3/29 to help patient get set up with a pill box. Will suggest pharmacy consult. GEO    3/29/18 - Met with patient at appointment with Dr. Tolu Coffey. Patient brought all prescription bottles to her appointment.  Med Rec was completed with Dr. Tolu Coffey and adjustments were made (see Dr. Socrates Arroyo note.) NN typed a medication list for the patient to use to help fill a pill box so that she can get better organized with morning, afternoon, and evening meds. Patient's daughter present and states she will come to see her mother to help fill her box weekly. Patient was advised to hold Chlorthalidone, Carvedilol, Hydralizine, and Cartia per Dr. Steffan Dance and discuss at cardiology appointment tomorrow. Will follow-up next week to discuss medication compliance, status of pill box, and update on medications that cardiology is managing. GEO    4/12/18 - Patient reports she is doing much better since the appointment with Dr. Steffan Dance. Reviewed Med Rec again with the patient. Patient states her daughter was helping her with her pill box, but she is now confident and doing it on her own. Reports that she is still having some issues with upset stomach, but is using OTC Maalox and zofran PRN and states that has been helping. States she hasn't needed any today. Reports she did visit the ED around 5 days ago for constipation which has since resolved. Patient attended follow-up appointment with cardiology who advised the patient to restart cardizem. Patient had EKG done at [facility] on 4/2/18 which showed NSR. Patient had no medication questions or concerns at this time. Also reminded patient that Dr. Steffan Dance wanted to see her at the end of the month for diabetes check. Patient states she will call to book an appointment. GEO       Smoking cessation. 3/27/18 - Unable to discuss during this call. Will offer information for [facility] during patient appointment. GEO    3/29/18 - Contact Information given for [facility] during meeting with patient at appointment. Will follow-up next week to see if the patient has contacted them yet. GEO    4/12/18 - Patient states she has not contacted [facility] yet. Patient states she is unsure if she is ready to quit at this point. Patient is currently smoking 1 pack/day.  Encouraged patient to contact [facility] to inquire about resources that are available to her and additional education to help her make an informed decision about quitting and possibly develop a plan. Encouraged patient to try cutting back slowly. Patient states she will try to cut back 1 cigarette per day to hopefully cut back to 1/2 pack/day over the coming weeks.  GEO

## 2018-04-06 ENCOUNTER — HOSPITAL ENCOUNTER (EMERGENCY)
Age: 58
Discharge: HOME | End: 2018-04-06
Attending: EMERGENCY MEDICINE | Admitting: EMERGENCY MEDICINE
Payer: MEDICARE

## 2018-04-06 ENCOUNTER — APPOINTMENT (OUTPATIENT)
Dept: GENERAL RADIOLOGY | Age: 58
End: 2018-04-06
Attending: EMERGENCY MEDICINE
Payer: MEDICARE

## 2018-04-06 VITALS
RESPIRATION RATE: 15 BRPM | WEIGHT: 180 LBS | SYSTOLIC BLOOD PRESSURE: 149 MMHG | BODY MASS INDEX: 33.13 KG/M2 | OXYGEN SATURATION: 97 % | HEART RATE: 80 BPM | TEMPERATURE: 97.4 F | HEIGHT: 62 IN | DIASTOLIC BLOOD PRESSURE: 76 MMHG

## 2018-04-06 DIAGNOSIS — K59.03 DRUG-INDUCED CONSTIPATION: Primary | ICD-10-CM

## 2018-04-06 LAB
ALBUMIN SERPL-MCNC: 3 G/DL (ref 3.5–5)
ALBUMIN/GLOB SERPL: 0.6 {RATIO} (ref 1.1–2.2)
ALP SERPL-CCNC: 105 U/L (ref 45–117)
ALT SERPL-CCNC: 18 U/L (ref 12–78)
ANION GAP SERPL CALC-SCNC: 6 MMOL/L (ref 5–15)
APPEARANCE UR: CLEAR
AST SERPL-CCNC: 17 U/L (ref 15–37)
BACTERIA URNS QL MICRO: NEGATIVE /HPF
BASOPHILS # BLD: 0 K/UL (ref 0–0.1)
BASOPHILS NFR BLD: 0 % (ref 0–1)
BILIRUB SERPL-MCNC: 0.3 MG/DL (ref 0.2–1)
BILIRUB UR QL: NEGATIVE
BUN SERPL-MCNC: 9 MG/DL (ref 6–20)
BUN/CREAT SERPL: 9 (ref 12–20)
CALCIUM SERPL-MCNC: 9.1 MG/DL (ref 8.5–10.1)
CHLORIDE SERPL-SCNC: 102 MMOL/L (ref 97–108)
CO2 SERPL-SCNC: 31 MMOL/L (ref 21–32)
COLOR UR: NORMAL
CREAT SERPL-MCNC: 1.02 MG/DL (ref 0.55–1.02)
DIFFERENTIAL METHOD BLD: ABNORMAL
EOSINOPHIL # BLD: 0.4 K/UL (ref 0–0.4)
EOSINOPHIL NFR BLD: 4 % (ref 0–7)
EPITH CASTS URNS QL MICRO: NORMAL /LPF
ERYTHROCYTE [DISTWIDTH] IN BLOOD BY AUTOMATED COUNT: 13 % (ref 11.5–14.5)
GLOBULIN SER CALC-MCNC: 5.2 G/DL (ref 2–4)
GLUCOSE SERPL-MCNC: 129 MG/DL (ref 65–100)
GLUCOSE UR STRIP.AUTO-MCNC: NEGATIVE MG/DL
HCT VFR BLD AUTO: 42 % (ref 35–47)
HGB BLD-MCNC: 13.2 G/DL (ref 11.5–16)
HGB UR QL STRIP: NEGATIVE
HYALINE CASTS URNS QL MICRO: NORMAL /LPF (ref 0–5)
IMM GRANULOCYTES # BLD: 0 K/UL (ref 0–0.04)
IMM GRANULOCYTES NFR BLD AUTO: 0 % (ref 0–0.5)
KETONES UR QL STRIP.AUTO: NEGATIVE MG/DL
LEUKOCYTE ESTERASE UR QL STRIP.AUTO: NEGATIVE
LIPASE SERPL-CCNC: 119 U/L (ref 73–393)
LYMPHOCYTES # BLD: 3.4 K/UL (ref 0.8–3.5)
LYMPHOCYTES NFR BLD: 38 % (ref 12–49)
MCH RBC QN AUTO: 29.9 PG (ref 26–34)
MCHC RBC AUTO-ENTMCNC: 31.4 G/DL (ref 30–36.5)
MCV RBC AUTO: 95 FL (ref 80–99)
MONOCYTES # BLD: 0.6 K/UL (ref 0–1)
MONOCYTES NFR BLD: 7 % (ref 5–13)
NEUTS SEG # BLD: 4.6 K/UL (ref 1.8–8)
NEUTS SEG NFR BLD: 51 % (ref 32–75)
NITRITE UR QL STRIP.AUTO: NEGATIVE
NRBC # BLD: 0 K/UL (ref 0–0.01)
NRBC BLD-RTO: 0 PER 100 WBC
PH UR STRIP: 7.5 [PH] (ref 5–8)
PLATELET # BLD AUTO: 424 K/UL (ref 150–400)
PMV BLD AUTO: 10.1 FL (ref 8.9–12.9)
POTASSIUM SERPL-SCNC: 4.4 MMOL/L (ref 3.5–5.1)
PROT SERPL-MCNC: 8.2 G/DL (ref 6.4–8.2)
PROT UR STRIP-MCNC: NEGATIVE MG/DL
RBC # BLD AUTO: 4.42 M/UL (ref 3.8–5.2)
RBC #/AREA URNS HPF: NORMAL /HPF (ref 0–5)
SODIUM SERPL-SCNC: 139 MMOL/L (ref 136–145)
SP GR UR REFRACTOMETRY: 1.01 (ref 1–1.03)
UR CULT HOLD, URHOLD: NORMAL
UROBILINOGEN UR QL STRIP.AUTO: 0.2 EU/DL (ref 0.2–1)
WBC # BLD AUTO: 9.1 K/UL (ref 3.6–11)
WBC URNS QL MICRO: NORMAL /HPF (ref 0–4)

## 2018-04-06 PROCEDURE — 83690 ASSAY OF LIPASE: CPT | Performed by: EMERGENCY MEDICINE

## 2018-04-06 PROCEDURE — 36415 COLL VENOUS BLD VENIPUNCTURE: CPT | Performed by: EMERGENCY MEDICINE

## 2018-04-06 PROCEDURE — 80053 COMPREHEN METABOLIC PANEL: CPT | Performed by: EMERGENCY MEDICINE

## 2018-04-06 PROCEDURE — 74019 RADEX ABDOMEN 2 VIEWS: CPT

## 2018-04-06 PROCEDURE — 81001 URINALYSIS AUTO W/SCOPE: CPT | Performed by: EMERGENCY MEDICINE

## 2018-04-06 PROCEDURE — 99282 EMERGENCY DEPT VISIT SF MDM: CPT

## 2018-04-06 PROCEDURE — 85025 COMPLETE CBC W/AUTO DIFF WBC: CPT | Performed by: EMERGENCY MEDICINE

## 2018-04-06 NOTE — ED TRIAGE NOTES
Pt c/o constipation starting 1 1/2 week ago. Pt states she tried OTC medications. C/o back and abdominal pain, denies chest pain and SOB

## 2018-04-06 NOTE — DISCHARGE INSTRUCTIONS
We hope that we have addressed all of your medical concerns. The examination and treatment you received in the Emergency Department were for an emergent problem and were not intended as complete care. It is important that you follow up with your healthcare provider(s) for ongoing care. If your symptoms worsen or do not improve as expected, and you are unable to reach your usual health care provider(s), you should return to the Emergency Department. Today's healthcare is undergoing tremendous change, and patient satisfaction surveys are one of the many tools to assess the quality of medical care. You may receive a survey from the [employer] regarding your experience in the Emergency Department. I hope that your experience has been completely positive, particularly the medical care that I provided. As such, please participate in the survey; anything less than excellent does not meet my expectations or intentions. [facility] and [address] [place] [address] participate in nationally recognized quality of care measures. If your blood pressure is greater than 120/80, as reported below, we urge that you seek medical care to address the potential of high blood pressure, commonly known as hypertension. Hypertension can be hereditary or can be caused by certain medical conditions, pain, stress, or \"white coat syndrome. \"       Please make an appointment with your health care provider(s) for follow up of your Emergency Department visit. VITALS:   Patient Vitals for the past 8 hrs:   Temp Pulse Resp BP SpO2   04/06/18 1652 97.4 °F (36.3 °C) 84 18 158/84 97 %          Thank you for allowing us to provide you with medical care today. We realize that you have many choices for your emergency care needs. Please choose us in the future for any continued health care needs.       Devora Trejo MD    [facility], 5 [facility]. Office: [phone]            Recent Results (from the past 24 hour(s))   CBC WITH AUTOMATED DIFF    Collection Time: 04/06/18  6:29 PM   Result Value Ref Range    WBC 9.1 3.6 - 11.0 K/uL    RBC 4.42 3.80 - 5.20 M/uL    HGB 13.2 11.5 - 16.0 g/dL    HCT 42.0 35.0 - 47.0 %    MCV 95.0 80.0 - 99.0 FL    MCH 29.9 26.0 - 34.0 PG    MCHC 31.4 30.0 - 36.5 g/dL    RDW 13.0 11.5 - 14.5 %    PLATELET 695 (H) 966 - 400 K/uL    MPV 10.1 8.9 - 12.9 FL    NRBC 0.0 0  WBC    ABSOLUTE NRBC 0.00 0.00 - 0.01 K/uL    NEUTROPHILS 51 32 - 75 %    LYMPHOCYTES 38 12 - 49 %    MONOCYTES 7 5 - 13 %    EOSINOPHILS 4 0 - 7 %    BASOPHILS 0 0 - 1 %    IMMATURE GRANULOCYTES 0 0.0 - 0.5 %    ABS. NEUTROPHILS 4.6 1.8 - 8.0 K/UL    ABS. LYMPHOCYTES 3.4 0.8 - 3.5 K/UL    ABS. MONOCYTES 0.6 0.0 - 1.0 K/UL    ABS. EOSINOPHILS 0.4 0.0 - 0.4 K/UL    ABS. BASOPHILS 0.0 0.0 - 0.1 K/UL    ABS. IMM. GRANS. 0.0 0.00 - 0.04 K/UL    DF AUTOMATED     METABOLIC PANEL, COMPREHENSIVE    Collection Time: 04/06/18  6:29 PM   Result Value Ref Range    Sodium 139 136 - 145 mmol/L    Potassium 4.4 3.5 - 5.1 mmol/L    Chloride 102 97 - 108 mmol/L    CO2 31 21 - 32 mmol/L    Anion gap 6 5 - 15 mmol/L    Glucose 129 (H) 65 - 100 mg/dL    BUN 9 6 - 20 MG/DL    Creatinine 1.02 0.55 - 1.02 MG/DL    BUN/Creatinine ratio 9 (L) 12 - 20      GFR est AA >60 >60 ml/min/1.73m2    GFR est non-AA 56 (L) >60 ml/min/1.73m2    Calcium 9.1 8.5 - 10.1 MG/DL    Bilirubin, total 0.3 0.2 - 1.0 MG/DL    ALT (SGPT) 18 12 - 78 U/L    AST (SGOT) 17 15 - 37 U/L    Alk.  phosphatase 105 45 - 117 U/L    Protein, total 8.2 6.4 - 8.2 g/dL    Albumin 3.0 (L) 3.5 - 5.0 g/dL    Globulin 5.2 (H) 2.0 - 4.0 g/dL    A-G Ratio 0.6 (L) 1.1 - 2.2     LIPASE    Collection Time: 04/06/18  6:29 PM   Result Value Ref Range    Lipase 119 73 - 393 U/L   URINALYSIS W/MICROSCOPIC    Collection Time: 04/06/18  6:29 PM   Result Value Ref Range    Color YELLOW/STRAW      Appearance CLEAR CLEAR      Specific gravity 1.007 1.003 - 1.030      pH (UA) 7.5 5.0 - 8.0      Protein NEGATIVE  NEG mg/dL    Glucose NEGATIVE  NEG mg/dL    Ketone NEGATIVE  NEG mg/dL    Bilirubin NEGATIVE  NEG      Blood NEGATIVE  NEG      Urobilinogen 0.2 0.2 - 1.0 EU/dL    Nitrites NEGATIVE  NEG      Leukocyte Esterase NEGATIVE  NEG      WBC 0-4 0 - 4 /hpf    RBC 0-5 0 - 5 /hpf    Epithelial cells FEW FEW /lpf    Bacteria NEGATIVE  NEG /hpf    Hyaline cast 0-2 0 - 5 /lpf   URINE CULTURE HOLD SAMPLE    Collection Time: 04/06/18  6:29 PM   Result Value Ref Range    Urine culture hold        URINE ON HOLD IN MICROBIOLOGY DEPT FOR 3 DAYS. IF UNPRESERVED URINE IS SUBMITTED, IT CANNOT BE USED FOR ADDITIONAL TESTING AFTER 24 HRS, RECOLLECTION WILL BE REQUIRED. Xr Abd Flat/ Erect    Result Date: 4/6/2018  INDICATION:   abd pain unable to have bm EXAM: 2 VIEW ABDOMEN RADIOGRAPH COMPARISON: None. FINDINGS: Supine and upright views of the abdomen demonstrate a nonspecific gas pattern, with small amounts of air scattered throughout small and large bowel. Retained fecal material appears moderate in amount. Small air-fluid levels are thought to be present in colon. There is no free intraperitoneal air. No soft tissue masses or pathologic calcifications are seen. The bones and soft tissues are within normal limits. IMPRESSION: Nonobstructive bowel gas pattern. Moderate amount of retained fecal material without colonic distention. .       Constipation: Care Instructions  Your Care Instructions    Constipation means that you have a hard time passing stools (bowel movements). People pass stools from 3 times a day to once every 3 days. What is normal for you may be different. Constipation may occur with pain in the rectum and cramping. The pain may get worse when you try to pass stools. Sometimes there are small amounts of bright red blood on toilet paper or the surface of stools. This is because of enlarged veins near the rectum (hemorrhoids).   A few changes in your diet and lifestyle may help you avoid ongoing constipation. Your doctor may also prescribe medicine to help loosen your stool. Some medicines can cause constipation. These include pain medicines and antidepressants. Tell your doctor about all the medicines you take. Your doctor may want to make a medicine change to ease your symptoms. Follow-up care is a key part of your treatment and safety. Be sure to make and go to all appointments, and call your doctor if you are having problems. It's also a good idea to know your test results and keep a list of the medicines you take. How can you care for yourself at home? · Drink plenty of fluids, enough so that your urine is light yellow or clear like water. If you have kidney, heart, or liver disease and have to limit fluids, talk with your doctor before you increase the amount of fluids you drink. · Include high-fiber foods in your diet each day. These include fruits, vegetables, beans, and whole grains. · Get at least 30 minutes of exercise on most days of the week. Walking is a good choice. You also may want to do other activities, such as running, swimming, cycling, or playing tennis or team sports. · Take a fiber supplement, such as Citrucel or Metamucil, every day. Read and follow all instructions on the label. · Schedule time each day for a bowel movement. A daily routine may help. Take your time having your bowel movement. · Support your feet with a small step stool when you sit on the toilet. This helps flex your hips and places your pelvis in a squatting position. · Your doctor may recommend an over-the-counter laxative to relieve your constipation. Examples are Milk of Magnesia and MiraLax. Read and follow all instructions on the label. Do not use laxatives on a long-term basis. When should you call for help? Call your doctor now or seek immediate medical care if:  ? · You have new or worse belly pain.    ? · You have new or worse nausea or vomiting. ? · You have blood in your stools. ? Watch closely for changes in your health, and be sure to contact your doctor if:  ? · Your constipation is getting worse. ? · You do not get better as expected. Where can you learn more? Go to http://lauro-oksana.info/. Enter 21  in the search box to learn more about \"Constipation: Care Instructions. \"  Current as of: March 20, 2017  Content Version: 11.4  © 9865-7456 Healthwise, [employer]. Care instructions adapted under license by [employer] (which disclaims liability or warranty for this information). If you have questions about a medical condition or this instruction, always ask your healthcare professional. [address] any warranty or liability for your use of this information.

## 2018-04-06 NOTE — ED PROVIDER NOTES
HPI Comments: 62 y.o. female with past medical history significant for depression, HTN, anxiety, hypercholesterolemia, DM, asthma, CVA, cerebral aneurysm, and PVD who presents accompanied with chief complaint of constipation. The pt c/o constipation for the past month. The pt states that she has not had a BM in about a full month. The pt reports trying OTC treatments including laxatives, magnesium citrate, milk of magnesia, and an enema without improvement of her constipation. The pt reports nausea, diffuse abdominal pain, and low back pain associated with her constipation. The pt states that eating does not exacerbate her abdominal pain. She reports a decreased appetite secondary to her constipation, but she has been drinking well. The pt explains that she presented to [facility] where her BP was 144/78 and they instructed her to present to the ED for further evaluation. The pt states that her blood glucose levels are fine. She notes a h/o tubal ligation. Denies vomiting, urinary symptoms, h/o diverticulitis, fever, CP, SOB, h/o CHF. There are no other acute medical concerns at this time. Old Chart Review: Medical records indicate the pt was prescribed Tylenol 3 on 3/20/2018 (17 days ago)    Social hx: current smoker  PCP: Blade Beth DO    Note written by Ashley Amor, as dictated by Rosie Kussmaul, MD 6:11 PM      The history is provided by the patient. No  was used.         Past Medical History:   Diagnosis Date    Aneurysm ([address]     cerebral    Anxiety     Asthma     Depression     Diabetes ([facility] [address]     Headache(784.0)     Hypercholesterolemia     Hypertension     Other ill-defined conditions(799.89)     high cholesterol    Other ill-defined conditions(799.89)     pvd    Stroke ([address] 2006    no residual       Past Surgical History:   Procedure Laterality Date    ANEURYSM, INTRACRAN, SIMPLE SURG      HX GYN      HX OTHER SURGICAL      excision cyst/bilateral axilla         Family History:   Problem Relation Age of Onset    Hypertension Mother     Cancer Father     Breast Cancer Maternal Aunt     Breast Cancer Maternal Aunt        Social History     Social History    Marital status: SINGLE     Spouse name: N/A    Number of children: N/A    Years of education: N/A     Occupational History    Not on file. Social History Main Topics    Smoking status: Current Every Day Smoker     Packs/day: 1.00     Years: 35.00    Smokeless tobacco: Never Used    Alcohol use Yes      Comment: occasional    Drug use: No    Sexual activity: Not on file     Other Topics Concern    Not on file     Social History Narrative         ALLERGIES: Ciprofloxacin    Review of Systems   Constitutional: Positive for appetite change (decreased). Negative for fever. Respiratory: Negative for shortness of breath. Cardiovascular: Negative for chest pain. Gastrointestinal: Positive for abdominal pain, constipation and nausea. Negative for diarrhea and vomiting. Genitourinary: Negative for difficulty urinating, dysuria and hematuria. Musculoskeletal: Positive for back pain (low). All other systems reviewed and are negative. Vitals:    04/06/18 1652   BP: 158/84   Pulse: 84   Resp: 18   Temp: 97.4 °F (36.3 °C)   SpO2: 97%   Weight: 81.6 kg (180 lb)   Height: 5' 2\" (1.575 m)            Physical Exam   Constitutional: She is oriented to person, place, and time. She appears well-developed and well-nourished. No distress. HENT:   Head: Normocephalic and atraumatic. Eyes: Conjunctivae are normal.   Neck: Normal range of motion. Cardiovascular: Normal rate, regular rhythm, normal heart sounds and intact distal pulses. Exam reveals no friction rub. No murmur heard. Pulmonary/Chest: Effort normal and breath sounds normal. No respiratory distress. She has no wheezes. She has no rales. She exhibits no tenderness. Abdominal: Soft.  Bowel sounds are normal. She exhibits no distension. There is no tenderness. There is no rebound and no guarding. Musculoskeletal: Normal range of motion. Neurological: She is alert and oriented to person, place, and time. Coordination normal.   Skin: Skin is warm and dry. She is not diaphoretic. No pallor. Psychiatric: She has a normal mood and affect. Her behavior is normal.   Nursing note and vitals reviewed. MDM  Number of Diagnoses or Management Options  Drug-induced constipation:   Diagnosis management comments: Benign abdomen doubt sbo. Check xray likely constipation from narcotics or if blood sugars have been elevated. Amount and/or Complexity of Data Reviewed  Clinical lab tests: ordered and reviewed  Tests in the radiology section of CPT®: ordered and reviewed    Patient Progress  Patient progress: stable        ED Course       Procedures    7:06 PM  Discussed with pt and she would like to try golytely.  She will follow up with pcp and return if worsening sx

## 2018-04-09 ENCOUNTER — DOCUMENTATION ONLY (OUTPATIENT)
Dept: FAMILY MEDICINE CLINIC | Age: 58
End: 2018-04-09

## 2018-04-09 NOTE — PROGRESS NOTES
Urmila inhalation/albuterol order was put on [facility] desk to process

## 2018-04-12 RX ORDER — MAG HYDROX/ALUMINUM HYD/SIMETH 200-200-20
30 SUSPENSION, ORAL (FINAL DOSE FORM) ORAL
COMMUNITY
End: 2018-04-20

## 2018-04-17 RX ORDER — RIVAROXABAN 20 MG/1
TABLET, FILM COATED ORAL
Qty: 30 TAB | Refills: 0 | Status: SHIPPED | OUTPATIENT
Start: 2018-04-17 | End: 2018-05-18 | Stop reason: SDUPTHER

## 2018-04-19 ENCOUNTER — APPOINTMENT (OUTPATIENT)
Dept: GENERAL RADIOLOGY | Age: 58
End: 2018-04-19
Attending: EMERGENCY MEDICINE
Payer: MEDICARE

## 2018-04-19 ENCOUNTER — TELEPHONE (OUTPATIENT)
Dept: CARDIOLOGY CLINIC | Age: 58
End: 2018-04-19

## 2018-04-19 ENCOUNTER — HOSPITAL ENCOUNTER (EMERGENCY)
Age: 58
Discharge: HOME | End: 2018-04-19
Attending: EMERGENCY MEDICINE
Payer: MEDICARE

## 2018-04-19 VITALS
DIASTOLIC BLOOD PRESSURE: 59 MMHG | BODY MASS INDEX: 33.13 KG/M2 | OXYGEN SATURATION: 97 % | TEMPERATURE: 97.7 F | SYSTOLIC BLOOD PRESSURE: 125 MMHG | HEART RATE: 52 BPM | HEIGHT: 62 IN | WEIGHT: 180 LBS | RESPIRATION RATE: 16 BRPM

## 2018-04-19 DIAGNOSIS — F17.200 SMOKING: ICD-10-CM

## 2018-04-19 DIAGNOSIS — R07.9 CHEST PAIN, UNSPECIFIED TYPE: Primary | ICD-10-CM

## 2018-04-19 LAB
ALBUMIN SERPL-MCNC: 3.7 G/DL (ref 3.5–5)
ALBUMIN/GLOB SERPL: 0.9 {RATIO} (ref 1.1–2.2)
ALP SERPL-CCNC: 105 U/L (ref 45–117)
ALT SERPL-CCNC: 19 U/L (ref 12–78)
ANION GAP SERPL CALC-SCNC: 9 MMOL/L (ref 5–15)
AST SERPL-CCNC: 17 U/L (ref 15–37)
BASOPHILS # BLD: 0.1 K/UL (ref 0–0.1)
BASOPHILS NFR BLD: 1 % (ref 0–1)
BILIRUB SERPL-MCNC: 0.3 MG/DL (ref 0.2–1)
BNP SERPL-MCNC: 144 PG/ML (ref 0–125)
BUN SERPL-MCNC: 7 MG/DL (ref 6–20)
BUN/CREAT SERPL: 8 (ref 12–20)
CALCIUM SERPL-MCNC: 9.4 MG/DL (ref 8.5–10.1)
CHLORIDE SERPL-SCNC: 102 MMOL/L (ref 97–108)
CK MB CFR SERPL CALC: NORMAL % (ref 0–2.5)
CK MB SERPL-MCNC: <1 NG/ML (ref 5–25)
CK SERPL-CCNC: 43 U/L (ref 26–192)
CO2 SERPL-SCNC: 27 MMOL/L (ref 21–32)
CREAT SERPL-MCNC: 0.85 MG/DL (ref 0.55–1.02)
DIFFERENTIAL METHOD BLD: NORMAL
EOSINOPHIL # BLD: 0.1 K/UL (ref 0–0.4)
EOSINOPHIL NFR BLD: 1 % (ref 0–7)
ERYTHROCYTE [DISTWIDTH] IN BLOOD BY AUTOMATED COUNT: 13.2 % (ref 11.5–14.5)
GLOBULIN SER CALC-MCNC: 4.3 G/DL (ref 2–4)
GLUCOSE SERPL-MCNC: 138 MG/DL (ref 65–100)
HCT VFR BLD AUTO: 42.7 % (ref 35–47)
HGB BLD-MCNC: 13.8 G/DL (ref 11.5–16)
IMM GRANULOCYTES # BLD: 0 K/UL (ref 0–0.04)
IMM GRANULOCYTES NFR BLD AUTO: 0 % (ref 0–0.5)
LIPASE SERPL-CCNC: 112 U/L (ref 73–393)
LYMPHOCYTES # BLD: 3.5 K/UL (ref 0.8–3.5)
LYMPHOCYTES NFR BLD: 35 % (ref 12–49)
MCH RBC QN AUTO: 29.8 PG (ref 26–34)
MCHC RBC AUTO-ENTMCNC: 32.3 G/DL (ref 30–36.5)
MCV RBC AUTO: 92.2 FL (ref 80–99)
MONOCYTES # BLD: 0.6 K/UL (ref 0–1)
MONOCYTES NFR BLD: 6 % (ref 5–13)
NEUTS SEG # BLD: 5.7 K/UL (ref 1.8–8)
NEUTS SEG NFR BLD: 57 % (ref 32–75)
NRBC # BLD: 0 K/UL (ref 0–0.01)
NRBC BLD-RTO: 0 PER 100 WBC
PLATELET # BLD AUTO: 292 K/UL (ref 150–400)
PMV BLD AUTO: 10.6 FL (ref 8.9–12.9)
POTASSIUM SERPL-SCNC: 4.1 MMOL/L (ref 3.5–5.1)
PROT SERPL-MCNC: 8 G/DL (ref 6.4–8.2)
RBC # BLD AUTO: 4.63 M/UL (ref 3.8–5.2)
SODIUM SERPL-SCNC: 138 MMOL/L (ref 136–145)
TROPONIN I SERPL-MCNC: <0.04 NG/ML
WBC # BLD AUTO: 10 K/UL (ref 3.6–11)

## 2018-04-19 PROCEDURE — 74011000250 HC RX REV CODE- 250: Performed by: EMERGENCY MEDICINE

## 2018-04-19 PROCEDURE — 99284 EMERGENCY DEPT VISIT MOD MDM: CPT

## 2018-04-19 PROCEDURE — 84484 ASSAY OF TROPONIN QUANT: CPT | Performed by: EMERGENCY MEDICINE

## 2018-04-19 PROCEDURE — 83690 ASSAY OF LIPASE: CPT | Performed by: EMERGENCY MEDICINE

## 2018-04-19 PROCEDURE — 93005 ELECTROCARDIOGRAM TRACING: CPT

## 2018-04-19 PROCEDURE — 36415 COLL VENOUS BLD VENIPUNCTURE: CPT | Performed by: EMERGENCY MEDICINE

## 2018-04-19 PROCEDURE — 85025 COMPLETE CBC W/AUTO DIFF WBC: CPT | Performed by: EMERGENCY MEDICINE

## 2018-04-19 PROCEDURE — 74011250637 HC RX REV CODE- 250/637: Performed by: EMERGENCY MEDICINE

## 2018-04-19 PROCEDURE — 83880 ASSAY OF NATRIURETIC PEPTIDE: CPT | Performed by: EMERGENCY MEDICINE

## 2018-04-19 PROCEDURE — 71046 X-RAY EXAM CHEST 2 VIEWS: CPT

## 2018-04-19 PROCEDURE — 82550 ASSAY OF CK (CPK): CPT | Performed by: EMERGENCY MEDICINE

## 2018-04-19 PROCEDURE — 80053 COMPREHEN METABOLIC PANEL: CPT | Performed by: EMERGENCY MEDICINE

## 2018-04-19 RX ORDER — NITROGLYCERIN 0.4 MG/1
0.4 TABLET SUBLINGUAL
Status: DISCONTINUED | OUTPATIENT
Start: 2018-04-19 | End: 2018-04-19 | Stop reason: HOSPADM

## 2018-04-19 RX ORDER — GUAIFENESIN 100 MG/5ML
325 LIQUID (ML) ORAL
Status: COMPLETED | OUTPATIENT
Start: 2018-04-19 | End: 2018-04-19

## 2018-04-19 RX ORDER — GUAIFENESIN 100 MG/5ML
325 LIQUID (ML) ORAL
Status: DISCONTINUED | OUTPATIENT
Start: 2018-04-19 | End: 2018-04-19

## 2018-04-19 RX ADMIN — LIDOCAINE HYDROCHLORIDE 40 ML: 20 SOLUTION ORAL; TOPICAL at 17:30

## 2018-04-19 RX ADMIN — NITROGLYCERIN 0.4 MG: 0.4 TABLET SUBLINGUAL at 17:30

## 2018-04-19 RX ADMIN — ASPIRIN 81 MG 324 MG: 81 TABLET ORAL at 16:01

## 2018-04-19 NOTE — DISCHARGE INSTRUCTIONS
Chest Pain: Care Instructions  Your Care Instructions    There are many things that can cause chest pain. Some are not serious and will get better on their own in a few days. But some kinds of chest pain need more testing and treatment. Your doctor may have recommended a follow-up visit in the next 8 to 12 hours. If you are not getting better, you may need more tests or treatment. Even though your doctor has released you, you still need to watch for any problems. The doctor carefully checked you, but sometimes problems can develop later. If you have new symptoms or if your symptoms do not get better, get medical care right away. If you have worse or different chest pain or pressure that lasts more than 5 minutes or you passed out (lost consciousness), call 911 or seek other emergency help right away. A medical visit is only one step in your treatment. Even if you feel better, you still need to do what your doctor recommends, such as going to all suggested follow-up appointments and taking medicines exactly as directed. This will help you recover and help prevent future problems. How can you care for yourself at home? · Rest until you feel better. · Take your medicine exactly as prescribed. Call your doctor if you think you are having a problem with your medicine. · Do not drive after taking a prescription pain medicine. When should you call for help? Call 911 if:  ? · You passed out (lost consciousness). ? · You have severe difficulty breathing. ? · You have symptoms of a heart attack. These may include:  ¨ Chest pain or pressure, or a strange feeling in your chest.  ¨ Sweating. ¨ Shortness of breath. ¨ Nausea or vomiting. ¨ Pain, pressure, or a strange feeling in your back, neck, jaw, or upper belly or in one or both shoulders or arms. ¨ Lightheadedness or sudden weakness. ¨ A fast or irregular heartbeat.   After you call 911, the  may tell you to chew 1 adult-strength or 2 to 4 low-dose aspirin. Wait for an ambulance. Do not try to drive yourself. ?Call your doctor today if:  ? · You have any trouble breathing. ? · Your chest pain gets worse. ? · You are dizzy or lightheaded, or you feel like you may faint. ? · You are not getting better as expected. ? · You are having new or different chest pain. Where can you learn more? Go to http://lauro-oksana.info/. Enter A120 in the search box to learn more about \"Chest Pain: Care Instructions. \"  Current as of: March 20, 2017  Content Version: 11.4  © 0845-8141 [employer] ([employer]). Care instructions adapted under license by [employer] (which disclaims liability or warranty for this information). If you have questions about a medical condition or this instruction, always ask your healthcare professional. [place] [address] any warranty or liability for your use of this information. Stopping Smokeless Tobacco Use: Care Instructions  Your Care Instructions    Smokeless tobacco comes in many forms, such as snuff and chewing tobacco:  · Snuff is finely ground tobacco sold in cans or pouches. Most of the time, snuff is used by putting a \"pinch\" or \"dip\" between the lower lip or cheek and the gum. · Chewing tobacco is sold as loose leaves, plugs, or twists. It is chewed or placed between the cheek and the gum or teeth. There are plenty of reasons to stop using smokeless tobacco. These products are harmful. They are not risk-free alternatives to smoking. Smokeless tobacco contains nicotine, which is addicting. Though using smokeless tobacco is less harmful than smoking cigarettes, it can cause serious health problems, such as:  · White patches or red sores in your mouth that can turn into mouth cancer involving the lip, tongue, or cheek. · Tooth loss and other dental problems. · Gum disease. Your gums may pull away from your teeth and not grow back.   People who use smokeless tobacco crave the nicotine in it. Giving up smokeless tobacco is much harder than simply changing a habit. Your body has to stop craving the nicotine. It is hard to quit, but you can do it. Many tools are available for people who want to quit using smokeless tobacco. You may find that combining tools works best for you. There are several steps to quitting. First you get ready to quit. Then you get support to help you. After that, you learn new skills and behaviors to quit. For many people, a necessary step is getting and using medicine. Your doctor will help you set up the plan that best meets your needs. You may want to attend a tobacco cessation program. When you choose a program, look for one that has proven success. Ask your doctor for ideas. You will greatly increase your chances of success if you take medicine as well as get counseling or join a cessation program.  Some of the changes you feel when you first quit smokeless tobacco are uncomfortable. Your body will miss the nicotine at first, and you may feel short-tempered and grumpy. You may have trouble sleeping or concentrating. Medicine can help you deal with these symptoms. You may struggle with changing your habits and rituals. The last step is the tricky one: Be prepared for the urge to use smokeless tobacco to continue for a time. This is a lot to deal with, but keep at it. You will feel better. Follow-up care is a key part of your treatment and safety. Be sure to make and go to all appointments, and call your doctor if you are having problems. It's also a good idea to know your test results and keep a list of the medicines you take. How can you care for yourself at home? · Ask your family, friends, and coworkers for support. You have a better chance of quitting if you have help and support. · Join a support group for people who are trying to quit using smokeless tobacco.  · Set a quit date.  Pick your date carefully so that it is not right in the middle of a big deadline or stressful time. After you quit, do not use smokeless tobacco even once. Get rid of all spit cups, cans, and pouches after your last use. Clean your house and your clothes so that they do not smell of tobacco.  · Learn how to be a non-user. Think about ways you can avoid those things that make you reach for tobacco.  ¨ Learn some ways to deal with cravings, like calling a friend or going for a walk. Cravings often pass. ¨ Avoid situations that put you at greatest risk for using smokeless tobacco. For some people, it is hard to spend time with friends without dipping or chewing. For others, they might skip a coffee break with coworkers who smoke or use smokeless tobacco.  ¨ Change your daily routine. Take a different route to work, or eat a meal in a different place. · Cut down on stress. Calm yourself or release tension by doing an activity you enjoy, such as reading a book, taking a hot bath, or gardening. · Talk to your doctor or pharmacist about nicotine replacement therapy. You still get nicotine, but you do not use tobacco. Nicotine replacement products help you slowly reduce the amount of nicotine you need. Many of these products are available over the counter. They include nicotine patches, gum, lozenges, and inhalers. · Ask your doctor about bupropion (Wellbutrin) or varenicline (Chantix), which are prescription medicines. They do not contain nicotine. They help you by reducing withdrawal symptoms, such as stress and anxiety. · Get regular exercise. Having healthy habits will help your body move past its craving for nicotine. · Be prepared to keep trying. Most people are not successful the first few times they try to quit. Do not get mad at yourself if you use tobacco again. Make a list of things you learned, and think about when you want to try again, such as next week, next month, or next year. Where can you learn more?   Go to http://lauro-oksana.info/. Enter A835 in the search box to learn more about \"Stopping Smokeless Tobacco Use: Care Instructions. \"  Current as of: March 20, 2017  Content Version: 11.4  © 1171-1342 [employer]. Care instructions adapted under license by [employer] (which disclaims liability or warranty for this information). If you have questions about a medical condition or this instruction, always ask your healthcare professional. [address] any warranty or liability for your use of this information. We hope that we have addressed all of your medical concerns. The examination and treatment you received in the Emergency Department were for an emergent problem and were not intended as complete care. It is important that you follow up with your healthcare provider(s) for ongoing care. If your symptoms worsen or do not improve as expected, and you are unable to reach your usual health care provider(s), you should return to the Emergency Department. Today's healthcare is undergoing tremendous change, and patient satisfaction surveys are one of the many tools to assess the quality of medical care. You may receive a survey from the [facility] regarding your experience in the Emergency Department. I hope that your experience has been completely positive, particularly the medical care that I provided. As such, please participate in the survey; anything less than excellent does not meet my expectations or intentions. [facility] and [facility] participate in nationally recognized quality of care measures. If your blood pressure is greater than 120/80, as reported below, we urge that you seek medical care to address the potential of high blood pressure, commonly known as hypertension.    Hypertension can be hereditary or can be caused by certain medical conditions, pain, stress, or \"white coat syndrome. \"       Please make an appointment with your health care provider(s) for follow up of your Emergency Department visit. VITALS:   Patient Vitals for the past 8 hrs:   Temp Pulse Resp BP SpO2   04/19/18 1504 97.7 °F (36.5 °C) 97 18 182/88 98 %          Thank you for allowing us to provide you with medical care today. We realize that you have many choices for your emergency care needs. Please choose us in the future for any continued health care needs. [address], [address] [facility].   Office: [phone]            Recent Results (from the past 24 hour(s))   EKG, 12 LEAD, INITIAL    Collection Time: 04/19/18  3:10 PM   Result Value Ref Range    Ventricular Rate 92 BPM    Atrial Rate 92 BPM    P-R Interval 162 ms    QRS Duration 90 ms    Q-T Interval 384 ms    QTC Calculation (Bezet) 474 ms    Calculated P Axis 57 degrees    Calculated R Axis 27 degrees    Calculated T Axis 72 degrees    Diagnosis       Normal sinus rhythm  Possible Left atrial enlargement  Nonspecific T wave abnormality  Prolonged QT  Abnormal ECG  When compared with ECG of 15-NOV-2015 18:18,  Nonspecific T wave abnormality, worse in Lateral leads     CBC WITH AUTOMATED DIFF    Collection Time: 04/19/18  3:58 PM   Result Value Ref Range    WBC 10.0 3.6 - 11.0 K/uL    RBC 4.63 3.80 - 5.20 M/uL    HGB 13.8 11.5 - 16.0 g/dL    HCT 42.7 35.0 - 47.0 %    MCV 92.2 80.0 - 99.0 FL    MCH 29.8 26.0 - 34.0 PG    MCHC 32.3 30.0 - 36.5 g/dL    RDW 13.2 11.5 - 14.5 %    PLATELET 137 410 - 214 K/uL    MPV 10.6 8.9 - 12.9 FL    NRBC 0.0 0  WBC    ABSOLUTE NRBC 0.00 0.00 - 0.01 K/uL    NEUTROPHILS 57 32 - 75 %    LYMPHOCYTES 35 12 - 49 %    MONOCYTES 6 5 - 13 %    EOSINOPHILS 1 0 - 7 %    BASOPHILS 1 0 - 1 %    IMMATURE GRANULOCYTES 0 0.0 - 0.5 %    ABS. NEUTROPHILS 5.7 1.8 - 8.0 K/UL    ABS. LYMPHOCYTES 3.5 0.8 - 3.5 K/UL    ABS. MONOCYTES 0.6 0.0 - 1.0 K/UL    ABS.  EOSINOPHILS 0.1 0.0 - 0.4 K/UL    ABS. BASOPHILS 0.1 0.0 - 0.1 K/UL    ABS. IMM. GRANS. 0.0 0.00 - 0.04 K/UL    DF AUTOMATED     METABOLIC PANEL, COMPREHENSIVE    Collection Time: 04/19/18  3:58 PM   Result Value Ref Range    Sodium 138 136 - 145 mmol/L    Potassium 4.1 3.5 - 5.1 mmol/L    Chloride 102 97 - 108 mmol/L    CO2 27 21 - 32 mmol/L    Anion gap 9 5 - 15 mmol/L    Glucose 138 (H) 65 - 100 mg/dL    BUN 7 6 - 20 MG/DL    Creatinine 0.85 0.55 - 1.02 MG/DL    BUN/Creatinine ratio 8 (L) 12 - 20      GFR est AA >60 >60 ml/min/1.73m2    GFR est non-AA >60 >60 ml/min/1.73m2    Calcium 9.4 8.5 - 10.1 MG/DL    Bilirubin, total 0.3 0.2 - 1.0 MG/DL    ALT (SGPT) 19 12 - 78 U/L    AST (SGOT) 17 15 - 37 U/L    Alk. phosphatase 105 45 - 117 U/L    Protein, total 8.0 6.4 - 8.2 g/dL    Albumin 3.7 3.5 - 5.0 g/dL    Globulin 4.3 (H) 2.0 - 4.0 g/dL    A-G Ratio 0.9 (L) 1.1 - 2.2     TROPONIN I    Collection Time: 04/19/18  3:58 PM   Result Value Ref Range    Troponin-I, Qt. <0.04 <0.05 ng/mL   LIPASE    Collection Time: 04/19/18  3:58 PM   Result Value Ref Range    Lipase 112 73 - 393 U/L   CK W/ CKMB & INDEX    Collection Time: 04/19/18  3:58 PM   Result Value Ref Range    CK 43 26 - 192 U/L    CK - MB <1.0 <3.6 NG/ML    CK-MB Index Cannot be calculated 0 - 2.5     NT-PRO BNP    Collection Time: 04/19/18  3:58 PM   Result Value Ref Range    NT pro- (H) 0 - 125 PG/ML       Xr Chest Pa Lat    Result Date: 4/19/2018  INDICATION: . chest pain COMPARISON: Previous chest xray, 11/15/2015. Blade Smart FINDINGS: PA and lateral view of the chest. . Lines/tubes/surgical: None. Heart/mediastinum: There is a stent projecting over the origin of the left subclavian artery. Lungs/pleura:  No focal consolidation or mass. No visualized pleural effusion or pneumothorax. Additional Comments: Degenerative changes in the spine. .    IMPRESSION: 1. No radiographic evidence of acute cardiopulmonary disease.

## 2018-04-19 NOTE — TELEPHONE ENCOUNTER
Daughter called to let office know she was taking her mother to [facility] ER due to having chest pain for several day.

## 2018-04-19 NOTE — Clinical Note
Thank you for allowing us to provide you with medical care today. We realize that you have many choices for your emergency care needs. We thank you for choosing [facility]. Please choose us in the future for any continued health care needs. We hope we addressed all of your medical concerns. We strive to provide excellent quality care in the Emergency Department. Anything less than excellent does not meet our expectations. The exam and treatment you received in the Emergency Department  were for an emergent problem and are not intended as complete care. It is important that you follow up with a doctor, nurse practitioner, or [address] [place] [address] assistant for ongoing care. If your symptoms worsen or you do not improve as expected and you are un able to reach your usual health care provider, you should return to the Emergency Department. We are available 24 hours a day. Take this sheet with you when you go to your follow-up visit. If you have any problem arranging the follow-up visit, co ntact the Emergency Department immediately. Make an appointment your family doctor for follow up of this visit. Return to the ER if you are unable to be seen in a timely manner.

## 2018-04-19 NOTE — ED PROVIDER NOTES
HPI Comments: 'cp x 3d/ L arm numbness/ 'I have an appt with my cardiologist but I cant wait'; pt denies HA, vison changes, diff swallowing,  SOB, Abd pain, F/Ch, N/V, D/Cons or other current systemic complaints    (+) still smoking    Patient is a 62 y.o. female presenting with chest pain. The history is provided by the patient and a relative. Chest Pain (Angina)    This is a new problem. The current episode started 2 days ago. The problem has not changed since onset. The problem occurs rarely. The pain is associated with normal activity. The pain is present in the substernal region and left side. The pain is moderate. The quality of the pain is described as pressure-like, vice-like, tightness and heavy. The pain radiates to the left arm. The symptoms are aggravated by movement, stress and certain positions. Associated symptoms include malaise/fatigue and numbness. Pertinent negatives include no abdominal pain, no back pain, no claudication, no cough, no diaphoresis, no dizziness, no exertional chest pressure, no fever, no headaches, no hemoptysis, no irregular heartbeat, no leg pain, no lower extremity edema, no nausea, no near-syncope, no orthopnea, no palpitations, no PND, no shortness of breath, no sputum production, no vomiting and no weakness. She has tried rest and NSAIDs for the symptoms. The treatment provided no relief. Risk factors include smoking/tobacco exposure, family history and cardiac disease. Her past medical history is significant for DM and HTN. Past medical history comments: asthma, Smoker, . Procedural history includes cardiac catheterization and cardiac stents.        Past Medical History:   Diagnosis Date    Aneurysm ([facility] [address]     cerebral    Anxiety     Asthma     Depression     Diabetes ([facility] [address]     Headache(784.0)     Hypercholesterolemia     Hypertension     Other ill-defined conditions(799.89)     high cholesterol    Other ill-defined conditions(799.89)     pvd    Stroke ([facility] [address] 2006 no residual       Past Surgical History:   Procedure Laterality Date    ANEURYSM, INTRACRAN, SIMPLE SURG      HX GYN      HX OTHER SURGICAL      excision cyst/bilateral axilla         Family History:   Problem Relation Age of Onset    Hypertension Mother     Cancer Father     Breast Cancer Maternal Aunt     Breast Cancer Maternal Aunt        Social History     Social History    Marital status: SINGLE     Spouse name: N/A    Number of children: N/A    Years of education: N/A     Occupational History    Not on file. Social History Main Topics    Smoking status: Current Every Day Smoker     Packs/day: 1.00     Years: 35.00    Smokeless tobacco: Never Used    Alcohol use Yes      Comment: occasional    Drug use: No    Sexual activity: Not on file     Other Topics Concern    Not on file     Social History Narrative         ALLERGIES: Ciprofloxacin    Review of Systems   Constitutional: Positive for malaise/fatigue. Negative for chills, diaphoresis and fever. HENT: Negative for trouble swallowing and voice change. Eyes: Negative for visual disturbance. Respiratory: Positive for chest tightness. Negative for cough, hemoptysis, sputum production, choking and shortness of breath. Cardiovascular: Positive for chest pain. Negative for palpitations, orthopnea, claudication, PND and near-syncope. Gastrointestinal: Negative for abdominal pain, diarrhea, nausea and vomiting. Genitourinary: Negative for dysuria. Musculoskeletal: Negative for back pain. Neurological: Positive for numbness. Negative for dizziness, weakness and headaches. All other systems reviewed and are negative. Vitals:    04/19/18 1504 04/19/18 1826   BP: 182/88 125/59   Pulse: 97 (!) 52   Resp: 18 16   Temp: 97.7 °F (36.5 °C)    SpO2: 98% 97%   Weight: 81.6 kg (180 lb)    Height: 5' 2\" (1.575 m)             Physical Exam   Constitutional: She is oriented to person, place, and time.  She appears well-developed and well-nourished. No distress. HENT:   Head: Normocephalic and atraumatic. Nose: Nose normal.   Mouth/Throat: Oropharynx is clear and moist.   Eyes: Conjunctivae and EOM are normal. Pupils are equal, round, and reactive to light. Right eye exhibits no discharge. Left eye exhibits no discharge. No scleral icterus. Neck: Normal range of motion. Neck supple. No JVD present. No tracheal deviation present. Cardiovascular: Normal rate, regular rhythm, normal heart sounds and intact distal pulses. Exam reveals no gallop and no friction rub. No murmur heard. Pulmonary/Chest: Effort normal and breath sounds normal. No respiratory distress. She has no wheezes. She has no rales. She exhibits no tenderness. Abdominal: Soft. Bowel sounds are normal. There is no tenderness. There is no rebound and no guarding. nttp     Genitourinary: No vaginal discharge found. Genitourinary Comments: Pt denies urinary/ vaginal complaints   Musculoskeletal: Normal range of motion. She exhibits no edema or tenderness. Neurological: She is alert and oriented to person, place, and time. She has normal reflexes. No cranial nerve deficit. She exhibits normal muscle tone. Coordination normal.   pt has motor/ CV/ Sensation grossly intact to all extremities, R = L in strength;   Skin: Skin is warm and dry. No rash noted. No erythema. No pallor. Psychiatric: She has a normal mood and affect. Her behavior is normal. Thought content normal.   Nursing note and vitals reviewed. [facility]      ED Course       Procedures    Chief Complaint   Patient presents with    Chest Pain       3:13 PM  The patients presenting problems have been discussed, and they are in agreement with the care plan formulated and outlined with them. I have encouraged them to ask questions as they arise throughout their visit.     MEDICATIONS GIVEN:  Medications   aspirin chewable tablet 324 mg (not administered)       LABS REVIEWED:  Labs Reviewed   CBC WITH AUTOMATED DIFF   METABOLIC PANEL, COMPREHENSIVE   TROPONIN I   SAMPLES BEING HELD   LIPASE   CK W/ CKMB & INDEX   NT-PRO BNP       RADIOLOGY RESULTS:  The following have been ordered and reviewed:  _____________________________________________________________________  _____________________________________________________________________    EKG interpretation:   Rhythm: normal sinus rhythm; and regular . Rate (approx.): 92; Axis: normal; P wave: normal; QRS interval: normal ; ST/T wave: normal; Negative acute significant segmental elevations/ noted lateral T wave inversions V5/6 new since study dated 03/20/2018    PROCEDURES:        CONSULTATIONS:       PROGRESS NOTES:      DIAGNOSIS:    1. Chest pain, unspecified type    2. Smoking        PLAN:  1- Chest Pain / neg ed evaluation - will have pt follow-up with Cardiology;   2 smoking - Pt advised to stop 'or it will kill you'      ED COURSE: The patients hospital course has been uncomplicated. 4:48 PM  Pt told of results/ pending cardiology consult/ agrees w/ plans    CONSULT  NOTE  5:14 PM  Yoana Rankin MD spoke with Dr Florence Kawasaki. Specialty: Cardiology  Discussed pt's hx, disposition, and available diagnostic and imaging results. Reviewed care plans. Consulting physician agrees with plans as outlined 'Please give her nitro/ a GI cocktail/ clean cath 2015/ and let me know how she does. If she does better I will stress her later this week'. Pt told of plans/ agrees;   Written by Yoana Rankin MD, ED Scribe as dictated by Yoana Rankin MD.    6:09 PM 'better I guess/ have an appt tomorrow w/ Dr Kely Castellano'; Ariana Ramirez  results have been reviewed with her. She has been counseled regarding her diagnosis. She verbally conveys understanding and agreement of the signs, symptoms, diagnosis, treatment and prognosis and additionally agrees to Call/ Arrange follow up as recommended with Dr. Sue Bright DO in 24 - 48 hours.   She also agrees with the care-plan and conveys that all of her questions have been answered. I have also put together some discharge instructions for her that include: 1) educational information regarding their diagnosis, 2) how to care for their diagnosis at home, as well a 3) list of reasons why they would want to return to the ED prior to their follow-up appointment, should their condition change or for concerns.

## 2018-04-20 ENCOUNTER — OFFICE VISIT (OUTPATIENT)
Dept: CARDIOLOGY CLINIC | Age: 58
End: 2018-04-20

## 2018-04-20 VITALS
HEIGHT: 62 IN | BODY MASS INDEX: 32.72 KG/M2 | SYSTOLIC BLOOD PRESSURE: 160 MMHG | WEIGHT: 177.8 LBS | DIASTOLIC BLOOD PRESSURE: 80 MMHG | HEART RATE: 88 BPM

## 2018-04-20 DIAGNOSIS — R07.9 CHEST PAIN, UNSPECIFIED TYPE: ICD-10-CM

## 2018-04-20 DIAGNOSIS — E78.00 HYPERCHOLESTEREMIA: ICD-10-CM

## 2018-04-20 DIAGNOSIS — I10 ESSENTIAL HYPERTENSION: Primary | ICD-10-CM

## 2018-04-20 DIAGNOSIS — R06.02 SOB (SHORTNESS OF BREATH): ICD-10-CM

## 2018-04-20 RX ORDER — NITROGLYCERIN 0.4 MG/1
0.4 TABLET SUBLINGUAL
Qty: 1 BOTTLE | Refills: 5 | Status: SHIPPED | OUTPATIENT
Start: 2018-04-20 | End: 2021-07-06 | Stop reason: SDUPTHER

## 2018-04-20 RX ORDER — ROSUVASTATIN CALCIUM 20 MG/1
20 TABLET, COATED ORAL
Qty: 30 TAB | Refills: 5 | Status: SHIPPED | OUTPATIENT
Start: 2018-04-20 | End: 2018-10-25 | Stop reason: SDUPTHER

## 2018-04-20 NOTE — PROGRESS NOTES
Visit Vitals    /80    Pulse 88    Ht 5' 2\" (1.575 m)    Wt 177 lb 12.8 oz (80.6 kg)    BMI 32.52 kg/m2     '  Visit Vitals    /80    Pulse 88    Ht 5' 2\" (1.575 m)    Wt 177 lb 12.8 oz (80.6 kg)    BMI 32.52 kg/m2     Medication changes for this OV VO Dr Teodoro Amaya

## 2018-04-20 NOTE — MR AVS SNAPSHOT
[address] [address] 
[phone] Patient: Syeda Guzman MRN: [id number] :1960 Visit Information Date & Time Provider Department Dept. Phone Encounter #  
 2018  1:20 PM Tisha Sauer MD [facility] [place] [phone] [id number] Your Appointments 2018  2:00 PM  
New Patient with Dinah Habermann, MD  
[facility] ([facility]) Appt Note: ref by Dr Jane Gamez Dx Afib sll [address] [place] [address] [place] [id number]  
[phone]  
  
   
 [address] [facility] [address]  
  
    
 2018  9:00 AM  
VASCULAR TEST with VASCULAR, STFRANCIS  
[facility] ([facility]) Appt Note: 1 day lexiscan Ht 5'2 Wt 177 carotids at 9am Dr Anshul Hsu [address] [place] [address] [address] [facility]  
[phone]  
  
   
 [address] [facility] [address] [address] [facility] [address]  
  
    
 2018 10:00 AM  
NUCLEAR MEDICINE with NUCLEAR, [facility]  
[facility] ([facility]) Appt Note: 1 day lexiscan Ht 5'2 Wt 177 carotids at 9am Dr Anshul Hsu [address] [place] [address] [facility]  
[phone]  
  
   
 [address] [facility] [place] [address] [facility] [address]  
  
    
 10/19/2018  1:00 PM  
ESTABLISHED PATIENT with Tisha Sauer MD  
[facility] ([facility]) Appt Note: 6 mo fu appt  [address] [address]  
[phone]  
  
   
 [address] [address] [address] [address] Upcoming Health Maintenance Date Due  
 PAP AKA CERVICAL CYTOLOGY 1981 MEDICARE YEARLY EXAM 3/14/2018 HEMOGLOBIN A1C Q6M 2018 BREAST CANCER SCRN MAMMOGRAM 2018 LIPID PANEL Q1 2019 COLONOSCOPY 2024 DTaP/Tdap/Td series (2 - Td) 3/10/2025 Allergies as of 4/20/2018  Review Complete On: 4/20/2018 By: Kenan West LPN Severity Noted Reaction Type Reactions Ciprofloxacin  05/26/2011    Nausea Only, Vertigo Current Immunizations  Reviewed on 11/14/2017 Name Date Influenza Vaccine (Quad) PF 11/14/2017, 10/19/2016, 11/20/2015 Pneumococcal Polysaccharide (PPSV-23) 11/14/2017 Tdap 3/10/2015 Not reviewed this visit You Were Diagnosed With   
  
 Codes Comments Essential hypertension    -  Primary ICD-10-CM: I10 
ICD-9-CM: 401.9 Hypercholesteremia     ICD-10-CM: E78.00 ICD-9-CM: 272.0   
 SOB (shortness of breath)     ICD-10-CM: R06.02 
ICD-9-CM: 786.05 Chest pain, unspecified type     ICD-10-CM: R07.9 ICD-9-CM: 786.50 Vitals BP Pulse Height(growth percentile) Weight(growth percentile) BMI OB Status 160/80 88 5' 2\" (1.575 m) 177 lb 12.8 oz (80.6 kg) 32.52 kg/m2 Postmenopausal  
 Smoking Status Current Every Day Smoker Vitals History BMI and BSA Data Body Mass Index Body Surface Area 32.52 kg/m 2 1.88 m 2 Preferred Pharmacy Pharmacy Name Phone [facility] [phone] Your Updated Medication List  
  
   
This list is accurate as of 4/20/18  2:06 PM.  Always use your most recent med list.  
  
  
  
  
 Alexander Silence 250-50 mcg/dose diskus inhaler Generic drug:  fluticasone-salmeterol INHALE 1 PUFF BY MOUTH TWICE DAILY  
  
 albuterol 2.5 mg /3 mL (0.083 %) nebulizer solution Commonly known as:  PROVENTIL VENTOLIN  
USE 1 VIAL VIA NEBULIZER Q 4 H PRF WHEEZING  
  
 ALEVE 220 mg Cap Generic drug:  naproxen sodium Take  by mouth daily. Indications: for pain  
  
 aspirin delayed-release 81 mg tablet Take  by mouth daily. benzonatate 100 mg capsule Commonly known as:  TESSALON  
TK ONE C PO  TID PRN COUGH  
  
 Blood-Glucose Meter monitoring kit Use as directed. Dispense 1 meter kit. 0 refills. Check twice a day, before each insulin dose. CARDIZEM  mg ER capsule Generic drug:  dilTIAZem CD Take  by mouth daily. gabapentin 300 mg capsule Commonly known as:  NEURONTIN  
TAKE 1 CAPSULE BY MOUTH THREE TIMES DAILY  
  
 insulin aspart U-100 100 unit/mL Inpn Commonly known as:  Eligio Dad 10 units QAC SubQ, dis 1 package Insulin Needles (Disposable) 31 gauge x 5/16\" Ndle For application of insulin. Insulin Syringes (Disposable) 1 mL Syrg 1 Syringe by Does Not Apply route two (2) times a day. Before breakfast and 12 hours later. Lancets Misc For use with glucose monitor. lisinopril 5 mg tablet Commonly known as:  PRINIVIL, ZESTRIL  
TAKE 1 TABLET BY MOUTH EVERY DAY  
  
 mupirocin 2 % ointment Commonly known as:  [employer] Apply  to affected area daily. nitroglycerin 0.4 mg SL tablet Commonly known as:  NITROSTAT  
1 Tab by SubLINGual route every five (5) minutes as needed for Chest Pain. ondansetron 8 mg disintegrating tablet Commonly known as:  ZOFRAN ODT Take 1 Tab by mouth every eight (8) hours as needed for Nausea. rosuvastatin 20 mg tablet Commonly known as:  CRESTOR Take 1 Tab by mouth nightly. SITagliptin 100 mg tablet Commonly known as:  Noreene Sly TAKE 1 TABLET BY MOUTH DAILY  
  
 umeclidinium 62.5 mcg/actuation inhaler Commonly known as:  INCRUSE ELLIPTA Take 1 Puff by inhalation daily. XARELTO 20 mg Tab tablet Generic drug:  rivaroxaban TAKE 1 TABLET BY MOUTH EVERY DAY WITH DINNER Prescriptions Sent to Pharmacy Refills  
 nitroglycerin (NITROSTAT) 0.4 mg SL tablet 5 Si Tab by SubLINGual route every five (5) minutes as needed for Chest Pain.   
 Class: Normal  
 Pharmacy: [facility] [address] [facility] Ph #: [phone] Route: SubLINGual  
 rosuvastatin (CRESTOR) 20 mg tablet 5 Sig: Take 1 Tab by mouth nightly. Class: Normal  
 Pharmacy: [facility] [address] [facility] [place] Ph #: [phone] Route: Oral  
  
We Performed the Following HEPATIC FUNCTION PANEL [07812 CPT(R)] LIPID PANEL [33589 CPT(R)] To-Do List   
 05/11/2018 Imaging:  DUPLEX CAROTID BILATERAL   
  
 05/11/2018 ECG:  STRESS TEST CARDI[facility] Introducing \[facility] [place] [facility]\"" & [facility] SERVICES! Dear Mikey Jha: Thank you for requesting a [employer] account. Our records indicate that you already have an active [employer] account. You can access your account anytime at [email or web address] Did you know that you can access your hospital and ER discharge instructions at any time in [employer]? You can also review all of your test results from your hospital stay or ER visit. Additional Information If you have questions, please visit the Frequently Asked Questions section of the [employer] website at [email or web address]/. Remember, [employer] is NOT to be used for urgent needs. For medical emergencies, dial 911. Now available from your iPhone and Android! Please provide this summary of care documentation to your next provider. Your primary care clinician is listed as Barbara Correa. If you have any questions after today's visit, please call [phone].

## 2018-04-20 NOTE — PROGRESS NOTES
LAST OFFICE VISIT : 3/30/2018        ICD-10-CM ICD-9-CM   1. Essential hypertension I10 401.9   2. Hypercholesteremia E78.00 272.0   3. SOB (shortness of breath) R06.02 786.05   4. Chest pain, unspecified type R07.9 786.50            Fitz Billings is a 62 y.o. female with diabetes, hypertension and dyslipidemia referred for 3 week follow up. Cardiac risk factors: smoking/ tobacco exposure, dyslipidemia, diabetes mellitus, obesity, sedentary life style, hypertension, post-menopausal  I have personally obtained the history from the patient. HISTORY OF PRESENTING ILLNESS     The pt has been to the ED twice since her last office visit. She was there for constipation and chest pain. Her troponin's were negative yesterday. The pt states that she was given nitro and this did not alleviate her pain. She reports that this chest pain is still there and radiates down her left arm. The pt states that she is still smoking about a pack of cigarettes a day. She reports that for the last 5 days she hasn't done anything besides watch TV. Prior to this she states that she was able to be active unless it caused her pain. The pt states that her arm hurts just when she touches it. The patient denies shortness of breath, orthopnea, PND, LE edema, palpitations, syncope, presyncope or fatigue.          ACTIVE PROBLEM LIST     Patient Active Problem List    Diagnosis Date Noted    Coronary artery disease involving native coronary artery of native heart without angina pectoris 03/20/2018    Type 2 diabetes mellitus with hyperglycemia ([address] 11/20/2015    Microalbuminuria 11/20/2015    Obesity (BMI 30-39.9) 11/15/2015    PVD (peripheral vascular disease) ([address] 11/15/2015    Hyperglycemia due to type 2 diabetes mellitus ([address] 11/15/2015    Tobacco abuse 11/15/2015    Diabetic neuropathy ([address] 03/10/2015    Migraine 03/10/2015    Hypercholesteremia 03/10/2015    Sebaceous cyst 03/08/2011    COPD (chronic obstructive pulmonary disease) ([facility] 01/17/2011    Depression 01/17/2011    DM (diabetes mellitus) ([facility] 01/03/2011    HTN (hypertension) 01/03/2011           PAST MEDICAL HISTORY     Past Medical History:   Diagnosis Date    Aneurysm ([facility]     cerebral    Anxiety     Asthma     Depression     Diabetes ([facility]     Headache(784.0)     Hypercholesterolemia     Hypertension     Other ill-defined conditions(799.89)     high cholesterol    Other ill-defined conditions(799.89)     pvd    Stroke ([facility] 2006    no residual           PAST SURGICAL HISTORY     Past Surgical History:   Procedure Laterality Date    ANEURYSM, INTRACRAN, SIMPLE SURG      HX GYN      HX OTHER SURGICAL      excision cyst/bilateral axilla          ALLERGIES     Allergies   Allergen Reactions    Ciprofloxacin Nausea Only and Vertigo          FAMILY HISTORY     Family History   Problem Relation Age of Onset    Hypertension Mother     Cancer Father     Breast Cancer Maternal Aunt     Breast Cancer Maternal Aunt     negative for cardiac disease       SOCIAL HISTORY     Social History     Social History    Marital status: SINGLE     Spouse name: N/A    Number of children: N/A    Years of education: N/A     Social History Main Topics    Smoking status: Current Every Day Smoker     Packs/day: 1.00     Years: 35.00    Smokeless tobacco: Never Used    Alcohol use Yes      Comment: occasional    Drug use: No    Sexual activity: Not Asked     Other Topics Concern    None     Social History Narrative         MEDICATIONS     Current Outpatient Prescriptions   Medication Sig    XARELTO 20 mg tab tablet TAKE 1 TABLET BY MOUTH EVERY DAY WITH DINNER    dilTIAZem CD (CARDIZEM CD) 240 mg ER capsule Take  by mouth daily.     albuterol (PROVENTIL VENTOLIN) 2.5 mg /3 mL (0.083 %) nebulizer solution USE 1 VIAL VIA NEBULIZER Q 4 H PRF WHEEZING    benzonatate (TESSALON) 100 mg capsule TK ONE C PO  TID PRN COUGH    ondansetron (ZOFRAN ODT) 8 mg disintegrating tablet Take 1 Tab by mouth every eight (8) hours as needed for Nausea.  mupirocin (BACTROBAN) 2 % ointment Apply  to affected area daily.  lisinopril (PRINIVIL, ZESTRIL) 5 mg tablet TAKE 1 TABLET BY MOUTH EVERY DAY    SITagliptin (JANUVIA) 100 mg tablet TAKE 1 TABLET BY MOUTH DAILY    ADVAIR DISKUS 250-50 mcg/dose diskus inhaler INHALE 1 PUFF BY MOUTH TWICE DAILY    aspirin delayed-release 81 mg tablet Take  by mouth daily.  naproxen sodium (ALEVE) 220 mg cap Take  by mouth daily. Indications: for pain    umeclidinium (INCRUSE ELLIPTA) 62.5 mcg/actuation inhaler Take 1 Puff by inhalation daily.  insulin aspart (NOVOLOG) 100 unit/mL inpn 10 units QAC SubQ, dis 1 package    gabapentin (NEURONTIN) 300 mg capsule TAKE 1 CAPSULE BY MOUTH THREE TIMES DAILY    Insulin Needles, Disposable, 31 gauge x 3/32\" ndle For application of insulin.  Blood-Glucose Meter monitoring kit Use as directed. Dispense 1 meter kit. 0 refills. Check twice a day, before each insulin dose.  Insulin Syringes, Disposable, 1 mL syrg 1 Syringe by Does Not Apply route two (2) times a day. Before breakfast and 12 hours later.  Lancets misc For use with glucose monitor.  simvastatin (ZOCOR) 40 mg tablet TAKE 1 TABLET BY MOUTH EVERY EVENING (Patient taking differently: Take 40 mg by mouth daily. TAKE 1 TABLET BY MOUTH EVERY EVENING)     No current facility-administered medications for this visit. I have reviewed the nurses notes, vitals, problem list, allergy list, medical history, family, social history and medications. REVIEW OF SYMPTOMS      General: Pt denies excessive weight gain or loss. Pt is able to conduct ADL's  HEENT: Denies blurred vision, headaches, hearing loss, epistaxis and difficulty swallowing. Respiratory: Denies cough, congestion, shortness of breath, FLORES, wheezing or stridor.   Cardiovascular: Denies precordial pain, palpitations, edema or PND  Gastrointestinal: Denies poor appetite, indigestion, abdominal pain or blood in stool  Genitourinary: Denies hematuria, dysuria, increased urinary frequency  Musculoskeletal: Denies joint pain or swelling from muscles or joints  Neurologic: Denies tremor, paresthesias, headache, or sensory motor disturbance  Psychiatric: Denies confusion, insomnia, depression  Integumentray: Denies rash, itching or ulcers. Hematologic: Denies easy bruising, bleeding     PHYSICAL EXAMINATION      Vitals:    04/20/18 1331   BP: 160/80   Pulse: 88   Weight: 177 lb 12.8 oz (80.6 kg)   Height: 5' 2\" (1.575 m)     General: Well developed, in no acute distress. HEENT: No jaundice, oral mucosa moist, no oral ulcers  Neck: Supple, no stiffness, no lymphadenopathy, supple  Heart:  Normal S1/S2 negative S3 or S4. Regular, no murmur, gallop or rub, no jugular venous distention  Respiratory: Clear bilaterally x 4, no wheezing or rales  Extremities:  No edema, normal cap refill, no cyanosis. Musculoskeletal: No clubbing, no deformities  Neuro: A&Ox3, speech clear, gait stable, cooperative, no focal neurologic deficits  Skin: Skin color is normal. No rashes or lesions. Non diaphoretic, moist.       DIAGNOSTIC DATA     1. Echo  8/27/15- EF 65%  (2/22/18) LVEF 45% grade 1 DD. LA mild dilated. MV mild regurg. AV leaflets sclerosed with mild regurg. 2. Cath  8/27/15-  EF 60 %., LAD luminal irreg. 20%, LCX minor luminal irreg. RCA ok, PDA 30 and 40%    Left leg disease seen on the abdominal aortic injeciton up to 80%. Right subclavian: There was a 95 % stenosis. 3. Lipids  4/20/16- , HDL 26, LDL 86, Tg 167  (2/19/18) , HDL 32, LDL 88,     4. Carotid Doppler  8/27/15- 10-49% L/R, subclavian steal syndrome indicated due to reversal of vertebral artery flow.          LABORATORY DATA            Lab Results   Component Value Date/Time    WBC 10.0 04/19/2018 03:58 PM    HGB 13.8 04/19/2018 03:58 PM    HCT 42.7 04/19/2018 03:58 PM    PLATELET 144 32/16/5264 03:58 PM    MCV 92.2 04/19/2018 03:58 PM      Lab Results   Component Value Date/Time    Sodium 138 04/19/2018 03:58 PM    Potassium 4.1 04/19/2018 03:58 PM    Chloride 102 04/19/2018 03:58 PM    CO2 27 04/19/2018 03:58 PM    Anion gap 9 04/19/2018 03:58 PM    Glucose 138 (H) 04/19/2018 03:58 PM    BUN 7 04/19/2018 03:58 PM    Creatinine 0.85 04/19/2018 03:58 PM    BUN/Creatinine ratio 8 (L) 04/19/2018 03:58 PM    GFR est AA >60 04/19/2018 03:58 PM    GFR est non-AA >60 04/19/2018 03:58 PM    Calcium 9.4 04/19/2018 03:58 PM    Bilirubin, total 0.3 04/19/2018 03:58 PM    AST (SGOT) 17 04/19/2018 03:58 PM    Alk. phosphatase 105 04/19/2018 03:58 PM    Protein, total 8.0 04/19/2018 03:58 PM    Albumin 3.7 04/19/2018 03:58 PM    Globulin 4.3 (H) 04/19/2018 03:58 PM    A-G Ratio 0.9 (L) 04/19/2018 03:58 PM    ALT (SGPT) 19 04/19/2018 03:58 PM           ASSESSMENT/RECOMMENDATIONS:.      1. Atrial fibrillation/SVT   - She hasn't had any further episodes of AF, she remains on xarelto and is not complaining of any bleeding issues today. 2. Subclavian stenting ballooned open 7/31/17  3. CAD/Chest pain  - She has minimal CAD on her cath in 2015, I will go ahead and do a lexiscan cardiolite. Her chest discomfort sounds more non cardiac as her arm hurts just to touch it which is rather peculiar. I will ask that she stop smoking, I reviewed with her a number of issues and how important it is for her to stop as her relative risk of developing a heart attack is high and she knows this as does her daughter. - I have given a prescription for nitro should her pain worsen and she should call us if she uses this. 4. Shortness of breath  - Most likely pulmonary issues but will evaluate this her stress test.   5. Claudication  - Being followed by Dr. Chinedu Vasquez   6. Diabetes mellitus  - Last HGB A1C was 6.3%, goal should be 6% or below.    7. Tobacco dependency   - She is still smoking about a pack a day, I had a long discussion with her about the need to quit smoking. 8. Hypertension  - Blood pressure is under good control. 9. Dyslipidemia  - Lipids are close to goal, I would prefer her LDL be closer to 70 with her underlying diabetes and mild CAD. I am going to switch her to Crestor 20 mg, should she not be able to pay for this I will switch her to lipitor 40mg   10. Return in 6 months or PRN. No orders of the defined types were placed in this encounter. Follow-up Disposition: Not on File      I have discussed the diagnosis with  Kelsie Espana and the intended plan as seen in the above orders. Questions were answered concerning future plans. I have discussed medication side effects and warnings with the patient as well. Thank you,  Jean Howe DO for involving me in the care of  Kelsie Espana. Please do not hesitate to contact me for further questions/concerns. Written by Davi Benton, as dictated by Monika Brooks MD.     Radha Cuello MD, [facility]    Patient Care Team:  Jean Howe DO as PCP - General (Family Practice)  Americo Loo MD (Cardiology)  Leydi Lerma RN as Ambulatory Care Navigator [address]      [phone] / [phone] Fax       Addendum:    7/12/18: I called Mrs. friend's to review her stress test report with her. She had a very small defect in the inferior wall. She states she is not having any further chest discomfort but does continue to smoke. I once again reviewed with her the importance of tobacco cessation. She knows the potential causes and worsening of heart disease should she continue to do this. I instructed her should she have any further chest discomfort to inform me and then we may consider cardiac catheterization at that point.     Monika Brooks MD

## 2018-04-21 LAB
ATRIAL RATE: 92 BPM
CALCULATED P AXIS, ECG09: 57 DEGREES
CALCULATED R AXIS, ECG10: 27 DEGREES
CALCULATED T AXIS, ECG11: 72 DEGREES
DIAGNOSIS, 93000: NORMAL
P-R INTERVAL, ECG05: 162 MS
Q-T INTERVAL, ECG07: 384 MS
QRS DURATION, ECG06: 90 MS
QTC CALCULATION (BEZET), ECG08: 474 MS
VENTRICULAR RATE, ECG03: 92 BPM

## 2018-04-23 ENCOUNTER — PATIENT OUTREACH (OUTPATIENT)
Dept: FAMILY MEDICINE CLINIC | Age: 58
End: 2018-04-23

## 2018-04-24 RX ORDER — DILTIAZEM HYDROCHLORIDE 240 MG/1
240 CAPSULE, COATED, EXTENDED RELEASE ORAL DAILY
Qty: 30 CAP | Refills: 6 | Status: SHIPPED | OUTPATIENT
Start: 2018-04-24 | End: 2018-11-26 | Stop reason: SDUPTHER

## 2018-04-25 ENCOUNTER — OFFICE VISIT (OUTPATIENT)
Dept: FAMILY MEDICINE CLINIC | Age: 58
End: 2018-04-25

## 2018-04-25 ENCOUNTER — HOSPITAL ENCOUNTER (OUTPATIENT)
Dept: LAB | Age: 58
Discharge: HOME | End: 2018-04-25
Payer: MEDICARE

## 2018-04-25 VITALS
OXYGEN SATURATION: 95 % | SYSTOLIC BLOOD PRESSURE: 158 MMHG | HEIGHT: 62 IN | HEART RATE: 98 BPM | RESPIRATION RATE: 19 BRPM | TEMPERATURE: 98.1 F | WEIGHT: 177 LBS | DIASTOLIC BLOOD PRESSURE: 90 MMHG | BODY MASS INDEX: 32.57 KG/M2

## 2018-04-25 DIAGNOSIS — Z87.891 PERSONAL HISTORY OF TOBACCO USE, PRESENTING HAZARDS TO HEALTH: ICD-10-CM

## 2018-04-25 DIAGNOSIS — I73.9 PVD (PERIPHERAL VASCULAR DISEASE) (HCC): ICD-10-CM

## 2018-04-25 DIAGNOSIS — Z79.4 TYPE 2 DIABETES MELLITUS WITH HYPERGLYCEMIA, WITH LONG-TERM CURRENT USE OF INSULIN (HCC): Primary | ICD-10-CM

## 2018-04-25 DIAGNOSIS — E11.65 TYPE 2 DIABETES MELLITUS WITH HYPERGLYCEMIA, WITH LONG-TERM CURRENT USE OF INSULIN (HCC): Primary | ICD-10-CM

## 2018-04-25 PROCEDURE — 83036 HEMOGLOBIN GLYCOSYLATED A1C: CPT

## 2018-04-25 NOTE — LETTER
2018 10:17 AM 
 
Ms. Catalina Dahl [facility] [id number] Dear Catalina Noel: 
 
Please find your most recent results below. Resulted Orders FUNDUS PHOTOGRAPHY Result Value Ref Range SEVERITY ALERT (A) Right eye diabetic retinopathy None Right eye macular edema None Right eye other retinopathy Suspected Other Disease (A) Right eye image quality Gradable Image Left eye diabetic retinopathy None Left eye macular edema None Left eye other retinopathy None Left eye image quality Gradable Image Result Retinal Study Result for Codey Patel Result Result andrea Somers 63 y/o, F (: 21-, MRN: [id number]) Result    
  presented to [address] [place] [address] on 2018 for a retinal  
imaging study of the left and right eyes. Result Result Based on the findings of the study, the following is recommended for HOSP ARMANI FARRIS Result Suspected Other Condition Found (Details in Provider's Comments): Schedule an 
 appointment with a retina specialist for further evaluation within 3 months. Result Result Interpreting Provider's Comments:  Possible hollenhorst plaque versus  
reflection artifact along superonasal arteriole in right eye. Recommend  
dilated fundus exam within 2 weeks. Result Diagnoses Present: E11.65 - Type 2 diabetes mellitus with hyperglycemia Result Result Right Eye Findings:   
 Result Negative for Diabetic Retinopathy. Result Other: Suspected Other Disease Result Result Left Eye Findings:   
 Result Normal Result. Negative for Diabetic Retinopathy. Result Result Result This result was electronically signed by Ankur Lia MD, NPI:  
[id number]; Taxonomy: [id number] on 2018 09:21:44 RUST time. Result Result NOTE:  Any pathology noted on this diabetic retinal evaluation should be  
confirmed by an appropriate ophthalmic examination. RECOMMENDATIONS: 
  No diabetic eye disease but there is a possible abnormality with your right eye and it is recommended that you be seen by an eye doctor for a dilated eye exam within 2 weeks.  The reading states a possible hollenhorst plaque and if this is so you would need further testing Please call me if you have any questions: [phone] Sincerely, Kathie Cedeno, DO

## 2018-04-25 NOTE — PROGRESS NOTES
Sarah Beth Neves is a 62 y.o. female   Chief Complaint   Patient presents with    Diabetes    Chest Pain     has already been cleared by cardio. x 3 weeks     Arm Pain     x 3 weeks     pt here for diabetes follow up and states her sugars have been in high 100's around 160-180. Last A1C was at 6.3. Pt did have elevated numbers for a while but has gotten somewhat better. Per family member pt does not follow a diabetic diet and does eat sugary drinks but has cut back. Pt has not followed up with Dr Julito Benedict for vascular and needs to make a follow up, will refer back there. Pt diabetes goal is <6%   Pt also with chronic cough and we discussed annual CT lung Ca screening and pt would like to start this. she is a 62y.o. year old female who presents for evalution. Reviewed PmHx, RxHx, FmHx, SocHx, AllgHx and updated and dated in the chart. Review of Systems - negative except as listed above in the HPI    Objective:     Vitals:    04/25/18 1026   BP: 158/90   Pulse: 98   Resp: 19   Temp: 98.1 °F (36.7 °C)   SpO2: 95%   Weight: 177 lb (80.3 kg)   Height: 5' 2\" (1.575 m)       Current Outpatient Prescriptions   Medication Sig    dilTIAZem CD (CARDIZEM CD) 240 mg ER capsule Take 1 Cap by mouth daily.  nitroglycerin (NITROSTAT) 0.4 mg SL tablet 1 Tab by SubLINGual route every five (5) minutes as needed for Chest Pain.  rosuvastatin (CRESTOR) 20 mg tablet Take 1 Tab by mouth nightly.  XARELTO 20 mg tab tablet TAKE 1 TABLET BY MOUTH EVERY DAY WITH DINNER    albuterol (PROVENTIL VENTOLIN) 2.5 mg /3 mL (0.083 %) nebulizer solution USE 1 VIAL VIA NEBULIZER Q 4 H PRF WHEEZING    benzonatate (TESSALON) 100 mg capsule TK ONE C PO  TID PRN COUGH    ondansetron (ZOFRAN ODT) 8 mg disintegrating tablet Take 1 Tab by mouth every eight (8) hours as needed for Nausea.  mupirocin (BACTROBAN) 2 % ointment Apply  to affected area daily.     lisinopril (PRINIVIL, ZESTRIL) 5 mg tablet TAKE 1 TABLET BY MOUTH EVERY DAY  SITagliptin (JANUVIA) 100 mg tablet TAKE 1 TABLET BY MOUTH DAILY    ADVAIR DISKUS 250-50 mcg/dose diskus inhaler INHALE 1 PUFF BY MOUTH TWICE DAILY    aspirin delayed-release 81 mg tablet Take  by mouth daily.  naproxen sodium (ALEVE) 220 mg cap Take  by mouth daily. Indications: for pain    umeclidinium (INCRUSE ELLIPTA) 62.5 mcg/actuation inhaler Take 1 Puff by inhalation daily.  insulin aspart (NOVOLOG) 100 unit/mL inpn 10 units QAC SubQ, dis 1 package    gabapentin (NEURONTIN) 300 mg capsule TAKE 1 CAPSULE BY MOUTH THREE TIMES DAILY    Insulin Needles, Disposable, 31 gauge x 0/49\" ndle For application of insulin.  Blood-Glucose Meter monitoring kit Use as directed. Dispense 1 meter kit. 0 refills. Check twice a day, before each insulin dose.  Insulin Syringes, Disposable, 1 mL syrg 1 Syringe by Does Not Apply route two (2) times a day. Before breakfast and 12 hours later.  Lancets misc For use with glucose monitor. No current facility-administered medications for this visit. Physical Examination: General appearance - alert, well appearing, and in no distress  Mental status - alert, oriented to person, place, and time  Eyes - pupils equal and reactive, extraocular eye movements intact  Chest - clear to auscultation, no wheezes, rales or rhonchi, symmetric air entry  Heart - normal rate, regular rhythm, normal S1, S2, no murmurs, rubs, clicks or gallops      Assessment/ Plan:   Diagnoses and all orders for this visit:    1. Type 2 diabetes mellitus with hyperglycemia, with long-term current use of insulin ([facility])  -     FUNDUS PHOTOGRAPHY  -     HEMOGLOBIN A1C WITH EAG    2. PVD (peripheral vascular disease) ([facility]  -     REFERRAL TO VASCULAR SURGERY    3. Personal history of tobacco use, presenting hazards to health  -     Low Dose CT for Lung Cancer Screening; Future       Follow-up Disposition:  Return if symptoms worsen or fail to improve.     I have discussed the diagnosis with the patient and the intended plan as seen in the above orders. The patient has received an after-visit summary and questions were answered concerning future plans. Pt conveyed understanding of plan. Medication Side Effects and Warnings were discussed with patient      Kuldeep Yo DO       Discussed with patient current guidelines for screening for lung cancer. Current recommendations are to obtain yearly screening LDCT yearly for age 46-80, or until smoke free for 15 years. Patient has 60+ pack year history of cigarette smoking and currently smokes 1 ppd. Discussed with patient risks and benefits of screening, including over-diagnosis, false positive rate, and total radiation exposure. Patient currently exhibits no signs or symptoms suggestive of lung cancer. Discussed with patient importance of compliance with yearly annual lung cancer screenings and willingness to undergo diagnosis and treatment if screening scan is positive. In addition, patient was counseled regarding (remaining smoke free/total smoking cessation).

## 2018-04-25 NOTE — PATIENT INSTRUCTIONS

## 2018-04-25 NOTE — PROGRESS NOTES
Chief Complaint   Patient presents with    Diabetes    Chest Pain     has already been cleared by cardio. x 3 weeks     Arm Pain     x 3 weeks       Chest and arm pain have improved since onset.

## 2018-04-26 ENCOUNTER — TELEPHONE (OUTPATIENT)
Dept: FAMILY MEDICINE CLINIC | Age: 58
End: 2018-04-26

## 2018-04-26 LAB
EST. AVERAGE GLUCOSE BLD GHB EST-MCNC: 174 MG/DL
HBA1C MFR BLD: 7.7 % (ref 4.8–5.6)

## 2018-04-26 NOTE — PROGRESS NOTES
Pt diabetes has worsened, I want to start her on a once daily long acting insulin tresiba 20u daily sub q if agreeable will send in then recheck in 3 months

## 2018-04-26 NOTE — TELEPHONE ENCOUNTER
Two patient Identification confirmed. Per Dr. Wanda Marti to patient in regards to labs. Pt verbalized understanding. Pt requested call back when medication sent to pharmacy.

## 2018-04-26 NOTE — TELEPHONE ENCOUNTER
Please call pt with lab results and mail a copy to pt. She no longer uses [employer] because she lost her info and no longer wants to use it.  Please call pt at [phone]

## 2018-04-26 NOTE — PROGRESS NOTES
Two patient Identification confirmed. Per Dr. Jacinta Goldstein to patient in regards to labs. Pt verbalized understanding. Pt states agrees with starting once daily long acting insulin tresiba 20u daily sub q. Pt states please call back when medication is sent to Pharmacy.

## 2018-04-27 DIAGNOSIS — Z79.4 TYPE 2 DIABETES MELLITUS WITH HYPERGLYCEMIA, WITH LONG-TERM CURRENT USE OF INSULIN (HCC): Primary | ICD-10-CM

## 2018-04-27 DIAGNOSIS — E11.65 TYPE 2 DIABETES MELLITUS WITH HYPERGLYCEMIA, WITH LONG-TERM CURRENT USE OF INSULIN (HCC): Primary | ICD-10-CM

## 2018-04-27 RX ORDER — PEN NEEDLE, DIABETIC 31 GX3/16"
NEEDLE, DISPOSABLE MISCELLANEOUS
Qty: 100 PEN NEEDLE | Refills: 11 | Status: SHIPPED | OUTPATIENT
Start: 2018-04-27 | End: 2019-05-08 | Stop reason: SDUPTHER

## 2018-04-27 RX ORDER — INSULIN DEGLUDEC 100 U/ML
20 INJECTION, SOLUTION SUBCUTANEOUS
Qty: 2 PEN | Refills: 5 | Status: SHIPPED | OUTPATIENT
Start: 2018-04-27 | End: 2018-10-25 | Stop reason: SDUPTHER

## 2018-04-27 NOTE — PROGRESS NOTES
tresiba and pen needles sent to pharm on file.   If there is an issue with coverage for tresiba we can change

## 2018-04-27 NOTE — PROGRESS NOTES
Two patient Identification confirmed. Pt informed medication sent to pharmacy. Pt states concern about taking Novolog and Januvia with her new rx. Please advise.

## 2018-04-27 NOTE — PROGRESS NOTES
Two patient Identification confirmed. Per Dr. Angel Shine pt informed novolog and [place] and [place] with new rx are all fine together. Pt verbalized understanding.

## 2018-04-27 NOTE — PROGRESS NOTES
Two patient Identification confirmed. Per Dr. Tolu Coffey pt informed novolog and [place] and [place] with new rx are all fine together.

## 2018-05-02 ENCOUNTER — OFFICE VISIT (OUTPATIENT)
Dept: CARDIOLOGY CLINIC | Age: 58
End: 2018-05-02

## 2018-05-02 VITALS
HEIGHT: 62 IN | RESPIRATION RATE: 16 BRPM | HEART RATE: 72 BPM | BODY MASS INDEX: 33.09 KG/M2 | SYSTOLIC BLOOD PRESSURE: 148 MMHG | OXYGEN SATURATION: 93 % | DIASTOLIC BLOOD PRESSURE: 72 MMHG | WEIGHT: 179.8 LBS

## 2018-05-02 DIAGNOSIS — I48.91 ATRIAL FIBRILLATION BY ELECTROCARDIOGRAM (HCC): Primary | ICD-10-CM

## 2018-05-02 NOTE — PROGRESS NOTES
Visit Vitals    /72 (BP 1 Location: Left arm, BP Patient Position: Sitting)    Pulse 72    Resp 16    Ht 5' 2\" (1.575 m)    Wt 179 lb 12.8 oz (81.6 kg)    SpO2 93%    BMI 32.89 kg/m2

## 2018-05-02 NOTE — PROGRESS NOTES
HISTORY OF PRESENTING ILLNESS      Jennifer De La Torre is a 62 y.o. female with CAD, DM, PVD, DM, hypercholesteremia, COPD and HTN who was recently diagnosed with AF during an admission for SOB at [facility]. During hospitalization, she was unaware of arrhythmia. She reports episodes of chest pain for which she is scheduled to have stress testing next week (5/9/2018). She is a CHADS VASC 4 and Xarelto was initiated for CVA risk reduction. She is also on diltiazem for rate control. She has been feeling well with this current regimen. The patient denies shortness of breath, orthopnea, PND, LE edema, palpitations, syncope, presyncope or fatigue.           ACTIVE PROBLEM LIST     Patient Active Problem List    Diagnosis Date Noted    Coronary artery disease involving native coronary artery of native heart without angina pectoris 03/20/2018    Type 2 diabetes mellitus with hyperglycemia ([address] 11/20/2015    Microalbuminuria 11/20/2015    Obesity (BMI 30-39.9) 11/15/2015    PVD (peripheral vascular disease) ([address] 11/15/2015    Hyperglycemia due to type 2 diabetes mellitus ([address] 11/15/2015    Tobacco abuse 11/15/2015    Diabetic neuropathy ([address] 03/10/2015    Migraine 03/10/2015    Hypercholesteremia 03/10/2015    Sebaceous cyst 03/08/2011    COPD (chronic obstructive pulmonary disease) ([address] 01/17/2011    Depression 01/17/2011    DM (diabetes mellitus) ([address] 01/03/2011    HTN (hypertension) 01/03/2011           PAST MEDICAL HISTORY     Past Medical History:   Diagnosis Date    Aneurysm ([address]     cerebral    Anxiety     Asthma     Depression     Diabetes ([address]     Headache(784.0)     Hypercholesterolemia     Hypertension     Other ill-defined conditions(799.89)     high cholesterol    Other ill-defined conditions(799.89)     pvd    Stroke ([address] 2006    no residual           PAST SURGICAL HISTORY     Past Surgical History:   Procedure Laterality Date    ANEURYSM, INTRACRAN, SIMPLE SURG  HX GYN      HX OTHER SURGICAL      excision cyst/bilateral axilla          ALLERGIES     Allergies   Allergen Reactions    Ciprofloxacin Nausea Only and Vertigo          FAMILY HISTORY     Family History   Problem Relation Age of Onset    Hypertension Mother     Cancer Father     Breast Cancer Maternal Aunt     Breast Cancer Maternal Aunt     negative for cardiac disease       SOCIAL HISTORY     Social History     Social History    Marital status: SINGLE     Spouse name: N/A    Number of children: N/A    Years of education: N/A     Social History Main Topics    Smoking status: Current Every Day Smoker     Packs/day: 1.00     Years: 35.00    Smokeless tobacco: Never Used    Alcohol use Yes      Comment: occasional    Drug use: No    Sexual activity: Not on file     Other Topics Concern    Not on file     Social History Narrative         MEDICATIONS     Current Outpatient Prescriptions   Medication Sig    insulin degludec (TRESIBA FLEXTOUCH U-100) 100 unit/mL (3 mL) inpn 20 Units by SubCUTAneous route nightly.  Insulin Needles, Disposable, (PEN NEEDLE) 32 gauge x 5/32\" ndle For daily use with tresiba/long acting, may subsititute for which ever insurance will cover    dilTIAZem CD (CARDIZEM CD) 240 mg ER capsule Take 1 Cap by mouth daily.  nitroglycerin (NITROSTAT) 0.4 mg SL tablet 1 Tab by SubLINGual route every five (5) minutes as needed for Chest Pain.  rosuvastatin (CRESTOR) 20 mg tablet Take 1 Tab by mouth nightly.  XARELTO 20 mg tab tablet TAKE 1 TABLET BY MOUTH EVERY DAY WITH DINNER    albuterol (PROVENTIL VENTOLIN) 2.5 mg /3 mL (0.083 %) nebulizer solution USE 1 VIAL VIA NEBULIZER Q 4 H PRF WHEEZING    benzonatate (TESSALON) 100 mg capsule TK ONE C PO  TID PRN COUGH    ondansetron (ZOFRAN ODT) 8 mg disintegrating tablet Take 1 Tab by mouth every eight (8) hours as needed for Nausea.  mupirocin (BACTROBAN) 2 % ointment Apply  to affected area daily.     lisinopril (PRINIVIL, ZESTRIL) 5 mg tablet TAKE 1 TABLET BY MOUTH EVERY DAY    SITagliptin (JANUVIA) 100 mg tablet TAKE 1 TABLET BY MOUTH DAILY    ADVAIR DISKUS 250-50 mcg/dose diskus inhaler INHALE 1 PUFF BY MOUTH TWICE DAILY    aspirin delayed-release 81 mg tablet Take  by mouth daily.  naproxen sodium (ALEVE) 220 mg cap Take  by mouth daily. Indications: for pain    umeclidinium (INCRUSE ELLIPTA) 62.5 mcg/actuation inhaler Take 1 Puff by inhalation daily.  insulin aspart (NOVOLOG) 100 unit/mL inpn 10 units QAC SubQ, dis 1 package    gabapentin (NEURONTIN) 300 mg capsule TAKE 1 CAPSULE BY MOUTH THREE TIMES DAILY    Insulin Needles, Disposable, 31 gauge x 8/18\" ndle For application of insulin.  Blood-Glucose Meter monitoring kit Use as directed. Dispense 1 meter kit. 0 refills. Check twice a day, before each insulin dose.  Insulin Syringes, Disposable, 1 mL syrg 1 Syringe by Does Not Apply route two (2) times a day. Before breakfast and 12 hours later.  Lancets misc For use with glucose monitor. No current facility-administered medications for this visit. I have reviewed the nurses notes, vitals, problem list, allergy list, medical history, family, social history and medications. REVIEW OF SYMPTOMS      General: Pt denies excessive weight gain or loss. Pt is able to conduct ADL's  HEENT: Denies blurred vision, headaches, hearing loss, epistaxis and difficulty swallowing. Respiratory: Denies cough, congestion, shortness of breath, FLORES, wheezing or stridor.   Cardiovascular: Denies precordial pain, palpitations, edema or PND  Gastrointestinal: Denies poor appetite, indigestion, abdominal pain or blood in stool  Genitourinary: Denies hematuria, dysuria, increased urinary frequency  Musculoskeletal: Denies joint pain or swelling from muscles or joints  Neurologic: Denies tremor, paresthesias, headache, or sensory motor disturbance  Psychiatric: Denies confusion, insomnia, depression  Integumentray: Denies rash, itching or ulcers. Hematologic: Denies easy bruising, bleeding       PHYSICAL EXAMINATION      There were no vitals filed for this visit. General: Well developed, in no acute distress. HEENT: No jaundice, oral mucosa moist, no oral ulcers  Neck: Supple, no stiffness, no lymphadenopathy, supple  Heart:  Normal S1/S2 negative S3 or S4. Regular, no murmur, gallop or rub, no jugular venous distention  Respiratory: Clear bilaterally x 4, no wheezing or rales  Abdomen:   Soft, non-tender, bowel sounds are active.   Extremities:  No edema, normal cap refill, no cyanosis. Musculoskeletal: No clubbing, no deformities  Neuro: A&Ox3, speech clear, gait stable, cooperative, no focal neurologic deficits  Skin: Skin color is normal. No rashes or lesions. Non diaphoretic, moist.  Vascular: 2+ pulses symmetric in all extremities       DIAGNOSTIC DATA      EKG:        LABORATORY DATA      Lab Results   Component Value Date/Time    WBC 10.0 04/19/2018 03:58 PM    HGB 13.8 04/19/2018 03:58 PM    HCT 42.7 04/19/2018 03:58 PM    PLATELET 082 59/58/4645 03:58 PM    MCV 92.2 04/19/2018 03:58 PM      Lab Results   Component Value Date/Time    Sodium 138 04/19/2018 03:58 PM    Potassium 4.1 04/19/2018 03:58 PM    Chloride 102 04/19/2018 03:58 PM    CO2 27 04/19/2018 03:58 PM    Anion gap 9 04/19/2018 03:58 PM    Glucose 138 (H) 04/19/2018 03:58 PM    BUN 7 04/19/2018 03:58 PM    Creatinine 0.85 04/19/2018 03:58 PM    BUN/Creatinine ratio 8 (L) 04/19/2018 03:58 PM    GFR est AA >60 04/19/2018 03:58 PM    GFR est non-AA >60 04/19/2018 03:58 PM    Calcium 9.4 04/19/2018 03:58 PM    Bilirubin, total 0.3 04/19/2018 03:58 PM    AST (SGOT) 17 04/19/2018 03:58 PM    Alk.  phosphatase 105 04/19/2018 03:58 PM    Protein, total 8.0 04/19/2018 03:58 PM    Albumin 3.7 04/19/2018 03:58 PM    Globulin 4.3 (H) 04/19/2018 03:58 PM    A-G Ratio 0.9 (L) 04/19/2018 03:58 PM    ALT (SGPT) 19 04/19/2018 03:58 PM ASSESSMENT      1. Atrial Fibrillation    Paroxysmal    Xarelto- CHADS-VASC 4  2. Hypertension  3. COPD  4. CAD  5. DM  6. PVD       PLAN     She is tolerating current drug regimen without symptoms. Will continue diltiazem and Xarelto and monitor for symptomatic AF/tachycardia. Consider AAD versus AF ablation in the future- will need stress testing results prior to AAD selection. FOLLOW-UP       Thank you, Blade Beth DO and Dr. Austin Linton for allowing me to participate in the care of this extraordinarily pleasant female. Please do not hesitate to contact me for further questions/concerns.      EARL Bosch MD  Cardiac Electrophysiology / Cardiology    [address]  [address] [facility], [facility], [address] [place] [address]  [phone] / [phone] Fax   [phone] / [phone] Fax

## 2018-05-04 ENCOUNTER — DOCUMENTATION ONLY (OUTPATIENT)
Dept: FAMILY MEDICINE CLINIC | Age: 58
End: 2018-05-04

## 2018-05-09 ENCOUNTER — CLINICAL SUPPORT (OUTPATIENT)
Dept: CARDIOLOGY CLINIC | Age: 58
End: 2018-05-09

## 2018-05-09 DIAGNOSIS — I25.10 CORONARY ARTERY DISEASE INVOLVING NATIVE CORONARY ARTERY OF NATIVE HEART WITHOUT ANGINA PECTORIS: ICD-10-CM

## 2018-05-09 DIAGNOSIS — I65.23 BILATERAL CAROTID ARTERY STENOSIS: Primary | ICD-10-CM

## 2018-05-09 DIAGNOSIS — G45.8 SUBCLAVIAN STEAL SYNDROME: ICD-10-CM

## 2018-05-09 DIAGNOSIS — E78.00 HYPERCHOLESTEREMIA: ICD-10-CM

## 2018-05-09 DIAGNOSIS — I10 ESSENTIAL HYPERTENSION: ICD-10-CM

## 2018-05-09 DIAGNOSIS — I25.84 CORONARY ARTERY DISEASE DUE TO CALCIFIED CORONARY LESION: ICD-10-CM

## 2018-05-09 DIAGNOSIS — R07.9 CHEST PAIN, UNSPECIFIED TYPE: Primary | ICD-10-CM

## 2018-05-09 DIAGNOSIS — I48.0 PAROXYSMAL ATRIAL FIBRILLATION (HCC): ICD-10-CM

## 2018-05-09 DIAGNOSIS — I25.10 CORONARY ARTERY DISEASE DUE TO CALCIFIED CORONARY LESION: ICD-10-CM

## 2018-05-09 NOTE — PROGRESS NOTES
See scanned report. Dr. Megan Levi ordered study and Dr. Malick Butler read study. ID verified per protocol. Explained procedure to patient. Obtaining IV access, radiation exposure, risks and discomforts (for exercise- change to lexiwalk stress). , waiting between injection(s) and obtaining images. All concerns and questions addressed prior to obtaining consent test. Patient developed dyspnea  during test. At 1 minute in recovery, patient without symptoms or complaints voiced.

## 2018-05-09 NOTE — PROCEDURES
[facility]  *** FINAL REPORT ***    Name: Vnaessa Collado  MRN: [id number]       Outpatient  : 28 Dec 1960  HIS Order #: [id number]  [id number] [facility] Visit #: [id number]  Date: 09 May 2018    TYPE OF TEST: Cerebrovascular Duplex    REASON FOR TEST  Known carotid stenosis    Right Carotid:-             Proximal               Mid                 Distal  cm/s  Systolic  Diastolic  Systolic  Diastolic  Systolic  Diastolic  CCA:     86.5       6.0                            59.0      15.0  Bulb:  ECA:    170.0      19.0  ICA:     74.0      15.0       67.0      17.0       60.0      20.0  ICA/CCA:  1.3       1.0    ICA Stenosis: <50%    Right Vertebral:-  Finding: Antegrade  Sys:       61.0  Wanda:       19.0    Right Subclavian: <50% stenosis    Left Carotid:-            Proximal                Mid                 Distal  cm/s  Systolic  Diastolic  Systolic  Diastolic  Systolic  Diastolic  CCA:     56.8       9.0                            95.0      26.0  Bulb:  ECA:    141.0      12.0  ICA:     93.0      24.0       90.0      29.0      108.0      36.0  ICA/CCA:  1.0       0.9    ICA Stenosis: <50%    Left Vertebral:-  Finding: Antegrade  Sys:       59.0  Wanda:       19.0    Left Subclavian: <50% stenosis    INTERPRETATION/FINDINGS  PROCEDURE:  Evaluation of the extracranial cerebrovascular arteries  with ultrasound (B-mode imaging, pulsed Doppler, color Doppler). Includes the common carotid, internal carotid, external carotid, and  vertebral arteries. FINDINGS:    Right:  There is mild scattered mixed plaque seen within the common  carotid artery, the bifurcation and within the proximal internal and  external carotid arteries. Mild filling defects are noted on color  flow imaging and peak systolic velocities are normal.    Left:  The common carotid artery exhibits mild plaquing with no  appreciable stenosis.   Mild calcific plaque is visualized at the level   of the bifurcation with additional mixed plaque in the proximal  segments of the internal and external carotid arteries. Color flow  imaging reveals mild filling defects and peak systolic velocities are  normal.    IMPRESSION:  1. Bilateral 10-49% stenosis of the internal carotid arteries. 2. No significant stenosis in the external carotid arteries  bilaterally. 3. Antegrade flow in both vertebral arteries. Brachial pressures are  symmetric. 4. Normal flow in both subclavian arteries. 5. In comparison to the previous study done on 8/27/2015, there is no  evidence of a significant progression of stenosis on today's exam.  Unable to prove subclavian steal syndrome today. The left vertebral  artery is antegrade flow and the brachial pressures were taken  multiple times before, during and post Lexican and only varied less  than 20 mmHg. ADDITIONAL COMMENTS    I have personally reviewed the data relevant to the interpretation of  this  study.     TECHNOLOGIST: MARY Moore  Signed: 05/09/2018 12:41 PM    PHYSICIAN: Jatinder Stephen MD  Signed: 05/14/2018 03:57 PM

## 2018-05-14 LAB
LEFT EYE DIABETIC RETINOPATHY: ABNORMAL
LEFT EYE IMAGE QUALITY: ABNORMAL
LEFT EYE MACULAR EDEMA: ABNORMAL
LEFT EYE OTHER RETINOPATHY: ABNORMAL
RESULT: ABNORMAL
RIGHT EYE DIABETIC RETINOPATHY: ABNORMAL
RIGHT EYE IMAGE QUALITY: ABNORMAL
RIGHT EYE MACULAR EDEMA: ABNORMAL
RIGHT EYE OTHER RETINOPATHY: ABNORMAL
SEVERITY: ABNORMAL

## 2018-05-15 ENCOUNTER — TELEPHONE (OUTPATIENT)
Dept: FAMILY MEDICINE CLINIC | Age: 58
End: 2018-05-15

## 2018-05-15 NOTE — PROGRESS NOTES
No diabetic eye disease but there is a possible abnormality with her R eye and it is recommended she be seen by an eye dr for a dilated eye exam within 2 weeks.   The reading states a possible hollenhorst plaque and if this is so she would need further testing

## 2018-05-15 NOTE — TELEPHONE ENCOUNTER
Two patient Identification confirmed. Per Dr. Preeti Maravilla spoke with pt in regards to correct pharmacy receiving testing supplies. Pt states receiving testing supplies from [employer]. Pt states does need refill for Insulin Needles 32 gauge x 5/32\" (Last refill was 4/27/2018)  Pt states self testing x2 a day.

## 2018-05-24 RX ORDER — SIMVASTATIN 40 MG/1
TABLET, FILM COATED ORAL
Qty: 90 TAB | Refills: 0 | Status: SHIPPED | OUTPATIENT
Start: 2018-05-24 | End: 2018-10-25 | Stop reason: ALTCHOICE

## 2018-06-19 ENCOUNTER — DOCUMENTATION ONLY (OUTPATIENT)
Dept: FAMILY MEDICINE CLINIC | Age: 58
End: 2018-06-19

## 2018-07-09 ENCOUNTER — DOCUMENTATION ONLY (OUTPATIENT)
Dept: FAMILY MEDICINE CLINIC | Age: 58
End: 2018-07-09

## 2018-09-26 DIAGNOSIS — I10 ESSENTIAL HYPERTENSION: ICD-10-CM

## 2018-09-26 RX ORDER — LISINOPRIL 5 MG/1
TABLET ORAL
Qty: 30 TAB | Refills: 0 | Status: SHIPPED | OUTPATIENT
Start: 2018-09-26 | End: 2018-10-25 | Stop reason: SDUPTHER

## 2018-10-25 DIAGNOSIS — E11.65 TYPE 2 DIABETES MELLITUS WITH HYPERGLYCEMIA, WITH LONG-TERM CURRENT USE OF INSULIN (HCC): ICD-10-CM

## 2018-10-25 DIAGNOSIS — I10 ESSENTIAL HYPERTENSION: ICD-10-CM

## 2018-10-25 DIAGNOSIS — Z79.4 TYPE 2 DIABETES MELLITUS WITH HYPERGLYCEMIA, WITH LONG-TERM CURRENT USE OF INSULIN (HCC): ICD-10-CM

## 2018-10-25 RX ORDER — LISINOPRIL 5 MG/1
TABLET ORAL
Qty: 30 TAB | Refills: 0 | Status: SHIPPED | OUTPATIENT
Start: 2018-10-25 | End: 2018-11-26 | Stop reason: SDUPTHER

## 2018-10-25 RX ORDER — INSULIN DEGLUDEC INJECTION 100 U/ML
INJECTION, SOLUTION SUBCUTANEOUS
Qty: 6 PEN | Refills: 5 | Status: SHIPPED | OUTPATIENT
Start: 2018-10-25 | End: 2019-05-31 | Stop reason: SDUPTHER

## 2018-10-25 RX ORDER — ROSUVASTATIN CALCIUM 20 MG/1
20 TABLET, COATED ORAL
Qty: 30 TAB | Refills: 5 | Status: SHIPPED | OUTPATIENT
Start: 2018-10-25 | End: 2019-04-23 | Stop reason: SDUPTHER

## 2018-11-26 DIAGNOSIS — I10 ESSENTIAL HYPERTENSION: ICD-10-CM

## 2018-11-26 RX ORDER — LISINOPRIL 5 MG/1
TABLET ORAL
Qty: 30 TAB | Refills: 11 | Status: SHIPPED | OUTPATIENT
Start: 2018-11-26 | End: 2019-10-17

## 2018-11-28 DIAGNOSIS — I10 ESSENTIAL HYPERTENSION: ICD-10-CM

## 2018-11-28 RX ORDER — DILTIAZEM HYDROCHLORIDE 240 MG/1
CAPSULE, EXTENDED RELEASE ORAL
Qty: 30 CAP | Refills: 0 | Status: SHIPPED | OUTPATIENT
Start: 2018-11-28 | End: 2019-10-17

## 2018-11-29 RX ORDER — GABAPENTIN 300 MG/1
CAPSULE ORAL
Qty: 270 CAP | Refills: 0 | Status: SHIPPED | OUTPATIENT
Start: 2018-11-29 | End: 2019-01-23 | Stop reason: SDUPTHER

## 2018-11-29 RX ORDER — LISINOPRIL 5 MG/1
TABLET ORAL
Qty: 30 TAB | Refills: 0 | Status: SHIPPED | OUTPATIENT
Start: 2018-11-29 | End: 2019-10-17 | Stop reason: SDUPTHER

## 2018-11-29 RX ORDER — BENZONATATE 100 MG/1
CAPSULE ORAL
Qty: 30 CAP | Refills: 0 | Status: SHIPPED | OUTPATIENT
Start: 2018-11-29 | End: 2020-06-23

## 2018-11-29 RX ORDER — DILTIAZEM HYDROCHLORIDE 240 MG/1
CAPSULE, EXTENDED RELEASE ORAL
Qty: 30 CAP | Refills: 2 | Status: SHIPPED | OUTPATIENT
Start: 2018-11-29 | End: 2019-03-24 | Stop reason: SDUPTHER

## 2018-12-20 RX ORDER — RIVAROXABAN 20 MG/1
TABLET, FILM COATED ORAL
Qty: 30 TAB | Refills: 11 | Status: SHIPPED | OUTPATIENT
Start: 2018-12-20 | End: 2019-12-19

## 2019-01-23 RX ORDER — GABAPENTIN 300 MG/1
CAPSULE ORAL
Qty: 270 CAP | Refills: 0 | Status: SHIPPED | OUTPATIENT
Start: 2019-01-23 | End: 2019-07-10 | Stop reason: SDUPTHER

## 2019-01-24 ENCOUNTER — DOCUMENTATION ONLY (OUTPATIENT)
Dept: FAMILY MEDICINE CLINIC | Age: 59
End: 2019-01-24

## 2019-01-28 ENCOUNTER — DOCUMENTATION ONLY (OUTPATIENT)
Dept: FAMILY MEDICINE CLINIC | Age: 59
End: 2019-01-28

## 2019-01-28 NOTE — PROGRESS NOTES
Faxed order form to [facility] @ [phone]. Confirmation number [id number]. Original form placed in scan folder for central scanning.

## 2019-02-25 DIAGNOSIS — E11.65 TYPE 2 DIABETES MELLITUS WITH HYPERGLYCEMIA, WITH LONG-TERM CURRENT USE OF INSULIN (HCC): ICD-10-CM

## 2019-02-25 DIAGNOSIS — Z79.4 TYPE 2 DIABETES MELLITUS WITH HYPERGLYCEMIA, WITH LONG-TERM CURRENT USE OF INSULIN (HCC): ICD-10-CM

## 2019-02-26 RX ORDER — INSULIN ASPART 100 [IU]/ML
INJECTION, SOLUTION INTRAVENOUS; SUBCUTANEOUS
Qty: 15 ML | Refills: 0 | Status: SHIPPED | OUTPATIENT
Start: 2019-02-26 | End: 2019-05-31 | Stop reason: SDUPTHER

## 2019-03-26 RX ORDER — DILTIAZEM HYDROCHLORIDE 240 MG/1
CAPSULE, EXTENDED RELEASE ORAL
Qty: 30 CAP | Refills: 0 | Status: SHIPPED | OUTPATIENT
Start: 2019-03-26 | End: 2019-04-23 | Stop reason: SDUPTHER

## 2019-03-26 NOTE — TELEPHONE ENCOUNTER
Refill is per verbal order of Dr. Love Bhatti.    Requested Prescriptions     Pending Prescriptions Disp Refills    CARTIA  mg ER capsule [Pharmacy Med Name: CARTIA  (DILTIAZEM) 240MGXT CAPS] 30 Cap 0     Sig: TAKE 1 CAPSULE BY MOUTH DAILY

## 2019-04-11 ENCOUNTER — DOCUMENTATION ONLY (OUTPATIENT)
Dept: FAMILY MEDICINE CLINIC | Age: 59
End: 2019-04-11

## 2019-04-11 NOTE — PROGRESS NOTES
Order faxed to [address] [place] [address]. Fax number [phone] confirmation number [id number].

## 2019-05-03 RX ORDER — ROSUVASTATIN CALCIUM 20 MG/1
TABLET, COATED ORAL
Qty: 30 TAB | Refills: 0 | Status: SHIPPED | OUTPATIENT
Start: 2019-05-03 | End: 2019-05-23 | Stop reason: SDUPTHER

## 2019-05-03 RX ORDER — DILTIAZEM HYDROCHLORIDE 240 MG/1
CAPSULE, COATED, EXTENDED RELEASE ORAL
Qty: 30 CAP | Refills: 0 | Status: SHIPPED | OUTPATIENT
Start: 2019-05-03 | End: 2019-05-23 | Stop reason: SDUPTHER

## 2019-05-03 NOTE — TELEPHONE ENCOUNTER
Refills are per verbal order of Dr. Luis Hampton.    Requested Prescriptions     Pending Prescriptions Disp Refills    dilTIAZem CD (CARDIZEM CD) 240 mg ER capsule [Pharmacy Med Name: DILTIAZEM CD 240MG CAPSULES (24 HR)] 30 Cap 0     Sig: TAKE ONE CAPSULE BY MOUTH EVERY DAY    rosuvastatin (CRESTOR) 20 mg tablet [Pharmacy Med Name: ROSUVASTATIN 20MG TABLETS] 30 Tab 0     Sig: TAKE 1 TABLET BY MOUTH EVERY NIGHT

## 2019-05-08 DIAGNOSIS — E11.65 TYPE 2 DIABETES MELLITUS WITH HYPERGLYCEMIA, WITH LONG-TERM CURRENT USE OF INSULIN (HCC): ICD-10-CM

## 2019-05-08 DIAGNOSIS — Z79.4 TYPE 2 DIABETES MELLITUS WITH HYPERGLYCEMIA, WITH LONG-TERM CURRENT USE OF INSULIN (HCC): ICD-10-CM

## 2019-05-08 RX ORDER — PEN NEEDLE, DIABETIC 31 GX3/16"
NEEDLE, DISPOSABLE MISCELLANEOUS
Qty: 100 PEN NEEDLE | Refills: 0 | Status: SHIPPED | OUTPATIENT
Start: 2019-05-08 | End: 2019-09-02 | Stop reason: SDUPTHER

## 2019-05-24 RX ORDER — ROSUVASTATIN CALCIUM 20 MG/1
TABLET, COATED ORAL
Qty: 30 TAB | Refills: 0 | Status: SHIPPED | OUTPATIENT
Start: 2019-05-24 | End: 2019-08-03 | Stop reason: SDUPTHER

## 2019-05-24 RX ORDER — DILTIAZEM HYDROCHLORIDE 240 MG/1
CAPSULE, COATED, EXTENDED RELEASE ORAL
Qty: 30 CAP | Refills: 0 | Status: SHIPPED | OUTPATIENT
Start: 2019-05-24 | End: 2019-06-22 | Stop reason: SDUPTHER

## 2019-05-24 NOTE — TELEPHONE ENCOUNTER
Refills are per verbal order of Dr. Yane Livingston.    Requested Prescriptions     Pending Prescriptions Disp Refills    dilTIAZem CD (CARDIZEM CD) 240 mg ER capsule [Pharmacy Med Name: DILTIAZEM CD 240MG CAPSULES (24 HR)] 30 Cap 0     Sig: TAKE 1 CAPSULE BY MOUTH EVERY DAY    rosuvastatin (CRESTOR) 20 mg tablet [Pharmacy Med Name: ROSUVASTATIN 20MG TABLETS] 30 Tab 0     Sig: TAKE 1 TABLET BY MOUTH EVERY NIGHT

## 2019-05-29 ENCOUNTER — OFFICE VISIT (OUTPATIENT)
Dept: FAMILY MEDICINE CLINIC | Age: 59
End: 2019-05-29

## 2019-05-29 VITALS
DIASTOLIC BLOOD PRESSURE: 78 MMHG | TEMPERATURE: 98.3 F | RESPIRATION RATE: 16 BRPM | BODY MASS INDEX: 35.51 KG/M2 | WEIGHT: 193 LBS | OXYGEN SATURATION: 93 % | HEIGHT: 62 IN | SYSTOLIC BLOOD PRESSURE: 171 MMHG | HEART RATE: 95 BPM

## 2019-05-29 DIAGNOSIS — E78.00 HYPERCHOLESTEREMIA: ICD-10-CM

## 2019-05-29 DIAGNOSIS — Z00.00 MEDICARE ANNUAL WELLNESS VISIT, SUBSEQUENT: ICD-10-CM

## 2019-05-29 DIAGNOSIS — Z79.4 TYPE 2 DIABETES MELLITUS WITH HYPERGLYCEMIA, WITH LONG-TERM CURRENT USE OF INSULIN (HCC): ICD-10-CM

## 2019-05-29 DIAGNOSIS — Z13.39 SCREENING FOR ALCOHOLISM: ICD-10-CM

## 2019-05-29 DIAGNOSIS — L40.3 PUSTULAR PSORIASIS OF PALMS AND SOLES: Primary | ICD-10-CM

## 2019-05-29 DIAGNOSIS — E11.65 TYPE 2 DIABETES MELLITUS WITH HYPERGLYCEMIA, WITH LONG-TERM CURRENT USE OF INSULIN (HCC): ICD-10-CM

## 2019-05-29 DIAGNOSIS — R80.9 MICROALBUMINURIA: ICD-10-CM

## 2019-05-29 RX ORDER — TRIAMCINOLONE ACETONIDE 1 MG/G
CREAM TOPICAL 2 TIMES DAILY
Qty: 45 G | Refills: 0 | Status: SHIPPED | OUTPATIENT
Start: 2019-05-29 | End: 2019-10-17 | Stop reason: SDUPTHER

## 2019-05-29 RX ORDER — ALBUTEROL SULFATE 90 UG/1
AEROSOL, METERED RESPIRATORY (INHALATION)
COMMUNITY
End: 2019-06-11 | Stop reason: SDUPTHER

## 2019-05-29 NOTE — PROGRESS NOTES
Jere Holland is a 62 y.o. female   Chief Complaint   Patient presents with    Follow-up    Diabetes    Labs    pt ehre for diabetes follow up and is well overdue for labs. Pt last A1C was over a year ago at 7.7 she is taking meds as Rx and using ehr insulin daily. She did not take her bp medication today and will take these once she gets home. Pt also with HLD on statin and toelrates this fine she is fasting. she is a 62y.o. year old female who presents for evalution. Reviewed PmHx, RxHx, FmHx, SocHx, AllgHx and updated and dated in the chart. Review of Systems - negative except as listed above in the HPI    Objective:     Vitals:    05/29/19 1123   BP: (!) 164/93   Pulse: 95   Resp: 16   Temp: 98.3 °F (36.8 °C)   TempSrc: Oral   SpO2: 93%   Weight: 193 lb (87.5 kg)   Height: 5' 2\" (1.575 m)       Current Outpatient Medications   Medication Sig    albuterol (PROVENTIL HFA, VENTOLIN HFA, PROAIR HFA) 90 mcg/actuation inhaler Take  by inhalation.  triamcinolone acetonide (KENALOG) 0.1 % topical cream Apply  to affected area two (2) times a day.  use thin layer    dilTIAZem CD (CARDIZEM CD) 240 mg ER capsule TAKE 1 CAPSULE BY MOUTH EVERY DAY    rosuvastatin (CRESTOR) 20 mg tablet TAKE 1 TABLET BY MOUTH EVERY NIGHT    SITagliptin (JANUVIA) 100 mg tablet TAKE 1 TABLET BY MOUTH EVERY DAY    Insulin Needles, Disposable, (YOMI PEN NEEDLE) 32 gauge x 5/32\" ndle FOR DAILY USE WITH TRESIBA/ LONG ACTING    insulin aspart U-100 (NOVOLOG FLEXPEN U-100 INSULIN) 100 unit/mL inpn INJECT 10 UNITS SUBCUTANEOUSLY BEFORE EACH MEAL    gabapentin (NEURONTIN) 300 mg capsule TAKE 1 CAPSULE BY MOUTH THREE TIMES DAILY    XARELTO 20 mg tab tablet TAKE 1 TABLET BY MOUTH EVERY DAY WITH DINNER    lisinopril (PRINIVIL, ZESTRIL) 5 mg tablet TAKE 1 TABLET BY MOUTH EVERY DAY    CARTIA  mg ER capsule TAKE 1 CAPSULE BY MOUTH DAILY    lisinopril (PRINIVIL, ZESTRIL) 5 mg tablet TAKE 1 TABLET BY MOUTH EVERY DAY    TRESIBA FLEXTOUCH U-100 100 unit/mL (3 mL) inpn INJECT 20 UNITS UNDER THE SKIN NIGHTLY    nitroglycerin (NITROSTAT) 0.4 mg SL tablet 1 Tab by SubLINGual route every five (5) minutes as needed for Chest Pain.  albuterol (PROVENTIL VENTOLIN) 2.5 mg /3 mL (0.083 %) nebulizer solution USE 1 VIAL VIA NEBULIZER Q 4 H PRF WHEEZING    ondansetron (ZOFRAN ODT) 8 mg disintegrating tablet Take 1 Tab by mouth every eight (8) hours as needed for Nausea.  mupirocin (BACTROBAN) 2 % ointment Apply  to affected area daily.  SITagliptin (JANUVIA) 100 mg tablet TAKE 1 TABLET BY MOUTH DAILY    ADVAIR DISKUS 250-50 mcg/dose diskus inhaler INHALE 1 PUFF BY MOUTH TWICE DAILY    aspirin delayed-release 81 mg tablet Take  by mouth daily.  naproxen sodium (ALEVE) 220 mg cap Take  by mouth daily. Indications: for pain    umeclidinium (INCRUSE ELLIPTA) 62.5 mcg/actuation inhaler Take 1 Puff by inhalation daily.  Insulin Needles, Disposable, 31 gauge x 2/46\" ndle For application of insulin.  Blood-Glucose Meter monitoring kit Use as directed. Dispense 1 meter kit. 0 refills. Check twice a day, before each insulin dose.  Insulin Syringes, Disposable, 1 mL syrg 1 Syringe by Does Not Apply route two (2) times a day. Before breakfast and 12 hours later.  Lancets misc For use with glucose monitor.  benzonatate (TESSALON) 100 mg capsule TAKE 1 CAPSULE BY MOUTH THREE TIMES DAILY AS NEEDED FOR COUGH     No current facility-administered medications for this visit.         Physical Examination: General appearance - alert, well appearing, and in no distress  Chest - clear to auscultation, no wheezes, rales or rhonchi, symmetric air entry  Heart - normal rate, regular rhythm, normal S1, S2, no murmurs, rubs, clicks or gallops  Abdomen - soft, nontender, nondistended, no masses or organomegaly  Skin - small pustular lesions on b/l hands without erythema, + pruritis      Assessment/ Plan:   Diagnoses and all orders for this visit: 1. Pustular psoriasis of palms and soles  -     triamcinolone acetonide (KENALOG) 0.1 % topical cream; Apply  to affected area two (2) times a day. use thin layer    2. Type 2 diabetes mellitus with hyperglycemia, with long-term current use of insulin (HCC)  -     MICROALBUMIN, UR, RAND W/ MICROALB/CREAT RATIO  -     HEMOGLOBIN A1C WITH EAG    3. Microalbuminuria  -     MICROALBUMIN, UR, RAND W/ MICROALB/CREAT RATIO    4. Hypercholesteremia  -     LIPID PANEL  -     METABOLIC PANEL, COMPREHENSIVE    5. Medicare annual wellness visit, subsequent    6. Screening for alcoholism  -     [place] ANNUAL ALCOHOL SCREEN 15 MIN     discussed need for proper f/u with her diabetes. Follow-up and Dispositions    · Return in about 3 months (around 8/29/2019), or if symptoms worsen or fail to improve. I have discussed the diagnosis with the patient and the intended plan as seen in the above orders. The patient has received an after-visit summary and questions were answered concerning future plans. Pt conveyed understanding of plan. Medication Side Effects and Warnings were discussed with patient      Cristi Kidney, DO     This is the Subsequent Medicare Annual Wellness Exam, performed 12 months or more after the Initial AWV or the last Subsequent AWV    I have reviewed the patient's medical history in detail and updated the computerized patient record.      History     Past Medical History:   Diagnosis Date    Aneurysm ([address]     cerebral    Anxiety     Asthma     Depression     Diabetes ([address]     Headache(784.0)     Hypercholesterolemia     Hypertension     Other ill-defined conditions(799.89)     high cholesterol    Other ill-defined conditions(799.89)     pvd    Stroke ([address] 2006    no residual      Past Surgical History:   Procedure Laterality Date    ANEURYSM, INTRACRAN, SIMPLE SURG      HX GYN      HX OTHER SURGICAL      excision cyst/bilateral axilla     Current Outpatient Medications Medication Sig Dispense Refill    albuterol (PROVENTIL HFA, VENTOLIN HFA, PROAIR HFA) 90 mcg/actuation inhaler Take  by inhalation.  triamcinolone acetonide (KENALOG) 0.1 % topical cream Apply  to affected area two (2) times a day. use thin layer 45 g 0    dilTIAZem CD (CARDIZEM CD) 240 mg ER capsule TAKE 1 CAPSULE BY MOUTH EVERY DAY 30 Cap 0    rosuvastatin (CRESTOR) 20 mg tablet TAKE 1 TABLET BY MOUTH EVERY NIGHT 30 Tab 0    SITagliptin (JANUVIA) 100 mg tablet TAKE 1 TABLET BY MOUTH EVERY DAY 90 Tab 0    Insulin Needles, Disposable, (YOMI PEN NEEDLE) 32 gauge x 5/32\" ndle FOR DAILY USE WITH TRESIBA/ LONG ACTING 100 Pen Needle 0    insulin aspart U-100 (NOVOLOG FLEXPEN U-100 INSULIN) 100 unit/mL inpn INJECT 10 UNITS SUBCUTANEOUSLY BEFORE EACH MEAL 15 mL 0    gabapentin (NEURONTIN) 300 mg capsule TAKE 1 CAPSULE BY MOUTH THREE TIMES DAILY 270 Cap 0    XARELTO 20 mg tab tablet TAKE 1 TABLET BY MOUTH EVERY DAY WITH DINNER 30 Tab 11    lisinopril (PRINIVIL, ZESTRIL) 5 mg tablet TAKE 1 TABLET BY MOUTH EVERY DAY 30 Tab 0    CARTIA  mg ER capsule TAKE 1 CAPSULE BY MOUTH DAILY 30 Cap 0    lisinopril (PRINIVIL, ZESTRIL) 5 mg tablet TAKE 1 TABLET BY MOUTH EVERY DAY 30 Tab 11    TRESIBA FLEXTOUCH U-100 100 unit/mL (3 mL) inpn INJECT 20 UNITS UNDER THE SKIN NIGHTLY 6 Pen 5    nitroglycerin (NITROSTAT) 0.4 mg SL tablet 1 Tab by SubLINGual route every five (5) minutes as needed for Chest Pain. 1 Bottle 5    albuterol (PROVENTIL VENTOLIN) 2.5 mg /3 mL (0.083 %) nebulizer solution USE 1 VIAL VIA NEBULIZER Q 4 H PRF WHEEZING  0    ondansetron (ZOFRAN ODT) 8 mg disintegrating tablet Take 1 Tab by mouth every eight (8) hours as needed for Nausea. 15 Tab 1    mupirocin (BACTROBAN) 2 % ointment Apply  to affected area daily.  22 g 0    SITagliptin (JANUVIA) 100 mg tablet TAKE 1 TABLET BY MOUTH DAILY 90 Tab 4    ADVAIR DISKUS 250-50 mcg/dose diskus inhaler INHALE 1 PUFF BY MOUTH TWICE DAILY 3 Each 3    aspirin delayed-release 81 mg tablet Take  by mouth daily.  naproxen sodium (ALEVE) 220 mg cap Take  by mouth daily. Indications: for pain      umeclidinium (INCRUSE ELLIPTA) 62.5 mcg/actuation inhaler Take 1 Puff by inhalation daily. 1 Inhaler 11    Insulin Needles, Disposable, 31 gauge x 8/22\" ndle For application of insulin. 1 Package 11    Blood-Glucose Meter monitoring kit Use as directed. Dispense 1 meter kit. 0 refills. Check twice a day, before each insulin dose. 1 Kit 0    Insulin Syringes, Disposable, 1 mL syrg 1 Syringe by Does Not Apply route two (2) times a day. Before breakfast and 12 hours later. 60 Syringe 3    Lancets misc For use with glucose monitor. 1 Each 11    benzonatate (TESSALON) 100 mg capsule TAKE 1 CAPSULE BY MOUTH THREE TIMES DAILY AS NEEDED FOR COUGH 30 Cap 0     Allergies   Allergen Reactions    Ciprofloxacin Nausea Only and Vertigo     Family History   Problem Relation Age of Onset    Hypertension Mother     Cancer Father     Breast Cancer Maternal Aunt     Breast Cancer Maternal Aunt      Social History     Tobacco Use    Smoking status: Current Every Day Smoker     Packs/day: 1.00     Years: 35.00     Pack years: 35.00    Smokeless tobacco: Never Used   Substance Use Topics    Alcohol use: Yes     Comment: occasional     Patient Active Problem List   Diagnosis Code    DM (diabetes mellitus) ([facility] E11.9    HTN (hypertension) I10    COPD (chronic obstructive pulmonary disease) ([facility]) J44.9    Depression F32.9    Sebaceous cyst L72.3    Diabetic neuropathy ([facility] E11.40    Migraine G43.909    Hypercholesteremia E78.00    Obesity (BMI 30-39. 9) E66.9    PVD (peripheral vascular disease) ([facility]) I73.9    Hyperglycemia due to type 2 diabetes mellitus ([facility] E11.65    Tobacco abuse Z72.0    Type 2 diabetes mellitus with hyperglycemia ([facility]) E11.65    Microalbuminuria R80.9    Coronary artery disease involving native coronary artery of native heart without angina pectoris I25.10       Depression Risk Factor Screening:   No flowsheet data found. Alcohol Risk Factor Screening: You do not drink alcohol or very rarely. Functional Ability and Level of Safety:   Hearing Loss  Hearing is good. Activities of Daily Living  The home contains: no safety equipment. Patient does total self care    Fall Risk  Fall Risk Assessment, last 12 mths 4/20/2016   Able to walk? Yes   Fall in past 12 months? No       Abuse Screen  Patient is not abused    Cognitive Screening   Evaluation of Cognitive Function:  Has your family/caregiver stated any concerns about your memory: no  Normal    Patient Care Team   Patient Care Team:  Stephanie Perkins DO as PCP - General (Family Practice)  Do Hylton MD (Cardiology)  Erlinda Sanchez, RN as Ambulatory Care Navigator [facility])    Assessment/Plan   Education and counseling provided:  Are appropriate based on today's review and evaluation    Diagnoses and all orders for this visit:    1. Pustular psoriasis of palms and soles  -     triamcinolone acetonide (KENALOG) 0.1 % topical cream; Apply  to affected area two (2) times a day. use thin layer    2. Type 2 diabetes mellitus with hyperglycemia, with long-term current use of insulin (HCC)  -     MICROALBUMIN, UR, RAND W/ MICROALB/CREAT RATIO  -     HEMOGLOBIN A1C WITH EAG    3. Microalbuminuria  -     MICROALBUMIN, UR, RAND W/ MICROALB/CREAT RATIO    4. Hypercholesteremia  -     LIPID PANEL  -     METABOLIC PANEL, COMPREHENSIVE    5. Medicare annual wellness visit, subsequent    6.  Screening for alcoholism  -     [place] ANNUAL ALCOHOL SCREEN 15 MIN        Health Maintenance Due   Topic Date Due    PAP AKA CERVICAL CYTOLOGY  12/28/1981    Shingrix Vaccine Age 50> (1 of 2) 12/28/2010    MEDICARE YEARLY EXAM  03/14/2018    BREAST CANCER SCRN MAMMOGRAM  09/23/2018    HEMOGLOBIN A1C Q6M  10/25/2018    LIPID PANEL Q1  02/19/2019     I have discussed the diagnosis with the patient and the intended plan as seen in the above orders. The patient has received an after-visit summary and questions were answered concerning future plans. Pt conveyed understanding of plan.  [facility]    Discussed the patient's BMI with her. The BMI follow up plan is as follows:     dietary management education, guidance, and counseling  encourage exercise  monitor weight  prescribed dietary intake    An After Visit Summary was printed and given to the patient.

## 2019-05-29 NOTE — PROGRESS NOTES
Chief Complaint   Patient presents with    Follow-up    Diabetes    Labs     Patient presents in office today for f/u to DM and labs. No concerns. 1. Have you been to the ER, urgent care clinic since your last visit? Hospitalized since your last visit? No    2. Have you seen or consulted any other health care providers outside of the [address] [place] [address] since your last visit? Include any pap smears or colon screening.  No    Learning Assessment 3/29/2018   PRIMARY LEARNER Patient   HIGHEST LEVEL OF EDUCATION - PRIMARY LEARNER  GRADUATED HIGH SCHOOL OR GED   BARRIERS PRIMARY LEARNER NONE   CO-LEARNER CAREGIVER No   PRIMARY LANGUAGE ENGLISH   LEARNER PREFERENCE PRIMARY DEMONSTRATION   ANSWERED BY Patient   RELATIONSHIP SELF

## 2019-05-29 NOTE — PATIENT INSTRUCTIONS
Medicare Wellness Visit, Female     The best way to live healthy is to have a lifestyle where you eat a well-balanced diet, exercise regularly, limit alcohol use, and quit all forms of tobacco/nicotine, if applicable. Regular preventive services are another way to keep healthy. Preventive services (vaccines, screening tests, monitoring & exams) can help personalize your care plan, which helps you manage your own care. Screening tests can find health problems at the earliest stages, when they are easiest to treat. [facility] follows the current, evidence-based guidelines published by the [facility] ([facility]) when recommending preventive services for our patients. Because we follow these guidelines, sometimes recommendations change over time as research supports it. (For example, mammograms used to be recommended annually. Even though Medicare will still pay for an annual mammogram, the newer guidelines recommend a mammogram every two years for women of average risk.)  Of course, you and your doctor may decide to screen more often for some diseases, based on your risk and your health status. Preventive services for you include:  - Medicare offers their members a free annual wellness visit, which is time for you and your primary care provider to discuss and plan for your preventive service needs. Take advantage of this benefit every year!  -All adults over the age of 72 should receive the recommended pneumonia vaccines. Current USPSTF guidelines recommend a series of two vaccines for the best pneumonia protection.   -All adults should have a flu vaccine yearly and a tetanus vaccine every 10 years. All adults age 61 and older should receive a shingles vaccine once in their lifetime.    -A bone mass density test is recommended when a woman turns 65 to screen for osteoporosis. This test is only recommended one time, as a screening.  Some providers will use this same test as a disease monitoring tool if you already have osteoporosis. -All adults age 38-68 who are overweight should have a diabetes screening test once every three years.   -Other screening tests and preventive services for persons with diabetes include: an eye exam to screen for diabetic retinopathy, a kidney function test, a foot exam, and stricter control over your cholesterol.   -Cardiovascular screening for adults with routine risk involves an electrocardiogram (ECG) at intervals determined by your doctor.   -Colorectal cancer screenings should be done for adults age 54-65 with no increased risk factors for colorectal cancer. There are a number of acceptable methods of screening for this type of cancer. Each test has its own benefits and drawbacks. Discuss with your doctor what is most appropriate for you during your annual wellness visit. The different tests include: colonoscopy (considered the best screening method), a fecal occult blood test, a fecal DNA test, and sigmoidoscopy. -Breast cancer screenings are recommended every other year for women of normal risk, age 54-69.  -Cervical cancer screenings for women over age 72 are only recommended with certain risk factors.   -All adults born between [facility] should be screened once for Hepatitis C. Here is a list of your current Health Maintenance items (your personalized list of preventive services) with a due date:  Health Maintenance Due   Topic Date Due    Pap Test  12/28/1981    Shingles Vaccine (1 of 2) 12/28/2010    Annual Well Visit  03/14/2018    Mammogram  09/23/2018    Hemoglobin A1C    10/25/2018    Cholesterol Test   02/19/2019              Body Mass Index: Care Instructions  Your Care Instructions    Body mass index (BMI) can help you see if your weight is raising your risk for health problems. It uses a formula to compare how much you weigh with how tall you are. · A BMI lower than 18.5 is considered underweight.   · A BMI between 18.5 and 24.9 is considered healthy. · A BMI between 25 and 29.9 is considered overweight. A BMI of 30 or higher is considered obese. If your BMI is in the normal range, it means that you have a lower risk for weight-related health problems. If your BMI is in the overweight or obese range, you may be at increased risk for weight-related health problems, such as high blood pressure, heart disease, stroke, arthritis or joint pain, and diabetes. If your BMI is in the underweight range, you may be at increased risk for health problems such as fatigue, lower protection (immunity) against illness, muscle loss, bone loss, hair loss, and hormone problems. BMI is just one measure of your risk for weight-related health problems. You may be at higher risk for health problems if you are not active, you eat an unhealthy diet, or you drink too much alcohol or use tobacco products. Follow-up care is a key part of your treatment and safety. Be sure to make and go to all appointments, and call your doctor if you are having problems. It's also a good idea to know your test results and keep a list of the medicines you take. How can you care for yourself at home? · Practice healthy eating habits. This includes eating plenty of fruits, vegetables, whole grains, lean protein, and low-fat dairy. · If your doctor recommends it, get more exercise. Walking is a good choice. Bit by bit, increase the amount you walk every day. Try for at least 30 minutes on most days of the week. · Do not smoke. Smoking can increase your risk for health problems. If you need help quitting, talk to your doctor about stop-smoking programs and medicines. These can increase your chances of quitting for good. · Limit alcohol to 2 drinks a day for men and 1 drink a day for women. Too much alcohol can cause health problems. If you have a BMI higher than 25  · Your doctor may do other tests to check your risk for weight-related health problems.  This may include measuring the distance around your waist. A waist measurement of more than 40 inches in men or 35 inches in women can increase the risk of weight-related health problems. · Talk with your doctor about steps you can take to stay healthy or improve your health. You may need to make lifestyle changes to lose weight and stay healthy, such as changing your diet and getting regular exercise. If you have a BMI lower than 18.5  · Your doctor may do other tests to check your risk for health problems. · Talk with your doctor about steps you can take to stay healthy or improve your health. You may need to make lifestyle changes to gain or maintain weight and stay healthy, such as getting more healthy foods in your diet and doing exercises to build muscle. Where can you learn more? Go to http://lauro-oksana.info/. Enter S176 in the search box to learn more about \"Body Mass Index: Care Instructions. \"  Current as of: October 13, 2016  Content Version: 11.4  © 0233-9582 [employer], [employer]. Care instructions adapted under license by [employer] (which disclaims liability or warranty for this information). If you have questions about a medical condition or this instruction, always ask your healthcare professional. [address] any warranty or liability for your use of this information.

## 2019-05-30 LAB
ALBUMIN SERPL-MCNC: 4.3 G/DL (ref 3.5–5.5)
ALBUMIN/CREAT UR: 406.5 MG/G CREAT (ref 0–30)
ALBUMIN/GLOB SERPL: 1.5 {RATIO} (ref 1.2–2.2)
ALP SERPL-CCNC: 102 IU/L (ref 39–117)
ALT SERPL-CCNC: 14 IU/L (ref 0–32)
AST SERPL-CCNC: 18 IU/L (ref 0–40)
BILIRUB SERPL-MCNC: 0.2 MG/DL (ref 0–1.2)
BUN SERPL-MCNC: 8 MG/DL (ref 6–24)
BUN/CREAT SERPL: 11 (ref 9–23)
CALCIUM SERPL-MCNC: 9 MG/DL (ref 8.7–10.2)
CHLORIDE SERPL-SCNC: 102 MMOL/L (ref 96–106)
CHOLEST SERPL-MCNC: 124 MG/DL (ref 100–199)
CO2 SERPL-SCNC: 23 MMOL/L (ref 20–29)
CREAT SERPL-MCNC: 0.71 MG/DL (ref 0.57–1)
CREAT UR-MCNC: 77 MG/DL
EST. AVERAGE GLUCOSE BLD GHB EST-MCNC: 186 MG/DL
GLOBULIN SER CALC-MCNC: 2.9 G/DL (ref 1.5–4.5)
GLUCOSE SERPL-MCNC: 217 MG/DL (ref 65–99)
HBA1C MFR BLD: 8.1 % (ref 4.8–5.6)
HDLC SERPL-MCNC: 35 MG/DL
INTERPRETATION, 910389: NORMAL
LDLC SERPL CALC-MCNC: 51 MG/DL (ref 0–99)
Lab: NORMAL
MICROALBUMIN UR-MCNC: 313 UG/ML
POTASSIUM SERPL-SCNC: 3.8 MMOL/L (ref 3.5–5.2)
PROT SERPL-MCNC: 7.2 G/DL (ref 6–8.5)
SODIUM SERPL-SCNC: 142 MMOL/L (ref 134–144)
TRIGL SERPL-MCNC: 192 MG/DL (ref 0–149)
VLDLC SERPL CALC-MCNC: 38 MG/DL (ref 5–40)

## 2019-05-31 DIAGNOSIS — E11.65 TYPE 2 DIABETES MELLITUS WITH HYPERGLYCEMIA, WITH LONG-TERM CURRENT USE OF INSULIN (HCC): ICD-10-CM

## 2019-05-31 DIAGNOSIS — Z79.4 TYPE 2 DIABETES MELLITUS WITH HYPERGLYCEMIA, WITH LONG-TERM CURRENT USE OF INSULIN (HCC): ICD-10-CM

## 2019-05-31 RX ORDER — INSULIN ASPART 100 [IU]/ML
INJECTION, SOLUTION INTRAVENOUS; SUBCUTANEOUS
Qty: 15 ML | Refills: 5 | Status: SHIPPED | OUTPATIENT
Start: 2019-05-31 | End: 2019-10-17

## 2019-05-31 RX ORDER — INSULIN DEGLUDEC 100 U/ML
INJECTION, SOLUTION SUBCUTANEOUS
Qty: 6 PEN | Refills: 5 | Status: SHIPPED | OUTPATIENT
Start: 2019-05-31 | End: 2020-06-23

## 2019-05-31 NOTE — PROGRESS NOTES
Diabetes uncontrolled, increase long acting to 30 units daily and short acting to 12u before each meal and recheck 3 months. Chol remains well controlled on medication.   Please advise pt to take her bp meds the day of her next appt

## 2019-05-31 NOTE — PROGRESS NOTES
Patient returned call to the office. Spoke with patient in reference to labs. Verbalized understanding.

## 2019-06-11 RX ORDER — ALBUTEROL SULFATE 90 UG/1
2 AEROSOL, METERED RESPIRATORY (INHALATION)
Qty: 1 INHALER | Refills: 11 | Status: SHIPPED | OUTPATIENT
Start: 2019-06-11 | End: 2020-07-09

## 2019-06-25 RX ORDER — DILTIAZEM HYDROCHLORIDE 240 MG/1
CAPSULE, COATED, EXTENDED RELEASE ORAL
Qty: 30 CAP | Refills: 0 | Status: SHIPPED | OUTPATIENT
Start: 2019-06-25 | End: 2019-10-17

## 2019-06-25 NOTE — TELEPHONE ENCOUNTER
Refill is per verbal order of Dr. Erlinda Alejo.    Requested Prescriptions     Pending Prescriptions Disp Refills    dilTIAZem CD (CARDIZEM CD) 240 mg ER capsule [Pharmacy Med Name: DILTIAZEM CD 240MG CAPSULES (24 HR)] 30 Cap 0     Sig: TAKE 1 CAPSULE BY MOUTH EVERY DAY

## 2019-07-10 DIAGNOSIS — E11.41 DIABETIC MONONEUROPATHY ASSOCIATED WITH TYPE 2 DIABETES MELLITUS (HCC): Primary | ICD-10-CM

## 2019-07-15 RX ORDER — DILTIAZEM HYDROCHLORIDE 240 MG/1
CAPSULE, EXTENDED RELEASE ORAL
Qty: 30 CAP | Refills: 0 | Status: SHIPPED | OUTPATIENT
Start: 2019-07-15 | End: 2019-07-16 | Stop reason: SDUPTHER

## 2019-07-16 RX ORDER — DILTIAZEM HYDROCHLORIDE 240 MG/1
CAPSULE, COATED, EXTENDED RELEASE ORAL
Qty: 90 CAP | Refills: 3 | Status: SHIPPED | OUTPATIENT
Start: 2019-07-16 | End: 2020-08-18

## 2019-07-16 RX ORDER — GABAPENTIN 300 MG/1
300 CAPSULE ORAL 3 TIMES DAILY
Qty: 270 CAP | Refills: 0 | OUTPATIENT
Start: 2019-07-16 | End: 2020-09-17 | Stop reason: SDUPTHER

## 2019-08-06 RX ORDER — ROSUVASTATIN CALCIUM 20 MG/1
TABLET, COATED ORAL
Qty: 15 TAB | Refills: 0 | Status: SHIPPED | OUTPATIENT
Start: 2019-08-06 | End: 2019-10-17 | Stop reason: SDUPTHER

## 2019-08-06 NOTE — TELEPHONE ENCOUNTER
Refill is per verbal order of Dr. Marilynne Fothergill.    Requested Prescriptions     Pending Prescriptions Disp Refills    rosuvastatin (CRESTOR) 20 mg tablet [Pharmacy Med Name: ROSUVASTATIN 20MG TABLETS] 15 Tab 0     Sig: TAKE 1 TABLET BY MOUTH EVERY NIGHT

## 2019-09-02 DIAGNOSIS — Z79.4 TYPE 2 DIABETES MELLITUS WITH HYPERGLYCEMIA, WITH LONG-TERM CURRENT USE OF INSULIN (HCC): ICD-10-CM

## 2019-09-02 DIAGNOSIS — E11.65 TYPE 2 DIABETES MELLITUS WITH HYPERGLYCEMIA, WITH LONG-TERM CURRENT USE OF INSULIN (HCC): ICD-10-CM

## 2019-09-03 RX ORDER — PEN NEEDLE, DIABETIC 31 GX3/16"
NEEDLE, DISPOSABLE MISCELLANEOUS
Qty: 100 PEN NEEDLE | Refills: 11 | Status: SHIPPED | OUTPATIENT
Start: 2019-09-03 | End: 2022-05-31 | Stop reason: SDUPTHER

## 2019-09-30 RX ORDER — ROSUVASTATIN CALCIUM 20 MG/1
TABLET, COATED ORAL
Qty: 15 TAB | Refills: 0 | OUTPATIENT
Start: 2019-09-30

## 2019-10-04 ENCOUNTER — PATIENT OUTREACH (OUTPATIENT)
Dept: FAMILY MEDICINE CLINIC | Age: 59
End: 2019-10-04

## 2019-10-08 NOTE — PROGRESS NOTES
Ambulatory Care Management Note      Date/Time:  10/7/19 3:45 PM    This patient was received as a referral from [address] [place] [address] reviewed with the provider   Medication compliance - Reports not taking Novolog most days. Only monitoring blood sugars a few times a week. See goals section. Ambulatory  contacted patient for discussion and case managements of diabetes. Summary of patients top problems:   1. DM 11: A1C 8.1 5/29/19. Poor diabetes management with monitoring blood sugars and taking insulin. Long acting insulin was increased to 30 units and short acting increased to 12 units. Due for repeat A1C.   2. A-fib: on cardizem and xarelto. Managed by Dr. Veronica Hernandez. Last seen in May, needs follow-up appointment. 3. Smoking: current every day smoker. Patient's challenges to self management identified:   ineffective coping, lack of knowledge about disease, level of motivation, medication management      Medication Management:  poor adherence, poor understanding    Advance Care Planning:   Does patient have an Advance Directive:  not on file    [employer], Referrals, and Durable Medical Equipment: none    PCP/Specialist follow up:   Future Appointments   Date Time Provider Ashley Hurt   10/17/2019 10:15 AM Favio Casanova, DO [facility] L[facility]AR SCHED          Goals      Skills and education necessary to properly manage diabetes      10/7/19 -   A1C 8.1 5/29/19  Current level of understanding: Poor understanding of disease and management with monitoring blood sugars and taking insulin. Reports checking blood sugars maybe a few times a week. Only taking Novolog a few times a week. Reports 100% compliance with long acting insulin. Long acting insulin was increased to 30 units and short acting increased to 12 units by Dr. Walter Ott in May. Patient due to repeat A1C.    Desired Outcome: Patient's will verbalize knowledge of disease process, medical, and nutrition management. Patient will test blood sugars regularly and take medications as prescribed. Patient will follow recommended diet plan. Plan: ACM booked appointment for repeat A1C on 10/17/19. Patient will attend appointment. Patient commits to checking fasting blood sugar once a day and keeping log. Patient will discuss log with ACM and bring to appointment. Patient concerned that she does not eat 3 meals a day, usually only has 2 meals. Patient will take Novolog as prescribed (12 units with meals, even if only 2 meals). Discussed taking Novolog at start of meal to avoid hypoglycemia. Reviewed s/s of hypoglycemia and how to manage low blood sugar. Patient verbalized understanding of all information discussed. Patient has this Nurse Navigators contact information for any further questions, concerns, or needs.

## 2019-10-17 ENCOUNTER — OFFICE VISIT (OUTPATIENT)
Dept: FAMILY MEDICINE CLINIC | Age: 59
End: 2019-10-17

## 2019-10-17 VITALS
DIASTOLIC BLOOD PRESSURE: 97 MMHG | TEMPERATURE: 98 F | BODY MASS INDEX: 36.62 KG/M2 | SYSTOLIC BLOOD PRESSURE: 197 MMHG | RESPIRATION RATE: 16 BRPM | HEART RATE: 86 BPM | OXYGEN SATURATION: 93 % | WEIGHT: 199 LBS | HEIGHT: 62 IN

## 2019-10-17 DIAGNOSIS — I10 ESSENTIAL HYPERTENSION: ICD-10-CM

## 2019-10-17 DIAGNOSIS — E11.65 TYPE 2 DIABETES MELLITUS WITH HYPERGLYCEMIA, WITH LONG-TERM CURRENT USE OF INSULIN (HCC): Primary | ICD-10-CM

## 2019-10-17 DIAGNOSIS — L40.3 PUSTULAR PSORIASIS OF PALMS AND SOLES: ICD-10-CM

## 2019-10-17 DIAGNOSIS — Z23 ENCOUNTER FOR IMMUNIZATION: ICD-10-CM

## 2019-10-17 DIAGNOSIS — Z79.4 TYPE 2 DIABETES MELLITUS WITH HYPERGLYCEMIA, WITH LONG-TERM CURRENT USE OF INSULIN (HCC): Primary | ICD-10-CM

## 2019-10-17 DIAGNOSIS — E11.41 DIABETIC MONONEUROPATHY ASSOCIATED WITH TYPE 2 DIABETES MELLITUS (HCC): Primary | ICD-10-CM

## 2019-10-17 DIAGNOSIS — I25.10 CORONARY ARTERY DISEASE INVOLVING NATIVE CORONARY ARTERY OF NATIVE HEART WITHOUT ANGINA PECTORIS: ICD-10-CM

## 2019-10-17 DIAGNOSIS — E66.01 SEVERE OBESITY (HCC): ICD-10-CM

## 2019-10-17 LAB — HBA1C MFR BLD HPLC: 8.2 %

## 2019-10-17 RX ORDER — LISINOPRIL 20 MG/1
TABLET ORAL
Qty: 30 TAB | Refills: 1 | Status: SHIPPED | OUTPATIENT
Start: 2019-10-17 | End: 2019-12-16 | Stop reason: SDUPTHER

## 2019-10-17 RX ORDER — INSULIN PUMP SYRINGE, 3 ML
EACH MISCELLANEOUS
Qty: 1 KIT | Refills: 0 | Status: SHIPPED | OUTPATIENT
Start: 2019-10-17

## 2019-10-17 RX ORDER — LANCETS
EACH MISCELLANEOUS
Qty: 200 EACH | Refills: 3 | Status: SHIPPED | OUTPATIENT
Start: 2019-10-17 | End: 2022-05-31 | Stop reason: SDUPTHER

## 2019-10-17 RX ORDER — ROSUVASTATIN CALCIUM 20 MG/1
TABLET, COATED ORAL
Qty: 90 TAB | Refills: 3 | Status: SHIPPED | OUTPATIENT
Start: 2019-10-17 | End: 2020-10-14

## 2019-10-17 NOTE — PROGRESS NOTES
Chief Complaint   Patient presents with   [address]     Patient presents in office today for f/u and labs. No concerns. 1. Have you been to the ER, urgent care clinic since your last visit? Hospitalized since your last visit? No    2. Have you seen or consulted any other health care providers outside of the [address] [place] [address] since your last visit? Include any pap smears or colon screening.  No    Learning Assessment 3/29/2018   PRIMARY LEARNER Patient   HIGHEST LEVEL OF EDUCATION - PRIMARY LEARNER  GRADUATED HIGH SCHOOL OR GED   BARRIERS PRIMARY LEARNER NONE   CO-LEARNER CAREGIVER No   PRIMARY LANGUAGE ENGLISH   LEARNER PREFERENCE PRIMARY DEMONSTRATION   ANSWERED BY Patient   RELATIONSHIP SELF

## 2019-10-17 NOTE — PATIENT INSTRUCTIONS
A Healthy Lifestyle: Care Instructions  Your Care Instructions    A healthy lifestyle can help you feel good, stay at a healthy weight, and have plenty of energy for both work and play. A healthy lifestyle is something you can share with your whole family. A healthy lifestyle also can lower your risk for serious health problems, such as high blood pressure, heart disease, and diabetes. You can follow a few steps listed below to improve your health and the health of your family. Follow-up care is a key part of your treatment and safety. Be sure to make and go to all appointments, and call your doctor if you are having problems. It's also a good idea to know your test results and keep a list of the medicines you take. How can you care for yourself at home? · Do not eat too much sugar, fat, or fast foods. You can still have dessert and treats now and then. The goal is moderation. · Start small to improve your eating habits. Pay attention to portion sizes, drink less juice and soda pop, and eat more fruits and vegetables. ? Eat a healthy amount of food. A 3-ounce serving of meat, for example, is about the size of a deck of cards. Fill the rest of your plate with vegetables and whole grains. ? Limit the amount of soda and sports drinks you have every day. Drink more water when you are thirsty. ? Eat at least 5 servings of fruits and vegetables every day. It may seem like a lot, but it is not hard to reach this goal. A serving or helping is 1 piece of fruit, 1 cup of vegetables, or 2 cups of leafy, raw vegetables. Have an apple or some carrot sticks as an afternoon snack instead of a candy bar. Try to have fruits and/or vegetables at every meal.  · Make exercise part of your daily routine. You may want to start with simple activities, such as walking, bicycling, or slow swimming. Try to be active 30 to 60 minutes every day. You do not need to do all 30 to 60 minutes all at once.  For example, you can exercise 3 times a day for 10 or 20 minutes. Moderate exercise is safe for most people, but it is always a good idea to talk to your doctor before starting an exercise program.  · Keep moving. Elva Locker the lawn, work in the garden, or [employer]. Take the stairs instead of the elevator at work. · If you smoke, quit. People who smoke have an increased risk for heart attack, stroke, cancer, and other lung illnesses. Quitting is hard, but there are ways to boost your chance of quitting tobacco for good. ? Use nicotine gum, patches, or lozenges. ? Ask your doctor about stop-smoking programs and medicines. ? Keep trying. In addition to reducing your risk of diseases in the future, you will notice some benefits soon after you stop using tobacco. If you have shortness of breath or asthma symptoms, they will likely get better within a few weeks after you quit. · Limit how much alcohol you drink. Moderate amounts of alcohol (up to 2 drinks a day for men, 1 drink a day for women) are okay. But drinking too much can lead to liver problems, high blood pressure, and other health problems. Family health  If you have a family, there are many things you can do together to improve your health. · Eat meals together as a family as often as possible. · Eat healthy foods. This includes fruits, vegetables, lean meats and dairy, and whole grains. · Include your family in your fitness plan. Most people think of activities such as jogging or tennis as the way to fitness, but there are many ways you and your family can be more active. Anything that makes you breathe hard and gets your heart pumping is exercise. Here are some tips:  ? Walk to do errands or to take your child to school or the bus.  ? Go for a family bike ride after dinner instead of watching TV. Where can you learn more? Go to http://lauro-oksana.info/. Enter L963 in the search box to learn more about \"A Healthy Lifestyle: Care Instructions. \"  Current as of: May 28, 2019  Content Version: 12.2  © 8195-4593 [employer]. Care instructions adapted under license by [employer] (which disclaims liability or warranty for this information). If you have questions about a medical condition or this instruction, always ask your healthcare professional. [address] any warranty or liability for your use of this information.

## 2019-10-17 NOTE — PROGRESS NOTES
Joy Rodriguez is a 62 y.o. female   Chief Complaint   Patient presents with   [address]    pt here for follow up and states she takes the night time insulin every night but forgets about the short acting. Last took her short acting a few days ago once and does not know the time before that. Pt is taking the 30u on long acting nightly. Pt requesting refill of crestor states she ran out. Diet has not changed last A1C was 8.1. WIll cherck in office. BP is uncontrtolled on meddsa nd states she had it checked at an event recently and was very high then too. she is a 62y.o. year old female who presents for evalution. Reviewed PmHx, RxHx, FmHx, SocHx, AllgHx and updated and dated in the chart. Review of Systems - negative except as listed above in the HPI    Objective:     Vitals:    10/17/19 1013   BP: (!) 197/97   Pulse: 86   Resp: 16   Temp: 98 °F (36.7 °C)   TempSrc: Oral   SpO2: 93%   Weight: 199 lb (90.3 kg)   Height: 5' 2\" (1.575 m)       Current Outpatient Medications   Medication Sig    rosuvastatin (CRESTOR) 20 mg tablet TAKE 1 TABLET BY MOUTH EVERY NIGHT    lisinopril (PRINIVIL, ZESTRIL) 20 mg tablet TAKE 1 TABLET BY MOUTH EVERY DAY    insulin degludec-liraglutide (XULTOPHY 100/3.6) 100 unit-3.6 mg /mL (3 mL) inpn 16u subq qday x 1 week then increase by 4u weekly until at 32u then 32 daily    Blood-Glucose Meter monitoring kit Check BG bid on insulin E11.65    lancets misc Check BG bid on insulin E11.65    glucose blood VI test strips (BLOOD GLUCOSE TEST) strip Check BG bid on insulin E11.65    Insulin Needles, Disposable, (YOMI PEN NEEDLE) 32 gauge x 5/32\" ndle USE DAILY WITH TRESIBA    gabapentin (NEURONTIN) 300 mg capsule Take 1 Cap by mouth three (3) times daily.  Max Daily Amount: 900 mg.    dilTIAZem CD (CARTIA XT) 240 mg ER capsule TAKE 1 CAPSULE BY MOUTH DAILY    albuterol (PROVENTIL HFA, VENTOLIN HFA, PROAIR HFA) 90 mcg/actuation inhaler Take 2 Puffs by inhalation every four (4) hours as needed for Wheezing.  umeclidinium (INCRUSE ELLIPTA) 62.5 mcg/actuation inhaler Take 1 Puff by inhalation daily.  insulin degludec (TRESIBA FLEXTOUCH U-100) 100 unit/mL (3 mL) inpn INJECT 30 UNITS UNDER THE SKIN NIGHTLY    triamcinolone acetonide (KENALOG) 0.1 % topical cream Apply  to affected area two (2) times a day. use thin layer    XARELTO 20 mg tab tablet TAKE 1 TABLET BY MOUTH EVERY DAY WITH DINNER    nitroglycerin (NITROSTAT) 0.4 mg SL tablet 1 Tab by SubLINGual route every five (5) minutes as needed for Chest Pain.  albuterol (PROVENTIL VENTOLIN) 2.5 mg /3 mL (0.083 %) nebulizer solution USE 1 VIAL VIA NEBULIZER Q 4 H PRF WHEEZING    ondansetron (ZOFRAN ODT) 8 mg disintegrating tablet Take 1 Tab by mouth every eight (8) hours as needed for Nausea.  mupirocin (BACTROBAN) 2 % ointment Apply  to affected area daily.  ADVAIR DISKUS 250-50 mcg/dose diskus inhaler INHALE 1 PUFF BY MOUTH TWICE DAILY    aspirin delayed-release 81 mg tablet Take  by mouth daily.  naproxen sodium (ALEVE) 220 mg cap Take  by mouth daily. Indications: for pain    Insulin Needles, Disposable, 31 gauge x 8/88\" ndle For application of insulin.  Insulin Syringes, Disposable, 1 mL syrg 1 Syringe by Does Not Apply route two (2) times a day. Before breakfast and 12 hours later.  benzonatate (TESSALON) 100 mg capsule TAKE 1 CAPSULE BY MOUTH THREE TIMES DAILY AS NEEDED FOR COUGH     No current facility-administered medications for this visit. Physical Examination: General appearance - alert, well appearing, and in no distress  Chest - clear to auscultation, no wheezes, rales or rhonchi, symmetric air entry  Heart - normal rate, regular rhythm, normal S1, S2, no murmurs, rubs, clicks or gallops      Assessment/ Plan:   Diagnoses and all orders for this visit:    1.  Type 2 diabetes mellitus with hyperglycemia, with long-term current use of insulin (HCC)  -     AMB POC HEMOGLOBIN A1C  - insulin degludec-liraglutide (XULTOPHY 100/3.6) 100 unit-3.6 mg /mL (3 mL) inpn; 16u subq qday x 1 week then increase by 4u weekly until at 32u then 32 daily  -     Blood-Glucose Meter monitoring kit; Check BG bid on insulin E11.65  -     lancets misc; Check BG bid on insulin E11.65  -     glucose blood VI test strips (BLOOD GLUCOSE TEST) strip; Check BG bid on insulin E11.65  Pt to keep glucose log and f/u with this in 1 month, given book for logging. 2. Essential hypertension  -     lisinopril (PRINIVIL, ZESTRIL) 20 mg tablet; TAKE 1 TABLET BY MOUTH EVERY DAY    3. Coronary artery disease involving native coronary artery of native heart without angina pectoris  -     rosuvastatin (CRESTOR) 20 mg tablet; TAKE 1 TABLET BY MOUTH EVERY NIGHT  Restart and check with next set of fasting labs in 3 months  4. Encounter for immunization  -     INFLUENZA VIRUS VAC QUAD,SPLIT,PRESV FREE SYRINGE IM  -     ADMIN INFLUENZA VIRUS VAC    5. Severe obesity ([facility] [address]  Start xultophy     Follow-up and Dispositions    · Return in about 4 weeks (around 11/14/2019), or if symptoms worsen or fail to improve. I have discussed the diagnosis with the patient and the intended plan as seen in the above orders. The patient has received an after-visit summary and questions were answered concerning future plans. Pt conveyed understanding of plan.     Medication Side Effects and Warnings were discussed with patient      Nicky Medina DO

## 2019-10-18 RX ORDER — GABAPENTIN 600 MG/1
TABLET ORAL
Qty: 270 TAB | Refills: 1 | OUTPATIENT
Start: 2019-10-18 | End: 2020-04-29

## 2019-10-18 RX ORDER — TRIAMCINOLONE ACETONIDE 1 MG/G
CREAM TOPICAL
Qty: 45 G | Refills: 0 | Status: SHIPPED | OUTPATIENT
Start: 2019-10-18 | End: 2020-02-13 | Stop reason: SDUPTHER

## 2019-10-21 ENCOUNTER — PATIENT OUTREACH (OUTPATIENT)
Dept: FAMILY MEDICINE CLINIC | Age: 59
End: 2019-10-21

## 2019-10-21 NOTE — PROGRESS NOTES
Goals      Skills and education necessary to properly manage diabetes      10/7/19 -   A1C 8.1 5/29/19  Current level of understanding: Poor understanding of disease and management with monitoring blood sugars and taking insulin. Reports checking blood sugars maybe a few times a week. Only taking Novolog a few times a week. Reports 100% compliance with long acting insulin. Long acting insulin was increased to 30 units and short acting increased to 12 units by Dr. Munir Meneses in May. Patient due to repeat A1C. Desired Outcome: Patient's will verbalize knowledge of disease process, medical, and nutrition management. Patient will test blood sugars regularly and take medications as prescribed. Patient will follow recommended diet plan. Plan: ACM booked appointment for repeat A1C on 10/17/19. Patient will attend appointment. Patient commits to checking fasting blood sugar once a day and keeping log. Patient will discuss log with ACM and bring to appointment. Patient concerned that she does not eat 3 meals a day, usually only has 2 meals. Patient will take Novolog as prescribed (12 units with meals, even if only 2 meals). Discussed taking Novolog at start of meal to avoid hypoglycemia. Reviewed s/s of hypoglycemia and how to manage low blood sugar. 10/21/19  Assessment: Patient attended appointment with Dr. Munir Meneses on 10/17. Patient started on Xultophy and advised to check blood sugars BID and keep log. Patient to follow-up in 1 month and bring log to appointment. Patient reporting blood sugars running 300-400. Plan: Reviewed dosing instructions of xultophy. Patient taking as prescribed. Patient to continue monitoring blood sugars. ACM will clarify with Dr. Munir Meneses if patient is to continue short acting insulin, patient is currently not taking. Message sent to Dr. Munir Meneses and will return call to patient with instructions.

## 2019-10-21 NOTE — Clinical Note
Patient is taking xultophy as prescribed, currently 16 units. Is she supposed to still be using the novolog also? Her blood sugars are running 300-400.

## 2019-10-23 ENCOUNTER — DOCUMENTATION ONLY (OUTPATIENT)
Dept: FAMILY MEDICINE CLINIC | Age: 59
End: 2019-10-23

## 2019-10-23 ENCOUNTER — PATIENT OUTREACH (OUTPATIENT)
Dept: FAMILY MEDICINE CLINIC | Age: 59
End: 2019-10-23

## 2019-10-23 NOTE — PROGRESS NOTES
Goals      Skills and education necessary to properly manage diabetes      10/7/19 -   A1C 8.1 5/29/19  Current level of understanding: Poor understanding of disease and management with monitoring blood sugars and taking insulin. Reports checking blood sugars maybe a few times a week. Only taking Novolog a few times a week. Reports 100% compliance with long acting insulin. Long acting insulin was increased to 30 units and short acting increased to 12 units by Dr. Mikaela Wren in May. Patient due to repeat A1C. Desired Outcome: Patient's will verbalize knowledge of disease process, medical, and nutrition management. Patient will test blood sugars regularly and take medications as prescribed. Patient will follow recommended diet plan. Plan: ACM booked appointment for repeat A1C on 10/17/19. Patient will attend appointment. Patient commits to checking fasting blood sugar once a day and keeping log. Patient will discuss log with ACM and bring to appointment. Patient concerned that she does not eat 3 meals a day, usually only has 2 meals. Patient will take Novolog as prescribed (12 units with meals, even if only 2 meals). Discussed taking Novolog at start of meal to avoid hypoglycemia. Reviewed s/s of hypoglycemia and how to manage low blood sugar. 10/21/19  Assessment: Patient attended appointment with Dr. Mikaela Wren on 10/17. Patient started on Xultophy and advised to check blood sugars BID and keep log. Patient to follow-up in 1 month and bring log to appointment. Patient reporting blood sugars running 300-400. Plan: Reviewed dosing instructions of xultophy. Patient taking as prescribed. Patient to continue monitoring blood sugars. ACM will clarify with Dr. Mikaela Wren if patient is to continue short acting insulin, patient is currently not taking. Message sent to Dr. Mikaela Wren and will return call to patient with instructions. 10/23/19  Received clarification from Dr. Mikaela Wren. Patient should also be using short acting insulin. Attempted to contact patient to discuss. No voicemail set up.   10/24/19  Plan: Spoke with patient and reviewed instructions from Dr. Mariusz Pearl. Patient will inject 12 units Novolog before meals and continue titrating up on xultophy as prescribed. Went over dosing instructions. Patient will monitor blood sugars BID and discuss with ACM at next call.

## 2019-10-31 ENCOUNTER — DOCUMENTATION ONLY (OUTPATIENT)
Dept: FAMILY MEDICINE CLINIC | Age: 59
End: 2019-10-31

## 2019-11-07 ENCOUNTER — PATIENT OUTREACH (OUTPATIENT)
Dept: FAMILY MEDICINE CLINIC | Age: 59
End: 2019-11-07

## 2019-11-21 ENCOUNTER — DOCUMENTATION ONLY (OUTPATIENT)
Dept: FAMILY MEDICINE CLINIC | Age: 59
End: 2019-11-21

## 2019-11-21 NOTE — PROGRESS NOTES
[facility] Rx zenest was put on AERL Benson's desk to process

## 2019-11-22 ENCOUNTER — DOCUMENTATION ONLY (OUTPATIENT)
Dept: FAMILY MEDICINE CLINIC | Age: 59
End: 2019-11-22

## 2019-11-22 ENCOUNTER — PATIENT OUTREACH (OUTPATIENT)
Dept: FAMILY MEDICINE CLINIC | Age: 59
End: 2019-11-22

## 2019-11-22 NOTE — Clinical Note
Emilee See! Are you covering [address] [place] [address]? This is is patient. She's on xultophy - titrated up to 32 units daily. And also was on Novolog 12 units BID. Says she stopped taking the novolog now that she's on the max dose of xultophy because it was making her very shaky when she took it. Sugars are running 160-200 without any Novolog. I'm thinking she should still be on some though? Thoughts? Should she try 6 units? More, less, none? I don't think she would be compliant with a sliding scale, I'm not actually sure she's testing faithfully now, although she says she is. Ovidio. Thanks for your help!

## 2019-11-25 ENCOUNTER — DOCUMENTATION ONLY (OUTPATIENT)
Dept: FAMILY MEDICINE CLINIC | Age: 59
End: 2019-11-25

## 2019-11-25 NOTE — PROGRESS NOTES
[facility] medical records request was faxed to [facility] at [phone] to be processed.

## 2019-11-26 NOTE — PROGRESS NOTES
Goals      Skills and education necessary to properly manage diabetes      10/7/19 -   A1C 8.1 5/29/19  Current level of understanding: Poor understanding of disease and management with monitoring blood sugars and taking insulin. Reports checking blood sugars maybe a few times a week. Only taking Novolog a few times a week. Reports 100% compliance with long acting insulin. Long acting insulin was increased to 30 units and short acting increased to 12 units by Dr. Anoop Herman in May. Patient due to repeat A1C. Desired Outcome: Patient's will verbalize knowledge of disease process, medical, and nutrition management. Patient will test blood sugars regularly and take medications as prescribed. Patient will follow recommended diet plan. Plan: ACM booked appointment for repeat A1C on 10/17/19. Patient will attend appointment. Patient commits to checking fasting blood sugar once a day and keeping log. Patient will discuss log with ACM and bring to appointment. Patient concerned that she does not eat 3 meals a day, usually only has 2 meals. Patient will take Novolog as prescribed (12 units with meals, even if only 2 meals). Discussed taking Novolog at start of meal to avoid hypoglycemia. Reviewed s/s of hypoglycemia and how to manage low blood sugar. 10/21/19  Assessment: Patient attended appointment with Dr. Anoop Herman on 10/17. Patient started on Xultophy and advised to check blood sugars BID and keep log. Patient to follow-up in 1 month and bring log to appointment. Patient reporting blood sugars running 300-400. Plan: Reviewed dosing instructions of xultophy. Patient taking as prescribed. Patient to continue monitoring blood sugars. ACM will clarify with Dr. Anoop Herman if patient is to continue short acting insulin, patient is currently not taking. Message sent to Dr. Anoop Herman and will return call to patient with instructions. 10/23/19  Received clarification from Dr. Anoop Herman. Patient should also be using short acting insulin. Attempted to contact patient to discuss. No voicemail set up.   10/24/19  Plan: Spoke with patient and reviewed instructions from Dr. Elizabeth Romero. Patient will inject 12 units Novolog before meals and continue titrating up on xultophy as prescribed. Went over dosing instructions. Patient will monitor blood sugars BID and discuss with ACM at next call. 11/26/19  Assessment: Patient now at max dose of xultophy, 32 units BID. Reports that blood sugars are running 160-200 and she is testing BID. States that the 12 units of novolog before meals makes her feel shaky, so she stopped taking. Discussed that she likely needs to cut back on the novolog now that blood sugars are stabilizing with xultophy. Plan: ACM will send message to Dr. Andrew Miles and return call to the patient with instructions.

## 2019-11-27 ENCOUNTER — PATIENT OUTREACH (OUTPATIENT)
Dept: FAMILY MEDICINE CLINIC | Age: 59
End: 2019-11-27

## 2019-11-27 NOTE — PROGRESS NOTES
Goals      Skills and education necessary to properly manage diabetes      10/7/19 -   A1C 8.1 5/29/19  Current level of understanding: Poor understanding of disease and management with monitoring blood sugars and taking insulin. Reports checking blood sugars maybe a few times a week. Only taking Novolog a few times a week. Reports 100% compliance with long acting insulin. Long acting insulin was increased to 30 units and short acting increased to 12 units by Dr. Tracy Myrick in May. Patient due to repeat A1C. Desired Outcome: Patient's will verbalize knowledge of disease process, medical, and nutrition management. Patient will test blood sugars regularly and take medications as prescribed. Patient will follow recommended diet plan. Plan: ACM booked appointment for repeat A1C on 10/17/19. Patient will attend appointment. Patient commits to checking fasting blood sugar once a day and keeping log. Patient will discuss log with ACM and bring to appointment. Patient concerned that she does not eat 3 meals a day, usually only has 2 meals. Patient will take Novolog as prescribed (12 units with meals, even if only 2 meals). Discussed taking Novolog at start of meal to avoid hypoglycemia. Reviewed s/s of hypoglycemia and how to manage low blood sugar. 10/21/19  Assessment: Patient attended appointment with Dr. Tracy Myrick on 10/17. Patient started on Xultophy and advised to check blood sugars BID and keep log. Patient to follow-up in 1 month and bring log to appointment. Patient reporting blood sugars running 300-400. Plan: Reviewed dosing instructions of xultophy. Patient taking as prescribed. Patient to continue monitoring blood sugars. ACM will clarify with Dr. Tracy Myrick if patient is to continue short acting insulin, patient is currently not taking. Message sent to Dr. Tracy Myrick and will return call to patient with instructions. 10/23/19  Received clarification from Dr. Tracy Myrick. Patient should also be using short acting insulin. Attempted to contact patient to discuss. No voicemail set up.   10/24/19  Plan: Spoke with patient and reviewed instructions from Dr. Lora Sutherland. Patient will inject 12 units Novolog before meals and continue titrating up on xultophy as prescribed. Went over dosing instructions. Patient will monitor blood sugars BID and discuss with ACM at next call. 11/26/19  Assessment: Patient now at max dose of xultophy, 32 units BID. Reports that blood sugars are running 160-200 and she is testing BID. States that the 12 units of novolog before meals makes her feel shaky, so she stopped taking. Discussed that she likely needs to cut back on the novolog now that blood sugars are stabilizing with xultophy. Plan: ACM will send message to Dr. Olam Ahumada and return call to the patient with instructions. 11/27/19  Received instructions for patient to decrease novolog to 6 units. Patient made aware. Patient will continue testing BID and taking all medications as prescribed.

## 2019-12-03 ENCOUNTER — DOCUMENTATION ONLY (OUTPATIENT)
Dept: FAMILY MEDICINE CLINIC | Age: 59
End: 2019-12-03

## 2019-12-03 ENCOUNTER — TELEPHONE (OUTPATIENT)
Dept: FAMILY MEDICINE CLINIC | Age: 59
End: 2019-12-03

## 2019-12-03 RX ORDER — ALLOPURINOL 100 MG/1
100 TABLET ORAL
Qty: 30 TAB | Refills: 1 | Status: SHIPPED | OUTPATIENT
Start: 2019-12-03 | End: 2020-01-26

## 2019-12-03 NOTE — TELEPHONE ENCOUNTER
Spoke with pt id x3 pt states that she has had this issue before, she states that she normally goes to the ER. Pt not sure of the med.

## 2019-12-03 NOTE — PROGRESS NOTES
[facility] medical records request was faxed to [facility] at [phone] to be processed on 12/03/19.

## 2019-12-16 DIAGNOSIS — I10 ESSENTIAL HYPERTENSION: ICD-10-CM

## 2019-12-18 RX ORDER — LISINOPRIL 20 MG/1
TABLET ORAL
Qty: 30 TAB | Refills: 0 | Status: SHIPPED | OUTPATIENT
Start: 2019-12-18 | End: 2020-01-15

## 2019-12-19 RX ORDER — RIVAROXABAN 20 MG/1
TABLET, FILM COATED ORAL
Qty: 30 TAB | Refills: 11 | Status: SHIPPED | OUTPATIENT
Start: 2019-12-19 | End: 2021-01-12 | Stop reason: SDUPTHER

## 2020-01-06 DIAGNOSIS — E11.65 TYPE 2 DIABETES MELLITUS WITH HYPERGLYCEMIA, WITH LONG-TERM CURRENT USE OF INSULIN (HCC): ICD-10-CM

## 2020-01-06 DIAGNOSIS — Z79.4 TYPE 2 DIABETES MELLITUS WITH HYPERGLYCEMIA, WITH LONG-TERM CURRENT USE OF INSULIN (HCC): ICD-10-CM

## 2020-01-08 ENCOUNTER — PATIENT OUTREACH (OUTPATIENT)
Dept: FAMILY MEDICINE CLINIC | Age: 60
End: 2020-01-08

## 2020-01-09 ENCOUNTER — DOCUMENTATION ONLY (OUTPATIENT)
Dept: FAMILY MEDICINE CLINIC | Age: 60
End: 2020-01-09

## 2020-01-15 DIAGNOSIS — I10 ESSENTIAL HYPERTENSION: ICD-10-CM

## 2020-01-15 RX ORDER — LISINOPRIL 20 MG/1
TABLET ORAL
Qty: 30 TAB | Refills: 0 | Status: SHIPPED | OUTPATIENT
Start: 2020-01-15 | End: 2020-02-13

## 2020-01-17 ENCOUNTER — TELEPHONE (OUTPATIENT)
Dept: FAMILY MEDICINE CLINIC | Age: 60
End: 2020-01-17

## 2020-01-17 NOTE — TELEPHONE ENCOUNTER
Eleuterio Toney from [facility] called and she needs a PA for [employer]. Please call them back at [phone].  Reference number is [id number]

## 2020-01-24 ENCOUNTER — PATIENT OUTREACH (OUTPATIENT)
Dept: FAMILY MEDICINE CLINIC | Age: 60
End: 2020-01-24

## 2020-01-26 RX ORDER — ALLOPURINOL 100 MG/1
TABLET ORAL
Qty: 30 TAB | Refills: 1 | Status: SHIPPED | OUTPATIENT
Start: 2020-01-26 | End: 2020-03-30

## 2020-01-27 ENCOUNTER — TELEPHONE (OUTPATIENT)
Dept: FAMILY MEDICINE CLINIC | Age: 60
End: 2020-01-27

## 2020-01-27 NOTE — PROGRESS NOTES
Goals      Skills and education necessary to properly manage diabetes      10/7/19 -   A1C 8.1 5/29/19  Current level of understanding: Poor understanding of disease and management with monitoring blood sugars and taking insulin. Reports checking blood sugars maybe a few times a week. Only taking Novolog a few times a week. Reports 100% compliance with long acting insulin. Long acting insulin was increased to 30 units and short acting increased to 12 units by Dr. Carlee Almodovar in May. Patient due to repeat A1C. Desired Outcome: Patient's will verbalize knowledge of disease process, medical, and nutrition management. Patient will test blood sugars regularly and take medications as prescribed. Patient will follow recommended diet plan. Plan: ACM booked appointment for repeat A1C on 10/17/19. Patient will attend appointment. Patient commits to checking fasting blood sugar once a day and keeping log. Patient will discuss log with ACM and bring to appointment. Patient concerned that she does not eat 3 meals a day, usually only has 2 meals. Patient will take Novolog as prescribed (12 units with meals, even if only 2 meals). Discussed taking Novolog at start of meal to avoid hypoglycemia. Reviewed s/s of hypoglycemia and how to manage low blood sugar. 10/21/19  Assessment: Patient attended appointment with Dr. Carlee Almodovar on 10/17. Patient started on Xultophy and advised to check blood sugars BID and keep log. Patient to follow-up in 1 month and bring log to appointment. Patient reporting blood sugars running 300-400. Plan: Reviewed dosing instructions of xultophy. Patient taking as prescribed. Patient to continue monitoring blood sugars. ACM will clarify with Dr. Carlee Almodovar if patient is to continue short acting insulin, patient is currently not taking. Message sent to Dr. Carlee Almodovar and will return call to patient with instructions. 10/23/19  Received clarification from Dr. Carlee Almodovar. Patient should also be using short acting insulin. Attempted to contact patient to discuss. No voicemail set up.   10/24/19  Plan: Spoke with patient and reviewed instructions from Dr. Bruna Bosworth. Patient will inject 12 units Novolog before meals and continue titrating up on xultophy as prescribed. Went over dosing instructions. Patient will monitor blood sugars BID and discuss with ACM at next call. 11/26/19  Assessment: Patient now at max dose of xultophy, 32 units daily. Reports that blood sugars are running 160-200 and she is testing BID. States that the 12 units of novolog before meals makes her feel shaky, so she stopped taking. Discussed that she likely needs to cut back on the novolog now that blood sugars are stabilizing with xultophy. Plan: ACM will send message to Dr. Puentes Courser and return call to the patient with instructions. 11/27/19  Received instructions for patient to decrease novolog to 6 units. Patient made aware. Patient will continue testing BID and taking all medications as prescribed. 1/27/20  Assessment: Patient reports compliance with xultophy, however, is not testing blood sugars. And is only taking novololg when she \"feels\" like her sugar is high. States that this is rarely. Has no explanation as to why she stopped testing. Last A1c 10/17/19. Plan: Scheduled appointment for diabetes check and repeat A1c. Patient also interested in [facility] and feels that this would help her be more compliant with testing. ACM will follow-up with patient after A1c results received.

## 2020-02-13 ENCOUNTER — OFFICE VISIT (OUTPATIENT)
Dept: FAMILY MEDICINE CLINIC | Age: 60
End: 2020-02-13

## 2020-02-13 ENCOUNTER — HOSPITAL ENCOUNTER (OUTPATIENT)
Dept: LAB | Age: 60
Discharge: HOME | End: 2020-02-13

## 2020-02-13 VITALS
DIASTOLIC BLOOD PRESSURE: 77 MMHG | TEMPERATURE: 98 F | HEIGHT: 62 IN | BODY MASS INDEX: 35.51 KG/M2 | SYSTOLIC BLOOD PRESSURE: 146 MMHG | WEIGHT: 193 LBS | OXYGEN SATURATION: 92 % | HEART RATE: 92 BPM | RESPIRATION RATE: 18 BRPM

## 2020-02-13 DIAGNOSIS — E78.00 HYPERCHOLESTEREMIA: ICD-10-CM

## 2020-02-13 DIAGNOSIS — L40.3 PUSTULAR PSORIASIS OF PALMS AND SOLES: ICD-10-CM

## 2020-02-13 DIAGNOSIS — I10 ESSENTIAL HYPERTENSION: ICD-10-CM

## 2020-02-13 DIAGNOSIS — Z72.0 TOBACCO ABUSE: ICD-10-CM

## 2020-02-13 DIAGNOSIS — L82.1 SEBORRHEIC KERATOSES: ICD-10-CM

## 2020-02-13 DIAGNOSIS — Z79.4 TYPE 2 DIABETES MELLITUS WITH HYPERGLYCEMIA, WITH LONG-TERM CURRENT USE OF INSULIN (HCC): Primary | ICD-10-CM

## 2020-02-13 DIAGNOSIS — Z79.4 TYPE 2 DIABETES MELLITUS WITH HYPERGLYCEMIA, WITH LONG-TERM CURRENT USE OF INSULIN (HCC): ICD-10-CM

## 2020-02-13 DIAGNOSIS — Z12.39 SCREENING FOR MALIGNANT NEOPLASM OF BREAST: ICD-10-CM

## 2020-02-13 DIAGNOSIS — J42 CHRONIC BRONCHITIS, UNSPECIFIED CHRONIC BRONCHITIS TYPE (HCC): ICD-10-CM

## 2020-02-13 DIAGNOSIS — E11.65 TYPE 2 DIABETES MELLITUS WITH HYPERGLYCEMIA, WITH LONG-TERM CURRENT USE OF INSULIN (HCC): Primary | ICD-10-CM

## 2020-02-13 DIAGNOSIS — E11.65 TYPE 2 DIABETES MELLITUS WITH HYPERGLYCEMIA, WITH LONG-TERM CURRENT USE OF INSULIN (HCC): ICD-10-CM

## 2020-02-13 DIAGNOSIS — Z12.31 ENCOUNTER FOR SCREENING MAMMOGRAM FOR MALIGNANT NEOPLASM OF BREAST: ICD-10-CM

## 2020-02-13 LAB
ALBUMIN SERPL-MCNC: 3.8 G/DL (ref 3.5–5)
ALBUMIN/GLOB SERPL: 1 {RATIO} (ref 1.1–2.2)
ALP SERPL-CCNC: 106 U/L (ref 45–117)
ALT SERPL-CCNC: 25 U/L (ref 12–78)
ANION GAP SERPL CALC-SCNC: 5 MMOL/L (ref 5–15)
AST SERPL-CCNC: 25 U/L (ref 15–37)
BILIRUB SERPL-MCNC: 0.2 MG/DL (ref 0.2–1)
BUN SERPL-MCNC: 10 MG/DL (ref 6–20)
BUN/CREAT SERPL: 13 (ref 12–20)
CALCIUM SERPL-MCNC: 9.4 MG/DL (ref 8.5–10.1)
CHLORIDE SERPL-SCNC: 109 MMOL/L (ref 97–108)
CHOLEST SERPL-MCNC: 140 MG/DL
CO2 SERPL-SCNC: 27 MMOL/L (ref 21–32)
CREAT SERPL-MCNC: 0.79 MG/DL (ref 0.55–1.02)
EST. AVERAGE GLUCOSE BLD GHB EST-MCNC: 154 MG/DL
GLOBULIN SER CALC-MCNC: 3.8 G/DL (ref 2–4)
GLUCOSE SERPL-MCNC: 135 MG/DL (ref 65–100)
HBA1C MFR BLD: 7 % (ref 4–5.6)
HDLC SERPL-MCNC: 37 MG/DL
HDLC SERPL: 3.8 {RATIO} (ref 0–5)
LDLC SERPL CALC-MCNC: 60.2 MG/DL (ref 0–100)
LIPID PROFILE,FLP: ABNORMAL
POTASSIUM SERPL-SCNC: 4.3 MMOL/L (ref 3.5–5.1)
PROT SERPL-MCNC: 7.6 G/DL (ref 6.4–8.2)
SODIUM SERPL-SCNC: 141 MMOL/L (ref 136–145)
TRIGL SERPL-MCNC: 214 MG/DL (ref ?–150)
VLDLC SERPL CALC-MCNC: 42.8 MG/DL

## 2020-02-13 RX ORDER — FLUTICASONE PROPIONATE AND SALMETEROL 250; 50 UG/1; UG/1
POWDER RESPIRATORY (INHALATION)
Qty: 3 EACH | Refills: 3 | Status: SHIPPED | OUTPATIENT
Start: 2020-02-13 | End: 2021-02-11 | Stop reason: SDUPTHER

## 2020-02-13 RX ORDER — LISINOPRIL 30 MG/1
30 TABLET ORAL DAILY
Qty: 90 TAB | Refills: 0 | Status: SHIPPED | OUTPATIENT
Start: 2020-02-13 | End: 2020-05-12 | Stop reason: SDUPTHER

## 2020-02-13 RX ORDER — TRIAMCINOLONE ACETONIDE 1 MG/G
CREAM TOPICAL
Qty: 45 G | Refills: 1 | Status: SHIPPED | OUTPATIENT
Start: 2020-02-13 | End: 2020-09-17 | Stop reason: SDUPTHER

## 2020-02-13 NOTE — PROGRESS NOTES
Chief Complaint   Patient presents with    Follow-up    Labs    Neck Pain     Patient presents in office today for f/u and labs. Has c/o neck pain off and on for the last week. Not treating with anything. No other concerns. 1. Have you been to the ER, urgent care clinic since your last visit? Hospitalized since your last visit? No    2. Have you seen or consulted any other health care providers outside of the [address] [place] [address] since your last visit? Include any pap smears or colon screening.  No    Learning Assessment 3/29/2018   PRIMARY LEARNER Patient   HIGHEST LEVEL OF EDUCATION - PRIMARY LEARNER  GRADUATED HIGH SCHOOL OR GED   BARRIERS PRIMARY LEARNER NONE   CO-LEARNER CAREGIVER No   PRIMARY LANGUAGE ENGLISH   LEARNER PREFERENCE PRIMARY DEMONSTRATION   ANSWERED BY Patient   RELATIONSHIP SELF

## 2020-02-13 NOTE — PROGRESS NOTES
Ira Blount is a 61 y.o. female   Chief Complaint   Patient presents with    Follow-up    Labs    Neck Pain    pt here for diabetes follow up and states her diet is not where it should be and glucose has been high, 230's fasting this morning. Taking crestor for cho [place] she is fasting today. On lisinopril for her BP but BP has been elevated will increase to 30 from 20. Tolerates both meds no side effects. Overdue for hayley. Reports lesion on L shoulder over 6 months, does not bother her not changing colors. Does need refill for palmar psoriasis and this works well when needed, kenalog. Pt not interested in quitting smoking and discussed health effects and has copd needs refill of inhaler. she is a 61y.o. year old female who presents for evalution. Reviewed PmHx, RxHx, FmHx, SocHx, AllgHx and updated and dated in the chart. Review of Systems - negative except as listed above in the HPI    Objective:     Vitals:    02/13/20 1117 02/13/20 1153   BP: 143/78 146/77   Pulse: 92    Resp: 18    Temp: 98 °F (36.7 °C)    TempSrc: Oral    SpO2: 92%    Weight: 193 lb (87.5 kg)    Height: 5' 2\" (1.575 m)        Current Outpatient Medications   Medication Sig    fluticasone propion-salmeteroL (ADVAIR DISKUS) 250-50 mcg/dose diskus inhaler INHALE 1 PUFF BY MOUTH TWICE DAILY    triamcinolone acetonide (KENALOG) 0.1 % topical cream APPLY TO THE AFFECTED AREA TWICE DAILY. USE THIN LAYER    insulin degludec-liraglutide (XULTOPHY 100/3.6) 100 unit-3.6 mg /mL (3 mL) inpn INJECT 32 UNITS UNDER THE SKIN EVERY DAY FOR 1 WEEK THEN INCREASE BY 4 UNITS WEEKLY UNTIL AT 50 UNITS DAILY    lisinopril (PRINIVIL, ZESTRIL) 30 mg tablet Take 1 Tab by mouth daily.     allopurinoL (ZYLOPRIM) 100 mg tablet TAKE 1 TABLET BY MOUTH DAILY AS NEEDED FOR GOUT PAIN    XARELTO 20 mg tab tablet TAKE 1 TABLET BY MOUTH EVERY DAY WITH DINNER    SITagliptin (JANUVIA) 100 mg tablet TAKE 1 TABLET BY MOUTH EVERY DAY    gabapentin (NEURONTIN) 600 mg tablet TAKE 1 TABSULE BY MOUTH THREE TIMES DAILY    rosuvastatin (CRESTOR) 20 mg tablet TAKE 1 TABLET BY MOUTH EVERY NIGHT    Blood-Glucose Meter monitoring kit Check BG bid on insulin E11.65    lancets misc Check BG bid on insulin E11.65    glucose blood VI test strips (BLOOD GLUCOSE TEST) strip Check BG bid on insulin E11.65    Insulin Needles, Disposable, (YOMI PEN NEEDLE) 32 gauge x 5/32\" ndle USE DAILY WITH TRESIBA    dilTIAZem CD (CARTIA XT) 240 mg ER capsule TAKE 1 CAPSULE BY MOUTH DAILY    albuterol (PROVENTIL HFA, VENTOLIN HFA, PROAIR HFA) 90 mcg/actuation inhaler Take 2 Puffs by inhalation every four (4) hours as needed for Wheezing.  umeclidinium (INCRUSE ELLIPTA) 62.5 mcg/actuation inhaler Take 1 Puff by inhalation daily.  nitroglycerin (NITROSTAT) 0.4 mg SL tablet 1 Tab by SubLINGual route every five (5) minutes as needed for Chest Pain.  albuterol (PROVENTIL VENTOLIN) 2.5 mg /3 mL (0.083 %) nebulizer solution USE 1 VIAL VIA NEBULIZER Q 4 H PRF WHEEZING    ondansetron (ZOFRAN ODT) 8 mg disintegrating tablet Take 1 Tab by mouth every eight (8) hours as needed for Nausea.  mupirocin (BACTROBAN) 2 % ointment Apply  to affected area daily.  aspirin delayed-release 81 mg tablet Take  by mouth daily.  naproxen sodium (ALEVE) 220 mg cap Take  by mouth daily. Indications: for pain    Insulin Needles, Disposable, 31 gauge x 7/45\" ndle For application of insulin.  Insulin Syringes, Disposable, 1 mL syrg 1 Syringe by Does Not Apply route two (2) times a day. Before breakfast and 12 hours later.  gabapentin (NEURONTIN) 300 mg capsule Take 1 Cap by mouth three (3) times daily.  Max Daily Amount: 900 mg.    insulin degludec (TRESIBA FLEXTOUCH U-100) 100 unit/mL (3 mL) inpn INJECT 30 UNITS UNDER THE SKIN NIGHTLY    benzonatate (TESSALON) 100 mg capsule TAKE 1 CAPSULE BY MOUTH THREE TIMES DAILY AS NEEDED FOR COUGH     No current facility-administered medications for this visit. Physical Examination: General appearance - alert, well appearing, and in no distress  Chest - clear to auscultation, no wheezes, rales or rhonchi, symmetric air entry  Heart - normal rate, regular rhythm, normal S1, S2, no murmurs, rubs, clicks or gallops  Abdomen - soft, nontender, nondistended, no masses or organomegaly  Skin - SK L shoulder      Assessment/ Plan:   Diagnoses and all orders for this visit:    1. Type 2 diabetes mellitus with hyperglycemia, with long-term current use of insulin (HCC)  -     HEMOGLOBIN A1C WITH EAG; Future  -     insulin degludec-liraglutide (XULTOPHY 100/3.6) 100 unit-3.6 mg /mL (3 mL) inpn; INJECT 32 UNITS UNDER THE SKIN EVERY DAY FOR 1 WEEK THEN INCREASE BY 4 UNITS WEEKLY UNTIL AT 50 UNITS DAILY    2. Pustular psoriasis of palms and soles  -     triamcinolone acetonide (KENALOG) 0.1 % topical cream; APPLY TO THE AFFECTED AREA TWICE DAILY. USE THIN LAYER    3. Screening for malignant neoplasm of breast  -     [facility] MAMMO BI SCREENING INCL CAD; Future    4. Chronic bronchitis, unspecified chronic bronchitis type (HCC)  -     fluticasone propion-salmeteroL (ADVAIR DISKUS) 250-50 mcg/dose diskus inhaler; INHALE 1 PUFF BY MOUTH TWICE DAILY    5. Essential hypertension  -     METABOLIC PANEL, COMPREHENSIVE; Future  -     lisinopril (PRINIVIL, ZESTRIL) 30 mg tablet; Take 1 Tab by mouth daily. 6. Tobacco abuse  Discussed health and financial impacts of smoking, pt not ready  7. Hypercholesteremia  -     LIPID PANEL; Future  -     METABOLIC PANEL, COMPREHENSIVE; Future    8. Encounter for screening mammogram for malignant neoplasm of breast  -     [facility] MAMMO BI SCREENING INCL CAD; Future    9. Seborrheic keratoses  reassured     Follow-up and Dispositions    · Return in about 4 weeks (around 3/12/2020), or if symptoms worsen or fail to improve, for bp check. I have discussed the diagnosis with the patient and the intended plan as seen in the above orders.   The patient has received an after-visit summary and questions were answered concerning future plans. Pt conveyed understanding of plan.     Medication Side Effects and Warnings were discussed with patient      Clearence Plana, DO

## 2020-02-13 NOTE — PATIENT INSTRUCTIONS

## 2020-02-14 NOTE — PROGRESS NOTES
Diabetes has improved keep working on diet and recheck 3 months. I want her to go up to 40 units and stop there instead of stopping at 50 units.

## 2020-02-18 NOTE — PROGRESS NOTES
Called and spoke with patient in reference to labs. Advised to stop at 40 units instead of 50 units. Patient verbalized understanding. She states that she will call back to schedule f/u visit.

## 2020-02-25 ENCOUNTER — HOSPITAL ENCOUNTER (OUTPATIENT)
Dept: MAMMOGRAPHY | Age: 60
Discharge: HOME | End: 2020-02-25
Attending: FAMILY MEDICINE
Payer: MEDICARE

## 2020-02-25 DIAGNOSIS — Z12.31 ENCOUNTER FOR SCREENING MAMMOGRAM FOR MALIGNANT NEOPLASM OF BREAST: ICD-10-CM

## 2020-02-25 DIAGNOSIS — Z12.39 SCREENING FOR MALIGNANT NEOPLASM OF BREAST: ICD-10-CM

## 2020-02-25 PROCEDURE — 77063 BREAST TOMOSYNTHESIS BI: CPT

## 2020-02-26 ENCOUNTER — PATIENT OUTREACH (OUTPATIENT)
Dept: FAMILY MEDICINE CLINIC | Age: 60
End: 2020-02-26

## 2020-02-26 NOTE — PROGRESS NOTES
Goals      Skills and education necessary to properly manage diabetes      10/7/19 -   A1C 8.1 5/29/19  Current level of understanding: Poor understanding of disease and management with monitoring blood sugars and taking insulin. Reports checking blood sugars maybe a few times a week. Only taking Novolog a few times a week. Reports 100% compliance with long acting insulin. Long acting insulin was increased to 30 units and short acting increased to 12 units by Dr. Rajinder Grady in May. Patient due to repeat A1C. Desired Outcome: Patient's will verbalize knowledge of disease process, medical, and nutrition management. Patient will test blood sugars regularly and take medications as prescribed. Patient will follow recommended diet plan. Plan: ACM booked appointment for repeat A1C on 10/17/19. Patient will attend appointment. Patient commits to checking fasting blood sugar once a day and keeping log. Patient will discuss log with ACM and bring to appointment. Patient concerned that she does not eat 3 meals a day, usually only has 2 meals. Patient will take Novolog as prescribed (12 units with meals, even if only 2 meals). Discussed taking Novolog at start of meal to avoid hypoglycemia. Reviewed s/s of hypoglycemia and how to manage low blood sugar. 10/21/19  Assessment: Patient attended appointment with Dr. Rajinder Grady on 10/17. Patient started on Xultophy and advised to check blood sugars BID and keep log. Patient to follow-up in 1 month and bring log to appointment. Patient reporting blood sugars running 300-400. Plan: Reviewed dosing instructions of xultophy. Patient taking as prescribed. Patient to continue monitoring blood sugars. ACM will clarify with Dr. Rajinder Grady if patient is to continue short acting insulin, patient is currently not taking. Message sent to Dr. Rajinder Grady and will return call to patient with instructions. 10/23/19  Received clarification from Dr. Rajinder Grady. Patient should also be using short acting insulin. Attempted to contact patient to discuss. No voicemail set up.   10/24/19  Plan: Spoke with patient and reviewed instructions from Dr. Noah Bull. Patient will inject 12 units Novolog before meals and continue titrating up on xultophy as prescribed. Went over dosing instructions. Patient will monitor blood sugars BID and discuss with ACM at next call. 11/26/19  Assessment: Patient now at max dose of xultophy, 32 units daily. Reports that blood sugars are running 160-200 and she is testing BID. States that the 12 units of novolog before meals makes her feel shaky, so she stopped taking. Discussed that she likely needs to cut back on the novolog now that blood sugars are stabilizing with xultophy. Plan: ACM will send message to Dr. Tamica Smith and return call to the patient with instructions. 11/27/19  Received instructions for patient to decrease novolog to 6 units. Patient made aware. Patient will continue testing BID and taking all medications as prescribed. 1/27/20  Assessment: Patient reports compliance with xultophy, however, is not testing blood sugars. And is only taking novololg when she \"feels\" like her sugar is high. States that this is rarely. Has no explanation as to why she stopped testing. Last A1c 10/17/19. Plan: Scheduled appointment for diabetes check and repeat A1c. Patient also interested in [facility] and feels that this would help her be more compliant with testing. ACM will follow-up with patient after A1c results received.      2/26/20  Assessment: Patient attended appointment with Dr. Noah Bull. A1c improved to 7.0. Compliant with medications, needs additional diet teaching. Plan: Patient not interested in meeting with dietician at diabetes treatment center. Mailing information for \"[facility]. \" Patient will contact to discuss nutrition management of blood sugar.

## 2020-03-30 RX ORDER — ALLOPURINOL 100 MG/1
TABLET ORAL
Qty: 30 TAB | Refills: 1 | Status: SHIPPED | OUTPATIENT
Start: 2020-03-30 | End: 2020-05-31

## 2020-04-28 DIAGNOSIS — E11.41 DIABETIC MONONEUROPATHY ASSOCIATED WITH TYPE 2 DIABETES MELLITUS (HCC): ICD-10-CM

## 2020-04-29 RX ORDER — GABAPENTIN 600 MG/1
TABLET ORAL
Qty: 270 TAB | Refills: 0 | Status: SHIPPED | OUTPATIENT
Start: 2020-04-29 | End: 2020-06-23

## 2020-04-29 NOTE — TELEPHONE ENCOUNTER
Called and spoke with patient. Advised apt needed for next fill. Patient will call back to schedule.

## 2020-05-12 DIAGNOSIS — I10 ESSENTIAL HYPERTENSION: ICD-10-CM

## 2020-05-13 RX ORDER — LISINOPRIL 30 MG/1
30 TABLET ORAL DAILY
Qty: 90 TAB | Refills: 0 | Status: SHIPPED | OUTPATIENT
Start: 2020-05-13 | End: 2020-08-10 | Stop reason: SDUPTHER

## 2020-05-31 RX ORDER — ALLOPURINOL 100 MG/1
TABLET ORAL
Qty: 30 TAB | Refills: 1 | Status: SHIPPED | OUTPATIENT
Start: 2020-05-31 | End: 2020-07-31 | Stop reason: SDUPTHER

## 2020-06-19 ENCOUNTER — TELEPHONE (OUTPATIENT)
Dept: CARDIOLOGY CLINIC | Age: 60
End: 2020-06-19

## 2020-06-19 NOTE — TELEPHONE ENCOUNTER
Barb Sanders with [facility] requesting a clearance for to hold Xarelto for 48 hours and cardiac clearance for oral surgery.  Please advise        [phone]  Fax [phone]

## 2020-06-22 NOTE — PROGRESS NOTES
LAST OFFICE VISIT : 3/30/2018        ICD-10-CM ICD-9-CM   1. Preop cardiovascular exam Z01.810 V72.81   2. Coronary artery disease involving native coronary artery of native heart without angina pectoris I25.10 414.01   3. Essential hypertension I10 401.9   4. Hypercholesteremia E78.00 272.0   5. Type 2 diabetes mellitus with hyperglycemia, without long-term current use of insulin [facility]) E11.65 250.00     790.29            Wendel Aase is a 61 y.o. female with diabetes, hypertension and dyslipidemia referred for 3 week follow up. Cardiac risk factors: smoking/ tobacco exposure, dyslipidemia, diabetes mellitus, obesity, sedentary life style, hypertension, post-menopausal  I have personally obtained the history from the patient. HISTORY OF PRESENTING ILLNESS     She states she is doing well with no chest discomfort or shortness of breath. I do not think she is very active. Last time I saw her she was having chest discomfort with radiation to her left arm. I do not think she has been very compliant she with stopping smoking and she knows that smoking can increase her peripheral vascular disease. She does have left subclavian stenting and what she said was stenting of the both legs bilaterally. ATTENTION:   This medical record was transcribed using an electronic medical records/speech recognition system. Although proofread, it may and can contain electronic, spelling and other errors. Corrections may be executed at a later time. Please feel free to contact us for any clarifications as needed.          ACTIVE PROBLEM LIST     Patient Active Problem List    Diagnosis Date Noted    Severe obesity ([facility] 10/17/2019    Coronary artery disease involving native coronary artery of native heart without angina pectoris 03/20/2018    Type 2 diabetes mellitus with hyperglycemia ([facility] 11/20/2015    Microalbuminuria 11/20/2015    Obesity (BMI 30-39.9) 11/15/2015    PVD (peripheral vascular disease) ([address] 11/15/2015    Hyperglycemia due to type 2 diabetes mellitus ([facility] 11/15/2015    Tobacco abuse 11/15/2015    Diabetic neuropathy ([facility] 03/10/2015    Migraine 03/10/2015    Hypercholesteremia 03/10/2015    Sebaceous cyst 03/08/2011    COPD (chronic obstructive pulmonary disease) ([facility] 01/17/2011    Depression 01/17/2011    DM (diabetes mellitus) ([facility] 01/03/2011    HTN (hypertension) 01/03/2011           PAST MEDICAL HISTORY     Past Medical History:   Diagnosis Date    Aneurysm ([facility]     cerebral    Anxiety     Asthma     Depression     Diabetes ([facility]     Headache(784.0)     Hypercholesterolemia     Hypertension     Other ill-defined conditions(799.89)     high cholesterol    Other ill-defined conditions(799.89)     pvd    Stroke ([facility] 2006    no residual           PAST SURGICAL HISTORY     Past Surgical History:   Procedure Laterality Date    ANEURYSM, INTRACRAN, SIMPLE SURG      HX GYN      HX OTHER SURGICAL      excision cyst/bilateral axilla          ALLERGIES     Allergies   Allergen Reactions    Ciprofloxacin Nausea Only and Vertigo          FAMILY HISTORY     Family History   Problem Relation Age of Onset    Hypertension Mother     Cancer Father     Breast Cancer Maternal Aunt     Breast Cancer Maternal Aunt     negative for cardiac disease       SOCIAL HISTORY     Social History     Socioeconomic History    Marital status: SINGLE     Spouse name: Not on file    Number of children: Not on file    Years of education: Not on file    Highest education level: Not on file   Tobacco Use    Smoking status: Current Every Day Smoker     Packs/day: 1.00     Years: 35.00     Pack years: 35.00    Smokeless tobacco: Never Used   Substance and Sexual Activity    Alcohol use: Yes     Comment: occasional    Drug use: No         MEDICATIONS     Current Outpatient Medications   Medication Sig    allopurinoL (ZYLOPRIM) 100 mg tablet TAKE 1 TABLET BY MOUTH DAILY AS NEEDED FOR GOUT PAIN    lisinopriL (PRINIVIL, ZESTRIL) 30 mg tablet Take 1 Tab by mouth daily.  tiotropium bromide (Spiriva Respimat) 2.5 mcg/actuation inhaler Take 2 Puffs by inhalation daily.  gabapentin (NEURONTIN) 600 mg tablet TAKE 1 TABLET BY MOUTH THREE TIMES DAILY    SITagliptin (Januvia) 100 mg tablet TAKE 1 TABLET BY MOUTH EVERY DAY    fluticasone propion-salmeteroL (ADVAIR DISKUS) 250-50 mcg/dose diskus inhaler INHALE 1 PUFF BY MOUTH TWICE DAILY    triamcinolone acetonide (KENALOG) 0.1 % topical cream APPLY TO THE AFFECTED AREA TWICE DAILY. USE THIN LAYER    insulin degludec-liraglutide (XULTOPHY 100/3.6) 100 unit-3.6 mg /mL (3 mL) inpn INJECT 32 UNITS UNDER THE SKIN EVERY DAY FOR 1 WEEK THEN INCREASE BY 4 UNITS WEEKLY UNTIL AT 50 UNITS DAILY    XARELTO 20 mg tab tablet TAKE 1 TABLET BY MOUTH EVERY DAY WITH DINNER    rosuvastatin (CRESTOR) 20 mg tablet TAKE 1 TABLET BY MOUTH EVERY NIGHT    Blood-Glucose Meter monitoring kit Check BG bid on insulin E11.65    lancets misc Check BG bid on insulin E11.65    glucose blood VI test strips (BLOOD GLUCOSE TEST) strip Check BG bid on insulin E11.65    Insulin Needles, Disposable, (YOMI PEN NEEDLE) 32 gauge x 5/32\" ndle USE DAILY WITH TRESIBA    gabapentin (NEURONTIN) 300 mg capsule Take 1 Cap by mouth three (3) times daily. Max Daily Amount: 900 mg.    dilTIAZem CD (CARTIA XT) 240 mg ER capsule TAKE 1 CAPSULE BY MOUTH DAILY    albuterol (PROVENTIL HFA, VENTOLIN HFA, PROAIR HFA) 90 mcg/actuation inhaler Take 2 Puffs by inhalation every four (4) hours as needed for Wheezing.  insulin degludec (TRESIBA FLEXTOUCH U-100) 100 unit/mL (3 mL) inpn INJECT 30 UNITS UNDER THE SKIN NIGHTLY    benzonatate (TESSALON) 100 mg capsule TAKE 1 CAPSULE BY MOUTH THREE TIMES DAILY AS NEEDED FOR COUGH    nitroglycerin (NITROSTAT) 0.4 mg SL tablet 1 Tab by SubLINGual route every five (5) minutes as needed for Chest Pain.     albuterol (PROVENTIL VENTOLIN) 2.5 mg /3 mL (0.083 %) nebulizer solution USE 1 VIAL VIA NEBULIZER Q 4 H PRF WHEEZING    ondansetron (ZOFRAN ODT) 8 mg disintegrating tablet Take 1 Tab by mouth every eight (8) hours as needed for Nausea.  mupirocin (BACTROBAN) 2 % ointment Apply  to affected area daily.  aspirin delayed-release 81 mg tablet Take  by mouth daily.  naproxen sodium (ALEVE) 220 mg cap Take  by mouth daily. Indications: for pain    Insulin Needles, Disposable, 31 gauge x 2/95\" ndle For application of insulin.  Insulin Syringes, Disposable, 1 mL syrg 1 Syringe by Does Not Apply route two (2) times a day. Before breakfast and 12 hours later. No current facility-administered medications for this visit. I have reviewed the nurses notes, vitals, problem list, allergy list, medical history, family, social history and medications. REVIEW OF SYMPTOMS      General: Pt denies excessive weight gain or loss. Pt is able to conduct ADL's  HEENT: Denies blurred vision, headaches, hearing loss, epistaxis and difficulty swallowing. Respiratory: Denies cough, congestion, shortness of breath, FLORES, wheezing or stridor. Cardiovascular: Denies precordial pain, palpitations, edema or PND  Gastrointestinal: Denies poor appetite, indigestion, abdominal pain or blood in stool  Genitourinary: Denies hematuria, dysuria, increased urinary frequency  Musculoskeletal: Denies joint pain or swelling from muscles or joints  Neurologic: Denies tremor, paresthesias, headache, or sensory motor disturbance  Psychiatric: Denies confusion, insomnia, depression  Integumentray: Denies rash, itching or ulcers. Hematologic: Denies easy bruising, bleeding     PHYSICAL EXAMINATION      There were no vitals filed for this visit. General: Well developed, in no acute distress. HEENT: No jaundice, oral mucosa moist, no oral ulcers  Neck: Supple, no stiffness, no lymphadenopathy, supple  Heart:  Normal S1/S2 negative S3 or S4.  Regular, no murmur, gallop or rub, no jugular venous distention  Respiratory: Clear bilaterally x 4, no wheezing or rales  Extremities:  No edema, normal cap refill, no cyanosis. Musculoskeletal: No clubbing, no deformities  Neuro: A&Ox3, speech clear, gait stable, cooperative, no focal neurologic deficits  Skin: Skin color is normal. No rashes or lesions. Non diaphoretic, moist.       DIAGNOSTIC DATA     1. Echo  8/27/15- EF 65%  (2/22/18) LVEF 45% grade 1 DD. LA mild dilated. MV mild regurg. AV leaflets sclerosed with mild regurg. 2. Cath  8/27/15-  EF 60 %., LAD luminal irreg. 20%, LCX minor luminal irreg. RCA ok, PDA 30 and 40%    Left leg disease seen on the abdominal aortic injeciton up to 80%. Right subclavian: There was a 95 % stenosis. 3. Lipids  4/20/16- , HDL 26, LDL 86, Tg 167  (2/19/18) , HDL 32, LDL 88,   5/29/19- , HDL 35, LDL 51,   2/13/20- , HDL 37, LDL 60.2,        4. Carotid Doppler  8/27/15- 10-49% L/R, subclavian steal syndrome indicated due to reversal of vertebral artery flow. 5/9/18- 10-49% L/R    5. Stress Test  Lexiscan- 5/9/18- Abnormal myocardial perfusion imaging with small area of inferior infarct with mild periinfact ischemia.  The LVEF is 37%         LABORATORY DATA            Lab Results   Component Value Date/Time    WBC 10.0 04/19/2018 03:58 PM    HGB 13.8 04/19/2018 03:58 PM    HCT 42.7 04/19/2018 03:58 PM    PLATELET 472 27/36/3300 03:58 PM    MCV 92.2 04/19/2018 03:58 PM      Lab Results   Component Value Date/Time    Sodium 141 02/13/2020 11:55 AM    Potassium 4.3 02/13/2020 11:55 AM    Chloride 109 (H) 02/13/2020 11:55 AM    CO2 27 02/13/2020 11:55 AM    Anion gap 5 02/13/2020 11:55 AM    Glucose 135 (H) 02/13/2020 11:55 AM    BUN 10 02/13/2020 11:55 AM    Creatinine 0.79 02/13/2020 11:55 AM    BUN/Creatinine ratio 13 02/13/2020 11:55 AM    GFR est AA >60 02/13/2020 11:55 AM    GFR est non-AA >60 02/13/2020 11:55 AM    Calcium 9.4 02/13/2020 11:55 AM    Bilirubin, total 0.2 02/13/2020 11:55 AM    Alk. phosphatase 106 02/13/2020 11:55 AM    Protein, total 7.6 02/13/2020 11:55 AM    Albumin 3.8 02/13/2020 11:55 AM    Globulin 3.8 02/13/2020 11:55 AM    A-G Ratio 1.0 (L) 02/13/2020 11:55 AM    ALT (SGPT) 25 02/13/2020 11:55 AM           ASSESSMENT/RECOMMENDATIONS:.   1. Cardiac preop risk stratification  -She is scheduled for dental extraction most likely under anesthesia. She has a significant history of peripheral vascular disease but her last cath in 2015 demonstrated no significant coronary artery disease. Her EF by echo had gone down to 45% from 85% 2015. Her last stress test showed a small defect in the inferior wall with mild roshan-infarct ischemia. This very well could be that she developed significant disease of the PDA with infarct at that time.  -Her risk factors are extremely high including her diabetes with a hemoglobin of 7, tobacco abuse of 1 pack/day, I think she probably does not exercise regularly.  -Regarding cardiac preoperative risk modification she recently had a myocardial perfusion scan shows a small area of inferior infarct with mild roshan-infarct ischemia. Her EF is gone down to 37% but this is based on this pharmacologic stress test.  Her EF is been typically in the 40% range. She has insignificant LAD disease but has some distal right I will not believe any further cardiac testing is indicated since she is asymptomatic but she is most likely intermediate risk. I will watch her fluid volume closely and she may come off her Xarelto 5 days in advance of the surgery and resume the day of surgery. -1.  Abnormal myocardial perfusion imaging with small area of inferior infarct with mild periinfact ischemia. The LVEF is 37%.    2.  No EKG changes of ischemia on pharmacological stress test.   3.  No previous test to compare.        The Risk/Extent of Ischemia is low to intermediate. SDS 4.    2.  Atrial fibrillation/SVT   -Has not complained today of any irregularity in her heart rhythm but does have a history of A. fib. This is why she takes the Xarelto. 3. Subclavian stenting ballooned open 7/31/17  4. CAD/Chest pain  - She has minimal CAD on her cath in 2015 and her last stress test demonstrated a small roshan-infarct inferior defect. She states that she is not very active but does not have chest discomfort  5. Shortness of breath  -She states she is not having any significant shortness of breath. I do not think she is real active and with her history of tobacco abuse I think she needs X a Lexiscan Cardiolite  5. Claudication  - Being followed by Dr. Eligio Callahan but she states she has not seen in years. She says there is no reason she has no pain in her legs anymore pulses are slightly diminished in the right leg and more so on the left  7. Diabetes mellitus  - Last HGB A1C was 7 %, goal should be 6% or below. 8. Tobacco dependency   - She is still smoking about a pack a day, I had a long discussion with her about the need to quit smoking. 9. Hypertension  -Elevated today but she has not taking any of her blood pressure medicine so we will repeat her blood pressure at the time of her Lexiscan Cardiolite. 10 Dyslipidemia  -Cholesterol is at goal with LDL of 60 in this diabetic. His numbers are good continue on current medical regimen  11. Return in 6 months or PRN. ECG today is normal sinus rhythm with LVH. No orders of the defined types were placed in this encounter. Follow-up and Dispositions  ·   Return in about 6 months (around 12/23/2020). I have discussed the diagnosis with  Jorge Ding and the intended plan as seen in the above orders. Questions were answered concerning future plans. I have discussed medication side effects and warnings with the patient as well. Thank you,  [place] Show, DO for involving me in the care of  Jorge Ding. Please do not hesitate to contact me for further questions/concerns.          Tesfaye Alejandre DENISE Castillo MD, [facility]    Patient Care Team:  Marco Moseley DO as PCP - General (Family Practice)  Marco Moseley DO as PCP - [address] [place] [address]  [facility] Provider  Gorge Jaime MD (Cardiology)  Usama Iniguez RN as [phone] (Family Practice)    [address] [facility]      [phone] / [phone] Fax               Amy Stephens MD

## 2020-06-23 ENCOUNTER — OFFICE VISIT (OUTPATIENT)
Dept: CARDIOLOGY CLINIC | Age: 60
End: 2020-06-23

## 2020-06-23 VITALS
SYSTOLIC BLOOD PRESSURE: 180 MMHG | HEART RATE: 80 BPM | HEIGHT: 62 IN | BODY MASS INDEX: 36.25 KG/M2 | DIASTOLIC BLOOD PRESSURE: 80 MMHG | WEIGHT: 197 LBS

## 2020-06-23 DIAGNOSIS — I25.10 CORONARY ARTERY DISEASE INVOLVING NATIVE CORONARY ARTERY OF NATIVE HEART WITHOUT ANGINA PECTORIS: ICD-10-CM

## 2020-06-23 DIAGNOSIS — I48.0 PAROXYSMAL ATRIAL FIBRILLATION (HCC): ICD-10-CM

## 2020-06-23 DIAGNOSIS — E11.65 TYPE 2 DIABETES MELLITUS WITH HYPERGLYCEMIA, WITHOUT LONG-TERM CURRENT USE OF INSULIN (HCC): ICD-10-CM

## 2020-06-23 DIAGNOSIS — Z01.810 PREOP CARDIOVASCULAR EXAM: Primary | ICD-10-CM

## 2020-06-23 DIAGNOSIS — I10 ESSENTIAL HYPERTENSION: ICD-10-CM

## 2020-06-23 DIAGNOSIS — E78.00 HYPERCHOLESTEREMIA: ICD-10-CM

## 2020-06-23 NOTE — PROGRESS NOTES
Visit Vitals  /80   Pulse 80   Ht 5' 2\" (1.575 m)   Wt 197 lb (89.4 kg)   BMI 36.03 kg/m²     Medication changes for this OV VO DR Tiana Pedroza

## 2020-06-26 ENCOUNTER — TELEPHONE (OUTPATIENT)
Dept: CARDIOLOGY CLINIC | Age: 60
End: 2020-06-26

## 2020-07-01 NOTE — TELEPHONE ENCOUNTER
Pt cancelled her stress test to get clearance for oral surgery due to being afraid of the test. Any other testing can be done to get her clearance?

## 2020-07-01 NOTE — TELEPHONE ENCOUNTER
Leslye Aguilar with Dr. Mark Love needs clearance letter for oral surgrey./teeth extractions and clearance told hold Xarelto 48 hours pre-op.       Fax: [phone]  Phone: [phone]

## 2020-07-06 NOTE — TELEPHONE ENCOUNTER
Patient would like to reschedule her nuc but stated that it needs to be done much sooner than first available. Please advise.     Phone #: [phone]  Thanks

## 2020-07-08 ENCOUNTER — DOCUMENTATION ONLY (OUTPATIENT)
Dept: CARDIOLOGY CLINIC | Age: 60
End: 2020-07-08

## 2020-07-09 RX ORDER — ALBUTEROL SULFATE 90 UG/1
AEROSOL, METERED RESPIRATORY (INHALATION)
Qty: 18 G | Refills: 5 | Status: SHIPPED | OUTPATIENT
Start: 2020-07-09 | End: 2020-08-18

## 2020-07-14 ENCOUNTER — TELEPHONE (OUTPATIENT)
Dept: CARDIOLOGY CLINIC | Age: 60
End: 2020-07-14

## 2020-07-14 NOTE — TELEPHONE ENCOUNTER
Clarita with Oral & Facial Surgery would like to records faxed again as something happened and they did not come through correctly. Thanks.

## 2020-07-14 NOTE — TELEPHONE ENCOUNTER
Pt did stress test after you gave your risk stratifications for teeth extracts. Now that the results are in is the risk stratifications going to be listed as low instead of intermediate ?

## 2020-07-31 RX ORDER — ALLOPURINOL 100 MG/1
TABLET ORAL
Qty: 30 TAB | Refills: 1 | Status: SHIPPED | OUTPATIENT
Start: 2020-07-31 | End: 2020-10-01

## 2020-08-10 DIAGNOSIS — I10 ESSENTIAL HYPERTENSION: ICD-10-CM

## 2020-08-11 RX ORDER — LISINOPRIL 30 MG/1
30 TABLET ORAL DAILY
Qty: 90 TAB | Refills: 0 | Status: SHIPPED | OUTPATIENT
Start: 2020-08-11 | End: 2020-12-02 | Stop reason: SDUPTHER

## 2020-08-15 DIAGNOSIS — E11.65 TYPE 2 DIABETES MELLITUS WITH HYPERGLYCEMIA, WITH LONG-TERM CURRENT USE OF INSULIN (HCC): ICD-10-CM

## 2020-08-15 DIAGNOSIS — Z79.4 TYPE 2 DIABETES MELLITUS WITH HYPERGLYCEMIA, WITH LONG-TERM CURRENT USE OF INSULIN (HCC): ICD-10-CM

## 2020-08-15 NOTE — LETTER
8/18/2020 1:29 PM 
 
Ms. Joy Dahl [facility] [address] Dear Ms. Geoffrey Busch Chimera missed you! We have sent a 30 day supply of your medication to pharmacy,but please call our office at [phone] and schedule a follow up/lab appointment for your continued care before the next refill. Sincerely, Jenaro Villanueva, DO

## 2020-08-18 RX ORDER — UMECLIDINIUM 62.5 UG/1
AEROSOL, POWDER ORAL
Qty: 1 INHALER | Refills: 1 | Status: SHIPPED | OUTPATIENT
Start: 2020-08-18 | End: 2020-08-19

## 2020-08-18 RX ORDER — DILTIAZEM HYDROCHLORIDE 240 MG/1
CAPSULE, COATED, EXTENDED RELEASE ORAL
Qty: 90 CAP | Refills: 3 | Status: SHIPPED | OUTPATIENT
Start: 2020-08-18 | End: 2021-05-26

## 2020-08-18 RX ORDER — INSULIN DEGLUDEC INJECTION 100 U/ML
INJECTION, SOLUTION SUBCUTANEOUS
Qty: 18 ML | Refills: 1 | Status: SHIPPED | OUTPATIENT
Start: 2020-08-18

## 2020-08-18 RX ORDER — ALBUTEROL SULFATE 90 UG/1
AEROSOL, METERED RESPIRATORY (INHALATION)
Qty: 18 G | Refills: 5 | Status: SHIPPED | OUTPATIENT
Start: 2020-08-18 | End: 2020-11-10

## 2020-08-18 RX ORDER — INSULIN ASPART 100 [IU]/ML
INJECTION, SOLUTION INTRAVENOUS; SUBCUTANEOUS
Qty: 15 ML | Refills: 5 | Status: SHIPPED | OUTPATIENT
Start: 2020-08-18

## 2020-08-27 DIAGNOSIS — E11.41 DIABETIC MONONEUROPATHY ASSOCIATED WITH TYPE 2 DIABETES MELLITUS (HCC): ICD-10-CM

## 2020-09-01 RX ORDER — GABAPENTIN 300 MG/1
300 CAPSULE ORAL 3 TIMES DAILY
Qty: 270 CAP | Refills: 0 | OUTPATIENT
Start: 2020-09-01

## 2020-09-03 ENCOUNTER — VIRTUAL VISIT (OUTPATIENT)
Dept: FAMILY MEDICINE CLINIC | Age: 60
End: 2020-09-03

## 2020-09-03 DIAGNOSIS — R11.0 NAUSEA: ICD-10-CM

## 2020-09-04 RX ORDER — ONDANSETRON 8 MG/1
8 TABLET, ORALLY DISINTEGRATING ORAL
Qty: 15 TAB | Refills: 1 | Status: SHIPPED | OUTPATIENT
Start: 2020-09-04

## 2020-09-17 ENCOUNTER — VIRTUAL VISIT (OUTPATIENT)
Dept: FAMILY MEDICINE CLINIC | Age: 60
End: 2020-09-17
Payer: MEDICARE

## 2020-09-17 DIAGNOSIS — E11.65 TYPE 2 DIABETES MELLITUS WITH HYPERGLYCEMIA, WITH LONG-TERM CURRENT USE OF INSULIN (HCC): ICD-10-CM

## 2020-09-17 DIAGNOSIS — E11.65 TYPE 2 DIABETES MELLITUS WITH HYPERGLYCEMIA, WITH LONG-TERM CURRENT USE OF INSULIN (HCC): Primary | ICD-10-CM

## 2020-09-17 DIAGNOSIS — Z13.31 SCREENING FOR DEPRESSION: ICD-10-CM

## 2020-09-17 DIAGNOSIS — Z79.4 TYPE 2 DIABETES MELLITUS WITH HYPERGLYCEMIA, WITH LONG-TERM CURRENT USE OF INSULIN (HCC): ICD-10-CM

## 2020-09-17 DIAGNOSIS — Z79.4 TYPE 2 DIABETES MELLITUS WITH HYPERGLYCEMIA, WITH LONG-TERM CURRENT USE OF INSULIN (HCC): Primary | ICD-10-CM

## 2020-09-17 DIAGNOSIS — Z00.00 MEDICARE ANNUAL WELLNESS VISIT, SUBSEQUENT: ICD-10-CM

## 2020-09-17 DIAGNOSIS — L40.3 PUSTULAR PSORIASIS OF PALMS AND SOLES: ICD-10-CM

## 2020-09-17 DIAGNOSIS — E11.41 DIABETIC MONONEUROPATHY ASSOCIATED WITH TYPE 2 DIABETES MELLITUS (HCC): ICD-10-CM

## 2020-09-17 PROCEDURE — 99442 PR PHYS/QHP TELEPHONE EVALUATION 11-20 MIN: CPT | Performed by: FAMILY MEDICINE

## 2020-09-17 PROCEDURE — 3017F COLORECTAL CA SCREEN DOC REV: CPT | Performed by: FAMILY MEDICINE

## 2020-09-17 PROCEDURE — G9899 SCRN MAM PERF RSLTS DOC: HCPCS | Performed by: FAMILY MEDICINE

## 2020-09-17 PROCEDURE — 2022F DILAT RTA XM EVC RTNOPTHY: CPT | Performed by: FAMILY MEDICINE

## 2020-09-17 PROCEDURE — G8428 CUR MEDS NOT DOCUMENT: HCPCS | Performed by: FAMILY MEDICINE

## 2020-09-17 PROCEDURE — G8756 NO BP MEASURE DOC: HCPCS | Performed by: FAMILY MEDICINE

## 2020-09-17 PROCEDURE — G9717 DOC PT DX DEP/BP F/U NT REQ: HCPCS | Performed by: FAMILY MEDICINE

## 2020-09-17 PROCEDURE — G0439 PPPS, SUBSEQ VISIT: HCPCS | Performed by: FAMILY MEDICINE

## 2020-09-17 PROCEDURE — 3051F HG A1C>EQUAL 7.0%<8.0%: CPT | Performed by: FAMILY MEDICINE

## 2020-09-17 RX ORDER — GABAPENTIN 300 MG/1
300 CAPSULE ORAL 3 TIMES DAILY
Qty: 270 CAP | Refills: 1 | Status: SHIPPED | OUTPATIENT
Start: 2020-09-17 | End: 2020-09-22

## 2020-09-17 RX ORDER — TRIAMCINOLONE ACETONIDE 1 MG/G
CREAM TOPICAL
Qty: 45 G | Refills: 1 | Status: SHIPPED | OUTPATIENT
Start: 2020-09-17 | End: 2021-07-06 | Stop reason: SDUPTHER

## 2020-09-17 NOTE — PATIENT INSTRUCTIONS
Medicare Wellness Visit, Female The best way to live healthy is to have a lifestyle where you eat a well-balanced diet, exercise regularly, limit alcohol use, and quit all forms of tobacco/nicotine, if applicable. Regular preventive services are another way to keep healthy. Preventive services (vaccines, screening tests, monitoring & exams) can help personalize your care plan, which helps you manage your own care. Screening tests can find health problems at the earliest stages, when they are easiest to treat. [facility] follows the current, evidence-based guidelines published by the [facility] ([facility]) when recommending preventive services for our patients. Because we follow these guidelines, sometimes recommendations change over time as research supports it. (For example, mammograms used to be recommended annually. Even though Medicare will still pay for an annual mammogram, the newer guidelines recommend a mammogram every two years for women of average risk). Of course, you and your doctor may decide to screen more often for some diseases, based on your risk and your co-morbidities (chronic disease you are already diagnosed with). Preventive services for you include: - Medicare offers their members a free annual wellness visit, which is time for you and your primary care provider to discuss and plan for your preventive service needs. Take advantage of this benefit every year! 
-All adults over the age of 72 should receive the recommended pneumonia vaccines. Current USPSTF guidelines recommend a series of two vaccines for the best pneumonia protection.  
-All adults should have a flu vaccine yearly and a tetanus vaccine every 10 years.  
-All adults age 48 and older should receive the shingles vaccines (series of two vaccines). -All adults age 38-68 who are overweight should have a diabetes screening test once every three years. -All adults born between [birth year] and 1965 should be screened once for Hepatitis C. 
-Other screening tests and preventive services for persons with diabetes include: an eye exam to screen for diabetic retinopathy, a kidney function test, a foot exam, and stricter control over your cholesterol.  
-Cardiovascular screening for adults with routine risk involves an electrocardiogram (ECG) at intervals determined by your doctor.  
-Colorectal cancer screenings should be done for adults age 54-65 with no increased risk factors for colorectal cancer. There are a number of acceptable methods of screening for this type of cancer. Each test has its own benefits and drawbacks. Discuss with your doctor what is most appropriate for you during your annual wellness visit. The different tests include: colonoscopy (considered the best screening method), a fecal occult blood test, a fecal DNA test, and sigmoidoscopy. 
 
-A bone mass density test is recommended when a woman turns 65 to screen for osteoporosis. This test is only recommended one time, as a screening. Some providers will use this same test as a disease monitoring tool if you already have osteoporosis. -Breast cancer screenings are recommended every other year for women of normal risk, age 54-69. 
-Cervical cancer screenings for women over age 72 are only recommended with certain risk factors. Here is a list of your current Health Maintenance items (your personalized list of preventive services) with a due date: 
Health Maintenance Due Topic Date Due  
 Pap Test  12/28/1981  Shingles Vaccine (1 of 2) 12/28/2010 [facility] Annual Well Visit  05/29/2020  Yearly Flu Vaccine (1) 09/01/2020

## 2020-09-17 NOTE — PROGRESS NOTES
Myrna Georges is a 61 y.o. female, evaluated via audio-only technology on 9/17/2020 for Follow-up  . Assessment & Plan:   Diagnoses and all orders for this visit:    1. Type 2 diabetes mellitus with hyperglycemia, with long-term current use of insulin (HCC)  -     LIPID PANEL; Future  -     METABOLIC PANEL, COMPREHENSIVE; Future  -     HEMOGLOBIN A1C WITH EAG; Future    2. Diabetic mononeuropathy associated with type 2 diabetes mellitus (HCC)  -     gabapentin (NEURONTIN) 300 mg capsule; Take 1 Cap by mouth three (3) times daily. Max Daily Amount: 900 mg.    3. Pustular psoriasis of palms and soles  -     triamcinolone acetonide (KENALOG) 0.1 % topical cream; APPLY TO THE AFFECTED AREA TWICE DAILY. USE THIN LAYER    4. Medicare annual wellness visit, subsequent    5. Screening for depression  -     DEPRESSION SCREEN ANNUAL        12  Subjective:   Pt for refill of gabapentin used for neuropathic pain from DM. This does work well 300 tid. Diabetes was controlled at 7 on last check and pt is using night time nsulin and oral meds as x no issues with these. She is due for labs and will have labs ordered for her to draw fasting. On crestor 20 nightly, prior ldl at goal  Gets occasional psoriasis  of hand and would like refill of kenalog, works well when needed. Prior to Admission medications    Medication Sig Start Date End Date Taking? Authorizing Provider   ondansetron (ZOFRAN ODT) 8 mg disintegrating tablet Take 1 Tab by mouth every eight (8) hours as needed for Nausea. 9/4/20   Dick Casanova, DO   tiotropium bromide (Spiriva Respimat) 2.5 mcg/actuation inhaler Take 2 Puffs by inhalation daily.  8/19/20   Dick Casanova H, DO   SITagliptin (Januvia) 100 mg tablet TAKE 1 TABLET BY MOUTH EVERY DAY 8/19/20   Brady Brooks NP   insulin aspart U-100 (NovoLOG Flexpen U-100 Insulin) 100 unit/mL (3 mL) inpn ADMINISTER 12 UNITS UNDER THE SKIN BEFORE MEALS 8/18/20   Skylar Casanova H, DO   insulin degludec Ignacio Mirandaake FlexTouch U-100) 100 unit/mL (3 mL) inpn INJECT 30 UNITS UNDER THE SKIN AT NIGHT 8/18/20   Dick Casanova DO   Ventolin HFA 90 mcg/actuation inhaler INHALE 2 PUFFS BY MOUTH EVERY 4 HOURS AS NEEDED FOR WHEEZING 8/18/20   Dick Casanova DO   dilTIAZem ER (Cartia XT) 240 mg capsule TAKE ONE CAPSULE BY MOUTH EVERY DAY FOR 90 DAYS. 8/18/20   Dick Casanova DO   lisinopriL (PRINIVIL, ZESTRIL) 30 mg tablet Take 1 Tab by mouth daily. 8/11/20   Dick Casanova DO   allopurinoL (ZYLOPRIM) 100 mg tablet TAKE 1 TABLET BY MOUTH DAILY AS NEEDED FOR GOUT PAIN 7/31/20   Lisa Salcedo, EARL   fluticasone propion-salmeteroL (ADVAIR DISKUS) 250-50 mcg/dose diskus inhaler INHALE 1 PUFF BY MOUTH TWICE DAILY 2/13/20   Dick Casanova DO   triamcinolone acetonide (KENALOG) 0.1 % topical cream APPLY TO THE AFFECTED AREA TWICE DAILY. USE THIN LAYER 2/13/20   Dick Casanova DO   insulin degludec-liraglutide (XULTOPHY 100/3.6) 100 unit-3.6 mg /mL (3 mL) inpn INJECT 32 UNITS UNDER THE SKIN EVERY DAY FOR 1 WEEK THEN INCREASE BY 4 UNITS WEEKLY UNTIL AT 50 UNITS DAILY 2/13/20   Dick Casanova DO   XARELTO 20 mg tab tablet TAKE 1 TABLET BY MOUTH EVERY DAY WITH DINNER 12/19/19   Dick Casanova DO   rosuvastatin (CRESTOR) 20 mg tablet TAKE 1 TABLET BY MOUTH EVERY NIGHT 10/17/19   Timbo MARSHALL, DO   Blood-Glucose Meter monitoring kit Check BG bid on insulin E11.65 10/17/19   Timbo MARSHALL, DO   lancets misc Check BG bid on insulin E11.65 10/17/19   Dcik Casanova DO   glucose blood VI test strips (BLOOD GLUCOSE TEST) strip Check BG bid on insulin E11.65 10/17/19   Dick Casanova, DO   Insulin Needles, Disposable, (YOMI PEN NEEDLE) 32 gauge x 5/32\" ndle USE DAILY WITH TRESIBA 9/3/19   Dick Casanova H, DO   gabapentin (NEURONTIN) 300 mg capsule Take 1 Cap by mouth three (3) times daily.  Max Daily Amount: 900 mg. 7/16/19   Sharmila Casanova, DO   nitroglycerin (NITROSTAT) 0.4 mg SL tablet 1 Tab by SubLINGual route every five (5) minutes as needed for Chest Pain. 4/20/18   Judit Butts MD   albuterol (PROVENTIL VENTOLIN) 2.5 mg /3 mL (0.083 %) nebulizer solution USE 1 VIAL VIA NEBULIZER Q 4 H PRF WHEEZING 3/19/18   Provider, Historical   mupirocin (BACTROBAN) 2 % ointment Apply  to affected area daily. 3/29/18   Darvin MARSHALL, DO   aspirin delayed-release 81 mg tablet Take  by mouth daily. Provider, Historical   Insulin Needles, Disposable, 31 gauge x 3/43\" ndle For application of insulin. 11/16/15   Anu Navarro MD   Insulin Syringes, Disposable, 1 mL syrg 1 Syringe by Does Not Apply route two (2) times a day. Before breakfast and 12 hours later. 11/16/15   Anu Navarro MD     Patient Active Problem List   Diagnosis Code    DM (diabetes mellitus) ([facility] E11.9    HTN (hypertension) I10    COPD (chronic obstructive pulmonary disease) ([facility]) J44.9    Depression F32.9    Sebaceous cyst L72.3    Diabetic neuropathy ([facility] E11.40    Migraine G43.909    Hypercholesteremia E78.00    Obesity (BMI 30-39. 9) E66.9    PVD (peripheral vascular disease) ([facility]) I73.9    Hyperglycemia due to type 2 diabetes mellitus ([facility] E11.65    Tobacco abuse Z72.0    Type 2 diabetes mellitus with hyperglycemia ([facility]) E11.65    Microalbuminuria R80.9    Coronary artery disease involving native coronary artery of native heart without angina pectoris I25.10    Severe obesity ([facility]) E66.01       ROS    No flowsheet data found. Tong Casper, who was evaluated through a patient-initiated, synchronous (real-time) audio only encounter, and/or her healthcare decision maker, is aware that it is a billable service, with coverage as determined by her insurance carrier. She provided verbal consent to proceed: Yes. She has not had a related appointment within my department in the past 7 days or scheduled within the next 24 hours.       Total Time: minutes: 11-20 minutes    Zacarias Alicea, DO     This is the Subsequent Medicare Annual Wellness Exam, performed 12 months or more after the Initial AWV or the last Subsequent AWV    I have reviewed the patient's medical history in detail and updated the computerized patient record. History     Patient Active Problem List   Diagnosis Code    DM (diabetes mellitus) ([facility] E11.9    HTN (hypertension) I10    COPD (chronic obstructive pulmonary disease) ([facility]) J44.9    Depression F32.9    Sebaceous cyst L72.3    Diabetic neuropathy ([facility] E11.40    Migraine G43.909    Hypercholesteremia E78.00    Obesity (BMI 30-39. 9) E66.9    PVD (peripheral vascular disease) ([facility]) I73.9    Hyperglycemia due to type 2 diabetes mellitus ([facility] E11.65    Tobacco abuse Z72.0    Type 2 diabetes mellitus with hyperglycemia ([facility]) E11.65    Microalbuminuria R80.9    Coronary artery disease involving native coronary artery of native heart without angina pectoris I25.10    Severe obesity ([facility]) E66.01     Past Medical History:   Diagnosis Date    Aneurysm ([facility]     cerebral    Anxiety     Asthma     Depression     Diabetes ([facility]     Headache(784.0)     Hypercholesterolemia     Hypertension     Other ill-defined conditions(799.89)     high cholesterol    Other ill-defined conditions(799.89)     pvd    Stroke ([facility] 2006    no residual      Past Surgical History:   Procedure Laterality Date    ANEURYSM, INTRACRAN, SIMPLE SURG      HX GYN      HX OTHER SURGICAL      excision cyst/bilateral axilla     Current Outpatient Medications   Medication Sig Dispense Refill    gabapentin (NEURONTIN) 300 mg capsule Take 1 Cap by mouth three (3) times daily. Max Daily Amount: 900 mg. 270 Cap 1    triamcinolone acetonide (KENALOG) 0.1 % topical cream APPLY TO THE AFFECTED AREA TWICE DAILY. USE THIN LAYER 45 g 1    ondansetron (ZOFRAN ODT) 8 mg disintegrating tablet Take 1 Tab by mouth every eight (8) hours as needed for Nausea. 15 Tab 1    tiotropium bromide (Spiriva Respimat) 2.5 mcg/actuation inhaler Take 2 Puffs by inhalation daily.  1 Inhaler 11    SITagliptin (Januvia) 100 mg tablet TAKE 1 TABLET BY MOUTH EVERY DAY 90 Tab 0    insulin aspart U-100 (NovoLOG Flexpen U-100 Insulin) 100 unit/mL (3 mL) inpn ADMINISTER 12 UNITS UNDER THE SKIN BEFORE MEALS 15 mL 5    insulin degludec Jennifer Search FlexTouch U-100) 100 unit/mL (3 mL) inpn INJECT 30 UNITS UNDER THE SKIN AT NIGHT 18 mL 1    Ventolin HFA 90 mcg/actuation inhaler INHALE 2 PUFFS BY MOUTH EVERY 4 HOURS AS NEEDED FOR WHEEZING 18 g 5    dilTIAZem ER (Cartia XT) 240 mg capsule TAKE ONE CAPSULE BY MOUTH EVERY DAY FOR 90 DAYS. 90 Cap 3    lisinopriL (PRINIVIL, ZESTRIL) 30 mg tablet Take 1 Tab by mouth daily. 90 Tab 0    allopurinoL (ZYLOPRIM) 100 mg tablet TAKE 1 TABLET BY MOUTH DAILY AS NEEDED FOR GOUT PAIN 30 Tab 1    fluticasone propion-salmeteroL (ADVAIR DISKUS) 250-50 mcg/dose diskus inhaler INHALE 1 PUFF BY MOUTH TWICE DAILY 3 Each 3    insulin degludec-liraglutide (XULTOPHY 100/3.6) 100 unit-3.6 mg /mL (3 mL) inpn INJECT 32 UNITS UNDER THE SKIN EVERY DAY FOR 1 WEEK THEN INCREASE BY 4 UNITS WEEKLY UNTIL AT 50 UNITS DAILY 12 mL 5    XARELTO 20 mg tab tablet TAKE 1 TABLET BY MOUTH EVERY DAY WITH DINNER 30 Tab 11    rosuvastatin (CRESTOR) 20 mg tablet TAKE 1 TABLET BY MOUTH EVERY NIGHT 90 Tab 3    Blood-Glucose Meter monitoring kit Check BG bid on insulin E11.65 1 Kit 0    lancets misc Check BG bid on insulin E11.65 200 Each 3    glucose blood VI test strips (BLOOD GLUCOSE TEST) strip Check BG bid on insulin E11.65 200 Strip 3    Insulin Needles, Disposable, (YOMI PEN NEEDLE) 32 gauge x 5/32\" ndle USE DAILY WITH TRESIBA 100 Pen Needle 11    nitroglycerin (NITROSTAT) 0.4 mg SL tablet 1 Tab by SubLINGual route every five (5) minutes as needed for Chest Pain. 1 Bottle 5    albuterol (PROVENTIL VENTOLIN) 2.5 mg /3 mL (0.083 %) nebulizer solution USE 1 VIAL VIA NEBULIZER Q 4 H PRF WHEEZING  0    mupirocin (BACTROBAN) 2 % ointment Apply  to affected area daily.  22 g 0    aspirin delayed-release 81 mg tablet Take by mouth daily.  Insulin Needles, Disposable, 31 gauge x 7/71\" ndle For application of insulin. 1 Package 11    Insulin Syringes, Disposable, 1 mL syrg 1 Syringe by Does Not Apply route two (2) times a day. Before breakfast and 12 hours later. 60 Syringe 3     Allergies   Allergen Reactions    Ciprofloxacin Nausea Only and Vertigo       Family History   Problem Relation Age of Onset    Hypertension Mother     Cancer Father     Breast Cancer Maternal Aunt     Breast Cancer Maternal Aunt      Social History     Tobacco Use    Smoking status: Current Every Day Smoker     Packs/day: 1.00     Years: 35.00     Pack years: 35.00    Smokeless tobacco: Never Used   Substance Use Topics    Alcohol use: Yes     Comment: occasional       Depression Risk Factor Screening:     3 most recent PHQ Screens 9/17/2020   Little interest or pleasure in doing things Not at all   Feeling down, depressed, irritable, or hopeless Not at all   Total Score PHQ 2 0       Alcohol Risk Screen   Do you average more than 1 drink per night or more than 7 drinks a week:  No    On any one occasion in the past three months have you have had more than 3 drinks containing alcohol:  No        Functional Ability and Level of Safety:   Hearing: Hearing is good. Activities of Daily Living: The home contains: no safety equipment. Patient does total self care     Ambulation: with no difficulty     Fall Risk:  Fall Risk Assessment, last 12 mths 4/20/2016   Able to walk? Yes   Fall in past 12 months?  No     Abuse Screen:  Patient is not abused       Cognitive Screening   Has your family/caregiver stated any concerns about your memory: no        Patient Care Team   Patient Care Team:  Camille Deal DO as PCP - General (Family Medicine)  Camille Deal DO as PCP - REHABILITATION [facility] Empaneled Provider  Lenore Samano MD (Cardiology)  Augie Stearns RN as Ambulatory Care Manager (Family Medicine)    Assessment/Plan   Education and counseling provided:  Are appropriate based on today's review and evaluation    Diagnoses and all orders for this visit:    1. Type 2 diabetes mellitus with hyperglycemia, with long-term current use of insulin (HCC)  -     LIPID PANEL; Future  -     METABOLIC PANEL, COMPREHENSIVE; Future  -     HEMOGLOBIN A1C WITH EAG; Future    2. Diabetic mononeuropathy associated with type 2 diabetes mellitus (HCC)  -     gabapentin (NEURONTIN) 300 mg capsule; Take 1 Cap by mouth three (3) times daily. Max Daily Amount: 900 mg.    3. Pustular psoriasis of palms and soles  -     triamcinolone acetonide (KENALOG) 0.1 % topical cream; APPLY TO THE AFFECTED AREA TWICE DAILY. USE THIN LAYER    4. Medicare annual wellness visit, subsequent    5. Screening for depression  -     [place] Maintenance Due   Topic Date Due    PAP AKA CERVICAL CYTOLOGY  12/28/1981    Shingrix Vaccine Age 50> (1 of 2) 12/28/2010    Medicare Yearly Exam  05/29/2020    Flu Vaccine (1) 09/01/2020       Shama Fair, who was evaluated through a synchronous (real-time) audio only encounter, and/or her healthcare decision maker, is aware that it is a billable service, with coverage as determined by her insurance carrier. She provided verbal consent to proceed: Yes, and patient identification was verified. It was conducted pursuant to the emergency declaration under the [facility], [address] [place] [address] authority and the [employer] [facility] [employer] Act. A caregiver was present when appropriate. Ability to conduct physical exam was limited. I was at home. The patient was at home.     [address] [facility] [address], DO

## 2020-09-21 ENCOUNTER — TELEPHONE (OUTPATIENT)
Dept: FAMILY MEDICINE CLINIC | Age: 60
End: 2020-09-21

## 2020-09-22 RX ORDER — GABAPENTIN 600 MG/1
600 TABLET ORAL 3 TIMES DAILY
Qty: 270 TAB | Refills: 3 | Status: SHIPPED | OUTPATIENT
Start: 2020-09-22 | End: 2021-07-06 | Stop reason: SDUPTHER

## 2020-09-29 DIAGNOSIS — E11.65 TYPE 2 DIABETES MELLITUS WITH HYPERGLYCEMIA, WITH LONG-TERM CURRENT USE OF INSULIN (HCC): ICD-10-CM

## 2020-09-29 DIAGNOSIS — Z79.4 TYPE 2 DIABETES MELLITUS WITH HYPERGLYCEMIA, WITH LONG-TERM CURRENT USE OF INSULIN (HCC): ICD-10-CM

## 2020-09-29 RX ORDER — (INSULIN DEGLUDEC AND LIRAGLUTIDE) 100; 3.6 [IU]/ML; MG/ML
INJECTION, SOLUTION SUBCUTANEOUS
Qty: 12 ML | Refills: 5 | Status: SHIPPED | OUTPATIENT
Start: 2020-09-29 | End: 2021-04-22 | Stop reason: SDUPTHER

## 2020-10-01 RX ORDER — ALLOPURINOL 100 MG/1
TABLET ORAL
Qty: 30 TAB | Refills: 1 | Status: SHIPPED | OUTPATIENT
Start: 2020-10-01 | End: 2020-12-06

## 2020-10-14 DIAGNOSIS — I25.10 CORONARY ARTERY DISEASE INVOLVING NATIVE CORONARY ARTERY OF NATIVE HEART WITHOUT ANGINA PECTORIS: ICD-10-CM

## 2020-10-14 RX ORDER — ROSUVASTATIN CALCIUM 20 MG/1
TABLET, COATED ORAL
Qty: 90 TAB | Refills: 3 | Status: SHIPPED | OUTPATIENT
Start: 2020-10-14 | End: 2021-08-19

## 2020-11-10 RX ORDER — ALBUTEROL SULFATE 90 UG/1
AEROSOL, METERED RESPIRATORY (INHALATION)
Qty: 18 G | Refills: 5 | Status: SHIPPED | OUTPATIENT
Start: 2020-11-10 | End: 2021-02-23

## 2020-11-14 DIAGNOSIS — E11.65 TYPE 2 DIABETES MELLITUS WITH HYPERGLYCEMIA, WITH LONG-TERM CURRENT USE OF INSULIN (HCC): ICD-10-CM

## 2020-11-14 DIAGNOSIS — Z79.4 TYPE 2 DIABETES MELLITUS WITH HYPERGLYCEMIA, WITH LONG-TERM CURRENT USE OF INSULIN (HCC): ICD-10-CM

## 2020-12-02 DIAGNOSIS — I10 ESSENTIAL HYPERTENSION: ICD-10-CM

## 2020-12-03 RX ORDER — LISINOPRIL 30 MG/1
30 TABLET ORAL DAILY
Qty: 90 TAB | Refills: 4 | Status: SHIPPED | OUTPATIENT
Start: 2020-12-03 | End: 2021-09-23 | Stop reason: SDUPTHER

## 2020-12-06 RX ORDER — ALLOPURINOL 100 MG/1
TABLET ORAL
Qty: 30 TAB | Refills: 1 | Status: SHIPPED | OUTPATIENT
Start: 2020-12-06 | End: 2021-02-11

## 2020-12-18 ENCOUNTER — TELEPHONE (OUTPATIENT)
Dept: FAMILY MEDICINE CLINIC | Age: 60
End: 2020-12-18

## 2020-12-18 NOTE — TELEPHONE ENCOUNTER
----- Message from Wilder Reynoso sent at 12/18/2020  4:32 PM EST -----  Regarding: Dr. Samantha Ellington Message/Vendor Calls    Caller's first and last name: Pt      Reason for call: A1C results      Callback required yes/no and why: Yes, to notify pt the office received her A1C results      Best contact number(s): [phone]      Details to clarify the request: Pt stated she received [address] in the mail for her to test her A1C. She just received her results in the mail and wanted to make sure that Dr. Yazan Arriaza received his copy. Please advise.       Wilder Reynoso

## 2020-12-23 ENCOUNTER — TELEPHONE (OUTPATIENT)
Dept: FAMILY MEDICINE CLINIC | Age: 60
End: 2020-12-23

## 2020-12-23 RX ORDER — COLCHICINE 0.6 MG/1
TABLET ORAL
Qty: 30 TAB | Refills: 2 | Status: SHIPPED | OUTPATIENT
Start: 2020-12-23

## 2020-12-23 NOTE — TELEPHONE ENCOUNTER
colcrys sent in. She cant take indomethacin due to blood thinner and she never got her diabetes labs drawn so I cant give her a steroid.

## 2020-12-23 NOTE — TELEPHONE ENCOUNTER
Patient states that allopurinol has not helped with her gout. She is in a lot of pain. Can she get something else sent to [facility]? She would like to be notified when this has been done.

## 2020-12-29 NOTE — TELEPHONE ENCOUNTER
Called and spoke with patient. Advised of colcrys sent to the pharmacy. Advised that she has not had her diabetes labs drawn therefore Dr. Aleks Roland can not prescribed a steroid. Patient verbalized understanding and scheduled lab apt for 1/6/21.

## 2021-01-06 DIAGNOSIS — E11.65 TYPE 2 DIABETES MELLITUS WITH HYPERGLYCEMIA, WITH LONG-TERM CURRENT USE OF INSULIN (HCC): ICD-10-CM

## 2021-01-06 DIAGNOSIS — I25.10 CORONARY ARTERY DISEASE INVOLVING NATIVE CORONARY ARTERY OF NATIVE HEART WITHOUT ANGINA PECTORIS: Primary | ICD-10-CM

## 2021-01-06 DIAGNOSIS — Z79.4 TYPE 2 DIABETES MELLITUS WITH HYPERGLYCEMIA, WITH LONG-TERM CURRENT USE OF INSULIN (HCC): ICD-10-CM

## 2021-01-09 LAB
ALBUMIN SERPL-MCNC: 3.8 G/DL (ref 3.5–5)
ALBUMIN/GLOB SERPL: 1 {RATIO} (ref 1.1–2.2)
ALP SERPL-CCNC: 106 U/L (ref 45–117)
ALT SERPL-CCNC: 23 U/L (ref 12–78)
ANION GAP SERPL CALC-SCNC: 4 MMOL/L (ref 5–15)
AST SERPL-CCNC: 18 U/L (ref 15–37)
BILIRUB SERPL-MCNC: 0.4 MG/DL (ref 0.2–1)
BUN SERPL-MCNC: 7 MG/DL (ref 6–20)
BUN/CREAT SERPL: 8 (ref 12–20)
CALCIUM SERPL-MCNC: 9.6 MG/DL (ref 8.5–10.1)
CHLORIDE SERPL-SCNC: 108 MMOL/L (ref 97–108)
CHOLEST SERPL-MCNC: 132 MG/DL
CO2 SERPL-SCNC: 28 MMOL/L (ref 21–32)
CREAT SERPL-MCNC: 0.85 MG/DL (ref 0.55–1.02)
EST. AVERAGE GLUCOSE BLD GHB EST-MCNC: 137 MG/DL
GLOBULIN SER CALC-MCNC: 4 G/DL (ref 2–4)
GLUCOSE SERPL-MCNC: 90 MG/DL (ref 65–100)
HBA1C MFR BLD: 6.4 % (ref 4–5.6)
HDLC SERPL-MCNC: 42 MG/DL
HDLC SERPL: 3.1 {RATIO} (ref 0–5)
LDLC SERPL CALC-MCNC: 60.8 MG/DL (ref 0–100)
LIPID PROFILE,FLP: NORMAL
POTASSIUM SERPL-SCNC: 4.7 MMOL/L (ref 3.5–5.1)
PROT SERPL-MCNC: 7.8 G/DL (ref 6.4–8.2)
SODIUM SERPL-SCNC: 140 MMOL/L (ref 136–145)
TRIGL SERPL-MCNC: 146 MG/DL (ref ?–150)
VLDLC SERPL CALC-MCNC: 29.2 MG/DL

## 2021-01-12 NOTE — PROGRESS NOTES
Cholesterol remains well controlled. Diabetes improved now hemoglobin A1c at 6.4. Continue with current plan and plan to recheck in 6 months. Keep up the good work.

## 2021-01-26 ENCOUNTER — TELEPHONE (OUTPATIENT)
Dept: FAMILY MEDICINE CLINIC | Age: 61
End: 2021-01-26

## 2021-01-26 NOTE — TELEPHONE ENCOUNTER
----- Message from Wyline Shone sent at 1/26/2021  7:19 AM EST -----  Regarding: Dr. Sylvester Briceño first and last name: N/A  Reason for call: Would like to know her A1C test results  Callback required yes/no and why: yes  Best contact number(s): [phone]  Details to clarify the request: N/A

## 2021-02-11 DIAGNOSIS — J42 CHRONIC BRONCHITIS, UNSPECIFIED CHRONIC BRONCHITIS TYPE (HCC): ICD-10-CM

## 2021-02-11 RX ORDER — FLUTICASONE PROPIONATE AND SALMETEROL 250; 50 UG/1; UG/1
POWDER RESPIRATORY (INHALATION)
Qty: 3 EACH | Refills: 3 | Status: SHIPPED | OUTPATIENT
Start: 2021-02-11 | End: 2022-06-09 | Stop reason: SDUPTHER

## 2021-02-11 RX ORDER — ALLOPURINOL 100 MG/1
TABLET ORAL
Qty: 30 TAB | Refills: 1 | Status: SHIPPED | OUTPATIENT
Start: 2021-02-11 | End: 2021-04-11

## 2021-02-22 ENCOUNTER — TELEPHONE (OUTPATIENT)
Dept: FAMILY MEDICINE CLINIC | Age: 61
End: 2021-02-22

## 2021-02-22 NOTE — TELEPHONE ENCOUNTER
Pt called. She would like med sent in for gout. Pt stated that she is taking the allopurinol but it's not helping the pain. PSR made pt aware that Jocelyne is not in the office on Mondays and he will check his messages in the morning. Pt became angry and wanted message cancelled. PSR offered to look for a cancellation for today and pt refused and PSR offered pt UC and pt refused.

## 2021-02-23 RX ORDER — ALBUTEROL SULFATE 90 UG/1
AEROSOL, METERED RESPIRATORY (INHALATION)
Qty: 18 G | Refills: 5 | Status: SHIPPED | OUTPATIENT
Start: 2021-02-23 | End: 2021-11-28

## 2021-02-23 NOTE — TELEPHONE ENCOUNTER
2nd attempt. Called and spoke with patient. She states that she no longer is in pain and to disregard.

## 2021-02-23 NOTE — TELEPHONE ENCOUNTER
Called and LVM for patient to call the office back.  Dr. Madina Rice has a few virtual appointments open for this afternoon

## 2021-02-25 ENCOUNTER — TELEPHONE (OUTPATIENT)
Dept: FAMILY MEDICINE CLINIC | Age: 61
End: 2021-02-25

## 2021-02-25 NOTE — TELEPHONE ENCOUNTER
[facility] called. They would like a verbal order for diabetic supplies. Could not find pharm in [place].  Call [phone] LWT#[id number]

## 2021-02-26 ENCOUNTER — DOCUMENTATION ONLY (OUTPATIENT)
Dept: FAMILY MEDICINE CLINIC | Age: 61
End: 2021-02-26

## 2021-02-26 NOTE — PROGRESS NOTES
Order faxed to [facility].. Fax number [phone] confirmation number [id number].

## 2021-04-11 RX ORDER — ALLOPURINOL 100 MG/1
TABLET ORAL
Qty: 30 TAB | Refills: 1 | Status: SHIPPED | OUTPATIENT
Start: 2021-04-11 | End: 2021-05-17

## 2021-04-13 ENCOUNTER — TELEPHONE (OUTPATIENT)
Dept: FAMILY MEDICINE CLINIC | Age: 61
End: 2021-04-13

## 2021-04-13 NOTE — TELEPHONE ENCOUNTER
----- Message from Charisma Elizabeth sent at 4/13/2021  3:35 PM EDT -----  Regarding: Dr. Poornima Bell: [phone]  General Message/Vendor Calls    Caller's first and last name: N/A      Reason for call: Requesting Motrin or ibuprofen prescription for Hay Fever. Callback required yes/no and why: Yes/follow up       Best contact number(s): [phone]      Details to clarify the request: PT states she fell on 4/13/21 and is still experiencing pain in back and leg. PT requesting prescription for seasonal allergies as well. PT would like prescriptions today, if possible. Please send to [facility] on [facility] - phone: [phone].       Charisma Johnson

## 2021-04-14 NOTE — TELEPHONE ENCOUNTER
Allergy meds are OTC such as Zyrtec or Claritin or Allegra. If he had a fall and was having pain she wants to discuss treatment and she should make an appointment.

## 2021-04-14 NOTE — TELEPHONE ENCOUNTER
Pt is returning nurse's call. I read her the message from Dr Jayne Staples about otc medications and needs appt for leg pain. She did not want to make an appt. No need to call her back.

## 2021-04-22 DIAGNOSIS — Z79.4 TYPE 2 DIABETES MELLITUS WITH HYPERGLYCEMIA, WITH LONG-TERM CURRENT USE OF INSULIN (HCC): ICD-10-CM

## 2021-04-22 DIAGNOSIS — E11.65 TYPE 2 DIABETES MELLITUS WITH HYPERGLYCEMIA, WITH LONG-TERM CURRENT USE OF INSULIN (HCC): ICD-10-CM

## 2021-04-25 RX ORDER — (INSULIN DEGLUDEC AND LIRAGLUTIDE) 100; 3.6 [IU]/ML; MG/ML
INJECTION, SOLUTION SUBCUTANEOUS
Qty: 12 ML | Refills: 5 | Status: SHIPPED | OUTPATIENT
Start: 2021-04-25 | End: 2021-04-29 | Stop reason: SDUPTHER

## 2021-04-28 ENCOUNTER — TELEPHONE (OUTPATIENT)
Dept: FAMILY MEDICINE CLINIC | Age: 61
End: 2021-04-28

## 2021-04-28 NOTE — TELEPHONE ENCOUNTER
Pt has not have any insulin for a week now she said the pharm does not have the insulin please advise

## 2021-04-29 DIAGNOSIS — E11.65 TYPE 2 DIABETES MELLITUS WITH HYPERGLYCEMIA, WITH LONG-TERM CURRENT USE OF INSULIN (HCC): ICD-10-CM

## 2021-04-29 DIAGNOSIS — Z79.4 TYPE 2 DIABETES MELLITUS WITH HYPERGLYCEMIA, WITH LONG-TERM CURRENT USE OF INSULIN (HCC): ICD-10-CM

## 2021-04-29 RX ORDER — (INSULIN DEGLUDEC AND LIRAGLUTIDE) 100; 3.6 [IU]/ML; MG/ML
INJECTION, SOLUTION SUBCUTANEOUS
Qty: 12 ML | Refills: 5 | Status: SHIPPED | OUTPATIENT
Start: 2021-04-29 | End: 2021-04-29

## 2021-04-29 RX ORDER — (INSULIN DEGLUDEC AND LIRAGLUTIDE) 100; 3.6 [IU]/ML; MG/ML
INJECTION, SOLUTION SUBCUTANEOUS
Qty: 15 ML | Refills: 5 | Status: SHIPPED | OUTPATIENT
Start: 2021-04-29 | End: 2021-10-06

## 2021-04-29 NOTE — TELEPHONE ENCOUNTER
Script showing confirmed by pharmacy on 4/25/21. Called pharmacy and they verified that they received it, but that it says cancelled by prescriber on 4/27/21. Advised that we did not cancel.  Pharmacist states that they need the script to be resent

## 2021-04-29 NOTE — TELEPHONE ENCOUNTER
Called and spoke with patient. Advised that insulin was resent to the pharmacy. Patient verbalized understanding.

## 2021-05-14 DIAGNOSIS — E11.41 DIABETIC MONONEUROPATHY ASSOCIATED WITH TYPE 2 DIABETES MELLITUS (HCC): ICD-10-CM

## 2021-05-14 RX ORDER — GABAPENTIN 600 MG/1
600 TABLET ORAL 3 TIMES DAILY
Qty: 270 TAB | Refills: 3 | OUTPATIENT
Start: 2021-05-14

## 2021-05-17 RX ORDER — ALLOPURINOL 100 MG/1
TABLET ORAL
Qty: 30 TAB | Refills: 1 | Status: SHIPPED | OUTPATIENT
Start: 2021-05-17

## 2021-05-26 RX ORDER — DILTIAZEM HYDROCHLORIDE 240 MG/1
CAPSULE, COATED, EXTENDED RELEASE ORAL
Qty: 90 CAPSULE | Refills: 3 | Status: SHIPPED | OUTPATIENT
Start: 2021-05-26 | End: 2022-05-31 | Stop reason: SDUPTHER

## 2021-06-01 ENCOUNTER — TELEPHONE (OUTPATIENT)
Dept: FAMILY MEDICINE CLINIC | Age: 61
End: 2021-06-01

## 2021-06-02 ENCOUNTER — TELEPHONE (OUTPATIENT)
Dept: FAMILY MEDICINE CLINIC | Age: 61
End: 2021-06-02

## 2021-06-02 NOTE — TELEPHONE ENCOUNTER
Called and LVM for patient to call the office back. Gabapentin was denied because she needs to be seen. It is a controlled substance and was last seen 9/2020.

## 2021-06-02 NOTE — TELEPHONE ENCOUNTER
Pt is returning nurse's call. I read her the message from yesterday that needs appt to get refill of medications. I tried several times telling her appt is needed and I could make appt for her and she refused to make appt. This is just fyi to nurse.

## 2021-06-11 DIAGNOSIS — E11.41 DIABETIC MONONEUROPATHY ASSOCIATED WITH TYPE 2 DIABETES MELLITUS (HCC): ICD-10-CM

## 2021-06-11 RX ORDER — GABAPENTIN 600 MG/1
600 TABLET ORAL 3 TIMES DAILY
Qty: 270 TABLET | Refills: 3 | OUTPATIENT
Start: 2021-06-11

## 2021-06-11 NOTE — TELEPHONE ENCOUNTER
She has not been seen since September this is a controlled substance I cannot refill it at this time. She does already have an appointment scheduled for the 15th.

## 2021-07-06 ENCOUNTER — OFFICE VISIT (OUTPATIENT)
Dept: FAMILY MEDICINE CLINIC | Age: 61
End: 2021-07-06
Payer: MEDICARE

## 2021-07-06 VITALS
OXYGEN SATURATION: 92 % | SYSTOLIC BLOOD PRESSURE: 160 MMHG | BODY MASS INDEX: 37.17 KG/M2 | DIASTOLIC BLOOD PRESSURE: 79 MMHG | TEMPERATURE: 98.3 F | HEART RATE: 97 BPM | RESPIRATION RATE: 16 BRPM | WEIGHT: 202 LBS | HEIGHT: 62 IN

## 2021-07-06 DIAGNOSIS — E11.41 DIABETIC MONONEUROPATHY ASSOCIATED WITH TYPE 2 DIABETES MELLITUS (HCC): ICD-10-CM

## 2021-07-06 DIAGNOSIS — Z01.818 PRE-OP EVALUATION: Primary | ICD-10-CM

## 2021-07-06 DIAGNOSIS — E11.65 TYPE 2 DIABETES MELLITUS WITH HYPERGLYCEMIA, WITH LONG-TERM CURRENT USE OF INSULIN (HCC): ICD-10-CM

## 2021-07-06 DIAGNOSIS — H25.9 AGE-RELATED CATARACT OF BOTH EYES, UNSPECIFIED AGE-RELATED CATARACT TYPE: ICD-10-CM

## 2021-07-06 DIAGNOSIS — I25.10 CORONARY ARTERY DISEASE INVOLVING NATIVE CORONARY ARTERY OF NATIVE HEART WITHOUT ANGINA PECTORIS: ICD-10-CM

## 2021-07-06 DIAGNOSIS — Z79.4 TYPE 2 DIABETES MELLITUS WITH HYPERGLYCEMIA, WITH LONG-TERM CURRENT USE OF INSULIN (HCC): ICD-10-CM

## 2021-07-06 DIAGNOSIS — L40.3 PUSTULAR PSORIASIS OF PALMS AND SOLES: ICD-10-CM

## 2021-07-06 LAB
HBA1C MFR BLD HPLC: 6.5 %
HGB BLD-MCNC: NORMAL G/DL

## 2021-07-06 PROCEDURE — G8417 CALC BMI ABV UP PARAM F/U: HCPCS | Performed by: FAMILY MEDICINE

## 2021-07-06 PROCEDURE — 85018 HEMOGLOBIN: CPT | Performed by: FAMILY MEDICINE

## 2021-07-06 PROCEDURE — 83036 HEMOGLOBIN GLYCOSYLATED A1C: CPT | Performed by: FAMILY MEDICINE

## 2021-07-06 PROCEDURE — G8427 DOCREV CUR MEDS BY ELIG CLIN: HCPCS | Performed by: FAMILY MEDICINE

## 2021-07-06 PROCEDURE — 99214 OFFICE O/P EST MOD 30 MIN: CPT | Performed by: FAMILY MEDICINE

## 2021-07-06 PROCEDURE — 3017F COLORECTAL CA SCREEN DOC REV: CPT | Performed by: FAMILY MEDICINE

## 2021-07-06 PROCEDURE — G8753 SYS BP > OR = 140: HCPCS | Performed by: FAMILY MEDICINE

## 2021-07-06 PROCEDURE — G9717 DOC PT DX DEP/BP F/U NT REQ: HCPCS | Performed by: FAMILY MEDICINE

## 2021-07-06 PROCEDURE — G9899 SCRN MAM PERF RSLTS DOC: HCPCS | Performed by: FAMILY MEDICINE

## 2021-07-06 PROCEDURE — 2022F DILAT RTA XM EVC RTNOPTHY: CPT | Performed by: FAMILY MEDICINE

## 2021-07-06 PROCEDURE — G8754 DIAS BP LESS 90: HCPCS | Performed by: FAMILY MEDICINE

## 2021-07-06 PROCEDURE — 3044F HG A1C LEVEL LT 7.0%: CPT | Performed by: FAMILY MEDICINE

## 2021-07-06 RX ORDER — NITROGLYCERIN 0.4 MG/1
0.4 TABLET SUBLINGUAL
Qty: 1 BOTTLE | Refills: 5 | Status: SHIPPED | OUTPATIENT
Start: 2021-07-06 | End: 2021-10-29

## 2021-07-06 RX ORDER — TRIAMCINOLONE ACETONIDE 1 MG/G
CREAM TOPICAL
Qty: 45 G | Refills: 1 | Status: SHIPPED | OUTPATIENT
Start: 2021-07-06 | End: 2021-08-19

## 2021-07-06 RX ORDER — MINERAL OIL
ENEMA (ML) RECTAL
COMMUNITY

## 2021-07-06 RX ORDER — GUAIFENESIN, PSEUDOEPHEDRINE HYDROCHLORIDE 600; 60 MG/1; MG/1
1 TABLET, EXTENDED RELEASE ORAL EVERY 12 HOURS
COMMUNITY

## 2021-07-06 RX ORDER — GABAPENTIN 600 MG/1
600 TABLET ORAL 3 TIMES DAILY
Qty: 270 TABLET | Refills: 1 | Status: SHIPPED | OUTPATIENT
Start: 2021-07-06 | End: 2022-06-17 | Stop reason: SDUPTHER

## 2021-07-06 NOTE — PROGRESS NOTES
Damaris Reina is a 61 y.o. female is a 61 y.o. yo female who presents for preoperative evaluation. Pt for pre op for cataract removal, needs preop before they will schedule. Needs DGIF form for fishing license completed since disabled which I completed for her. DM has been well controlled, has neuropathy needs refill of gabapentin. Has been working well for her. She would like refill of nitroglycerin has never needed it since her stent placement. Latex Allergy:NO    History of anesthesia reaction: NO    History of PE/DVT:NO    Allergies   Allergen Reactions    Ciprofloxacin Nausea Only and Vertigo       Current Outpatient Medications   Medication Sig    PSEUDOEPHEDRINE-guaiFENesin (MUCINEX D)  mg per tablet Take 1 Tablet by mouth every twelve (12) hours.  fexofenadine (ALLEGRA) 180 mg tablet Take  by mouth.  nitroglycerin (NITROSTAT) 0.4 mg SL tablet 1 Tablet by SubLINGual route every five (5) minutes as needed for Chest Pain.  gabapentin (NEURONTIN) 600 mg tablet Take 1 Tablet by mouth three (3) times daily. Max Daily Amount: 1,800 mg.  triamcinolone acetonide (KENALOG) 0.1 % topical cream APPLY TO THE AFFECTED AREA TWICE DAILY.  USE THIN LAYER    dilTIAZem ER (CARDIZEM CD) 240 mg capsule TAKE 1 CAPSULE BY MOUTH EVERY DAY    allopurinoL (ZYLOPRIM) 100 mg tablet TAKE 1 TABLET BY MOUTH DAILY AS NEEDED FOR GOUT PAIN    insulin degludec-liraglutide (Xultophy 100/3.6) 100 unit-3.6 mg /mL (3 mL) inpn 40u subq qday    SITagliptin (Januvia) 100 mg tablet TAKE 1 TABLET BY MOUTH EVERY DAY    albuterol (PROVENTIL HFA, VENTOLIN HFA, PROAIR HFA) 90 mcg/actuation inhaler INHALE 2 PUFFS BY MOUTH EVERY 4 HOURS AS NEEDED    fluticasone propion-salmeteroL (Advair Diskus) 250-50 mcg/dose diskus inhaler INHALE 1 PUFF BY MOUTH TWICE DAILY wixela if preferred    rivaroxaban (Xarelto) 20 mg tab tablet TAKE 1 TABLET BY MOUTH EVERY DAY WITH DINNER    lisinopriL (PRINIVIL, ZESTRIL) 30 mg tablet Take 1 Tab by mouth daily.  OneTouch Ultra Blue Test Strip strip USE TO CHECK BLOOD GLUCOSE TWICE DAILY    rosuvastatin (CRESTOR) 20 mg tablet TAKE 1 TABLET BY MOUTH EVERY NIGHT    ondansetron (ZOFRAN ODT) 8 mg disintegrating tablet Take 1 Tab by mouth every eight (8) hours as needed for Nausea.  Blood-Glucose Meter monitoring kit Check BG bid on insulin E11.65    lancets misc Check BG bid on insulin E11.65    Insulin Needles, Disposable, (YOMI PEN NEEDLE) 32 gauge x 5/32\" ndle USE DAILY WITH TRESIBA    albuterol (PROVENTIL VENTOLIN) 2.5 mg /3 mL (0.083 %) nebulizer solution USE 1 VIAL VIA NEBULIZER Q 4 H PRF WHEEZING    aspirin delayed-release 81 mg tablet Take  by mouth daily.  Insulin Needles, Disposable, 31 gauge x 6/65\" ndle For application of insulin.  Insulin Syringes, Disposable, 1 mL syrg 1 Syringe by Does Not Apply route two (2) times a day. Before breakfast and 12 hours later.  colchicine (Colcrys) 0.6 mg tablet 1 tab PO once daily until gout symptoms resolved (Patient not taking: Reported on 7/6/2021)    tiotropium bromide (Spiriva Respimat) 2.5 mcg/actuation inhaler Take 2 Puffs by inhalation daily.  insulin aspart U-100 (NovoLOG Flexpen U-100 Insulin) 100 unit/mL (3 mL) inpn ADMINISTER 12 UNITS UNDER THE SKIN BEFORE MEALS (Patient not taking: Reported on 7/6/2021)    insulin degludec [employer] FlexTouch U-100) 100 unit/mL (3 mL) inpn INJECT 30 UNITS UNDER THE SKIN AT NIGHT (Patient not taking: Reported on 7/6/2021)    mupirocin (BACTROBAN) 2 % ointment Apply  to affected area daily. (Patient not taking: Reported on 7/6/2021)     No current facility-administered medications for this visit.        Patient Active Problem List   Diagnosis Code    DM (diabetes mellitus) ([facility] E11.9    HTN (hypertension) I10    COPD (chronic obstructive pulmonary disease) (HCC) J44.9    Depression F32.9    Sebaceous cyst L72.3    Diabetic neuropathy ([facility] E11.40    Migraine G43.909    Hypercholesteremia E78.00    Obesity (BMI 30-39. 9) E66.9    PVD (peripheral vascular disease) ([facility]) I73.9    Hyperglycemia due to type 2 diabetes mellitus ([facility] E11.65    Tobacco abuse Z72.0    Type 2 diabetes mellitus with hyperglycemia ([facility]) E11.65    Microalbuminuria R80.9    Coronary artery disease involving native coronary artery of native heart without angina pectoris I25.10    Severe obesity ([facility]) E66.01       Past Surgical History:   Procedure Laterality Date    HX GYN      HX OTHER SURGICAL      excision cyst/bilateral axilla    TX ANEURYSM, INTRACRAN, SIMPLE SURG         Reviewed PmHx, RxHx, FmHx, SocHx, AllgHx and updated and dated in the chart. Review of Systems - negative except as listed above in the HPI    Objective:     Vitals:    07/06/21 1359 07/06/21 1535   BP: (!) 175/91 (!) 160/79   Pulse: 97    Resp: 16    Temp: 98.3 °F (36.8 °C)    TempSrc: Oral    SpO2: 92%    Weight: 202 lb (91.6 kg)    Height: 5' 2\" (1.575 m)      Physical Examination: General appearance - alert, well appearing, and in no distress  Mental status - alert, oriented to person, place, and time  Chest - clear to auscultation, no wheezes, rales or rhonchi, symmetric air entry  Heart - normal rate, regular rhythm, normal S1, S2, no murmurs, rubs, clicks or gallops  Abdomen - soft, nontender, nondistended, no masses or organomegaly    Assessment/ Plan:   Diagnoses and all orders for this visit:    1. Pre-op evaluation  Hemoglobin A1c is 6.5 today, patient stable for her procedure. 2. Diabetic mononeuropathy associated with type 2 diabetes mellitus ([facility])  -     gabapentin (NEURONTIN) 600 mg tablet; Take 1 Tablet by mouth three (3) times daily. Max Daily Amount: 1,800 mg.    3. Pustular psoriasis of palms and soles  -     triamcinolone acetonide (KENALOG) 0.1 % topical cream; APPLY TO THE AFFECTED AREA TWICE DAILY. USE THIN LAYER    4.  Type 2 diabetes mellitus with hyperglycemia, with long-term current use of insulin ([facility])  -     AMB POC [id number]    5. Coronary artery disease involving native coronary artery of native heart without angina pectoris  -     nitroglycerin (NITROSTAT) 0.4 mg SL tablet; 1 Tablet by SubLINGual route every five (5) minutes as needed for Chest Pain. 6. Age-related cataract of both eyes, unspecified age-related cataract type  Stable for procedure form completed return to patient fax to ophthalmology     Follow-up and Dispositions    · Return in about 6 months (around 1/6/2022), or if symptoms worsen or fail to improve. I have discussed the diagnosis with the patient and the intended plan as seen in the above orders. The patient has received an after-visit summary and questions were answered concerning future plans. Pt conveyed understanding of plan.     Medication Side Effects and Warnings were discussed with patient      Leeann Mccloud,

## 2021-07-06 NOTE — LETTER
7/6/2021 3:00 PM    Ms. Shaun Hernandez  [address] [facility] [address]        Current Outpatient Medications on File Prior to Visit   Medication Sig Dispense Refill    PSEUDOEPHEDRINE-guaiFENesin (MUCINEX D)  mg per tablet Take 1 Tablet by mouth every twelve (12) hours.  fexofenadine (ALLEGRA) 180 mg tablet Take  by mouth.  dilTIAZem ER (CARDIZEM CD) 240 mg capsule TAKE 1 CAPSULE BY MOUTH EVERY DAY 90 Capsule 3    allopurinoL (ZYLOPRIM) 100 mg tablet TAKE 1 TABLET BY MOUTH DAILY AS NEEDED FOR GOUT PAIN 30 Tab 1    insulin degludec-liraglutide (Xultophy 100/3.6) 100 unit-3.6 mg /mL (3 mL) inpn 40u subq qday 15 mL 5    SITagliptin (Januvia) 100 mg tablet TAKE 1 TABLET BY MOUTH EVERY DAY 90 Tab 4    albuterol (PROVENTIL HFA, VENTOLIN HFA, PROAIR HFA) 90 mcg/actuation inhaler INHALE 2 PUFFS BY MOUTH EVERY 4 HOURS AS NEEDED 18 g 5    fluticasone propion-salmeteroL (Advair Diskus) 250-50 mcg/dose diskus inhaler INHALE 1 PUFF BY MOUTH TWICE DAILY wixela if preferred 3 Each 3    rivaroxaban (Xarelto) 20 mg tab tablet TAKE 1 TABLET BY MOUTH EVERY DAY WITH DINNER 30 Tab 11    lisinopriL (PRINIVIL, ZESTRIL) 30 mg tablet Take 1 Tab by mouth daily. 90 Tab 4    OneTouch Ultra Blue Test Strip strip USE TO CHECK BLOOD GLUCOSE TWICE DAILY 200 Strip 3    rosuvastatin (CRESTOR) 20 mg tablet TAKE 1 TABLET BY MOUTH EVERY NIGHT 90 Tab 3    ondansetron (ZOFRAN ODT) 8 mg disintegrating tablet Take 1 Tab by mouth every eight (8) hours as needed for Nausea. 15 Tab 1    Blood-Glucose Meter monitoring kit Check BG bid on insulin E11.65 1 Kit 0    lancets misc Check BG bid on insulin E11.65 200 Each 3    Insulin Needles, Disposable, (YOMI PEN NEEDLE) 32 gauge x 5/32\" ndle USE DAILY WITH TRESIBA 100 Pen Needle 11    albuterol (PROVENTIL VENTOLIN) 2.5 mg /3 mL (0.083 %) nebulizer solution USE 1 VIAL VIA NEBULIZER Q 4 H PRF WHEEZING  0    aspirin delayed-release 81 mg tablet Take  by mouth daily.       Insulin Needles, Disposable, 31 gauge x 0/67\" ndle For application of insulin. 1 Package 11    Insulin Syringes, Disposable, 1 mL syrg 1 Syringe by Does Not Apply route two (2) times a day. Before breakfast and 12 hours later. 60 Syringe 3    colchicine (Colcrys) 0.6 mg tablet 1 tab PO once daily until gout symptoms resolved (Patient not taking: Reported on 7/6/2021) 30 Tab 2    [DISCONTINUED] gabapentin (NEURONTIN) 600 mg tablet Take 1 Tab by mouth three (3) times daily. Max Daily Amount: 1,800 mg. 270 Tab 3    [DISCONTINUED] triamcinolone acetonide (KENALOG) 0.1 % topical cream APPLY TO THE AFFECTED AREA TWICE DAILY. USE THIN LAYER 45 g 1    tiotropium bromide (Spiriva Respimat) 2.5 mcg/actuation inhaler Take 2 Puffs by inhalation daily. 1 Inhaler 11    insulin aspart U-100 (NovoLOG Flexpen U-100 Insulin) 100 unit/mL (3 mL) inpn ADMINISTER 12 UNITS UNDER THE SKIN BEFORE MEALS (Patient not taking: Reported on 7/6/2021) 15 mL 5    insulin degludec [employer] FlexTouch U-100) 100 unit/mL (3 mL) inpn INJECT 30 UNITS UNDER THE SKIN AT NIGHT (Patient not taking: Reported on 7/6/2021) 18 mL 1    [DISCONTINUED] nitroglycerin (NITROSTAT) 0.4 mg SL tablet 1 Tab by SubLINGual route every five (5) minutes as needed for Chest Pain. 1 Bottle 5    mupirocin (BACTROBAN) 2 % ointment Apply  to affected area daily. (Patient not taking: Reported on 7/6/2021) 22 g 0     No current facility-administered medications on file prior to visit.            Sincerely,      Yenifer Conn, DO

## 2021-07-06 NOTE — PATIENT INSTRUCTIONS
A Healthy Lifestyle: Care Instructions  Your Care Instructions     A healthy lifestyle can help you feel good, stay at a healthy weight, and have plenty of energy for both work and play. A healthy lifestyle is something you can share with your whole family. A healthy lifestyle also can lower your risk for serious health problems, such as high blood pressure, heart disease, and diabetes. You can follow a few steps listed below to improve your health and the health of your family. Follow-up care is a key part of your treatment and safety. Be sure to make and go to all appointments, and call your doctor if you are having problems. It's also a good idea to know your test results and keep a list of the medicines you take. How can you care for yourself at home? · Do not eat too much sugar, fat, or fast foods. You can still have dessert and treats now and then. The goal is moderation. · Start small to improve your eating habits. Pay attention to portion sizes, drink less juice and soda pop, and eat more fruits and vegetables. ? Eat a healthy amount of food. A 3-ounce serving of meat, for example, is about the size of a deck of cards. Fill the rest of your plate with vegetables and whole grains. ? Limit the amount of soda and sports drinks you have every day. Drink more water when you are thirsty. ? Eat plenty of fruits and vegetables every day. Have an apple or some carrot sticks as an afternoon snack instead of a candy bar. Try to have fruits and/or vegetables at every meal.  · Make exercise part of your daily routine. You may want to start with simple activities, such as walking, bicycling, or slow swimming. Try to be active 30 to 60 minutes every day. You do not need to do all 30 to 60 minutes all at once. For example, you can exercise 3 times a day for 10 or 20 minutes.  Moderate exercise is safe for most people, but it is always a good idea to talk to your doctor before starting an exercise program.  · Keep moving. Sid Sandifer the lawn, work in the garden, or [employer]. Take the stairs instead of the elevator at work. · If you smoke, quit. People who smoke have an increased risk for heart attack, stroke, cancer, and other lung illnesses. Quitting is hard, but there are ways to boost your chance of quitting tobacco for good. ? Use nicotine gum, patches, or lozenges. ? Ask your doctor about stop-smoking programs and medicines. ? Keep trying. In addition to reducing your risk of diseases in the future, you will notice some benefits soon after you stop using tobacco. If you have shortness of breath or asthma symptoms, they will likely get better within a few weeks after you quit. · Limit how much alcohol you drink. Moderate amounts of alcohol (up to 2 drinks a day for men, 1 drink a day for women) are okay. But drinking too much can lead to liver problems, high blood pressure, and other health problems. Family health  If you have a family, there are many things you can do together to improve your health. · Eat meals together as a family as often as possible. · Eat healthy foods. This includes fruits, vegetables, lean meats and dairy, and whole grains. · Include your family in your fitness plan. Most people think of activities such as jogging or tennis as the way to fitness, but there are many ways you and your family can be more active. Anything that makes you breathe hard and gets your heart pumping is exercise. Here are some tips:  ? Walk to do errands or to take your child to school or the bus.  ? Go for a family bike ride after dinner instead of watching TV. Where can you learn more? Go to http://www.gray.com/  Enter H494 in the search box to learn more about \"A Healthy Lifestyle: Care Instructions. \"  Current as of: September 23, 2020               Content Version: 12.8  © 4485-6148 [employer]wise[employer].    Care instructions adapted under license by [employer] (which disclaims liability or warranty for this information). If you have questions about a medical condition or this instruction, always ask your healthcare professional. [address] any warranty or liability for your use of this information.

## 2021-07-06 NOTE — PROGRESS NOTES
Chief Complaint   Patient presents with    Pre-op Exam     Patient presents in office today for pre-op clearance to cataracts removal.  States that she does not have the surgery scheduled. Made aware that if the surgery date is more than 30 days out that she will have to come back for another pre-op. Needs a refill of gabapentin   No other concerns. 1. Have you been to the ER, urgent care clinic since your last visit? Hospitalized since your last visit? No    2. Have you seen or consulted any other health care providers outside of the [address] [place] [address] since your last visit? Include any pap smears or colon screening.  No    Learning Assessment 3/29/2018   PRIMARY LEARNER Patient   HIGHEST LEVEL OF EDUCATION - PRIMARY LEARNER  GRADUATED HIGH SCHOOL OR GED   BARRIERS PRIMARY LEARNER NONE   CO-LEARNER CAREGIVER No   PRIMARY LANGUAGE ENGLISH   LEARNER PREFERENCE PRIMARY DEMONSTRATION   ANSWERED BY Patient   RELATIONSHIP SELF

## 2021-07-07 ENCOUNTER — DOCUMENTATION ONLY (OUTPATIENT)
Dept: FAMILY MEDICINE CLINIC | Age: 61
End: 2021-07-07

## 2021-07-07 NOTE — PROGRESS NOTES
Pre-op form faxed to [facility]. Fax number [phone] confirmation number [id number].

## 2021-07-09 ENCOUNTER — DOCUMENTATION ONLY (OUTPATIENT)
Dept: FAMILY MEDICINE CLINIC | Age: 61
End: 2021-07-09

## 2021-07-09 DIAGNOSIS — I48.0 PAROXYSMAL ATRIAL FIBRILLATION (HCC): Primary | ICD-10-CM

## 2021-07-16 ENCOUNTER — DOCUMENTATION ONLY (OUTPATIENT)
Dept: FAMILY MEDICINE CLINIC | Age: 61
End: 2021-07-16

## 2021-07-16 NOTE — PROGRESS NOTES
Anticoagulant instructions faxed to [address] [facility]. Fax number [phone] confirmation number [id number].

## 2021-08-12 ENCOUNTER — TELEPHONE (OUTPATIENT)
Dept: FAMILY MEDICINE CLINIC | Age: 61
End: 2021-08-12

## 2021-08-12 ENCOUNTER — TRANSCRIBE ORDER (OUTPATIENT)
Dept: SCHEDULING | Age: 61
End: 2021-08-12

## 2021-08-12 DIAGNOSIS — Z12.31 SCREENING MAMMOGRAM FOR HIGH-RISK PATIENT: Primary | ICD-10-CM

## 2021-08-12 NOTE — TELEPHONE ENCOUNTER
Called and LVM for patient to call the office back. Dr. Heidi Huerta received a fax with the result of a FIT test that she had submitted that came back positive.  Will need to see GI for a colonoscopy

## 2021-08-12 NOTE — TELEPHONE ENCOUNTER
RC to patient. Advised that we received a fax with the results of her FIT test that was positive. Advised that she will need to get a colonoscopy done. Patient verbalized understanding. Gave her the number to Yudi.

## 2021-08-19 DIAGNOSIS — I25.10 CORONARY ARTERY DISEASE INVOLVING NATIVE CORONARY ARTERY OF NATIVE HEART WITHOUT ANGINA PECTORIS: ICD-10-CM

## 2021-08-19 DIAGNOSIS — L40.3 PUSTULAR PSORIASIS OF PALMS AND SOLES: ICD-10-CM

## 2021-08-19 RX ORDER — TRIAMCINOLONE ACETONIDE 1 MG/G
CREAM TOPICAL
Qty: 45 G | Refills: 1 | Status: SHIPPED | OUTPATIENT
Start: 2021-08-19 | End: 2021-10-06

## 2021-08-19 RX ORDER — ROSUVASTATIN CALCIUM 20 MG/1
TABLET, COATED ORAL
Qty: 90 TABLET | Refills: 3 | Status: SHIPPED | OUTPATIENT
Start: 2021-08-19 | End: 2022-06-09 | Stop reason: SDUPTHER

## 2021-08-24 ENCOUNTER — TELEPHONE (OUTPATIENT)
Dept: FAMILY MEDICINE CLINIC | Age: 61
End: 2021-08-24

## 2021-08-30 ENCOUNTER — DOCUMENTATION ONLY (OUTPATIENT)
Dept: FAMILY MEDICINE CLINIC | Age: 61
End: 2021-08-30

## 2021-09-08 ENCOUNTER — DOCUMENTATION ONLY (OUTPATIENT)
Dept: FAMILY MEDICINE CLINIC | Age: 61
End: 2021-09-08

## 2021-09-08 NOTE — PROGRESS NOTES
Faxed 07/06/21 office note/lab results to Dr Fartun Major per [facility] request. Fax #[phone] confirmation received.

## 2021-09-15 ENCOUNTER — TELEPHONE (OUTPATIENT)
Dept: FAMILY MEDICINE CLINIC | Age: 61
End: 2021-09-15

## 2021-09-15 DIAGNOSIS — Z79.4 TYPE 2 DIABETES MELLITUS WITH HYPERGLYCEMIA, WITH LONG-TERM CURRENT USE OF INSULIN (HCC): ICD-10-CM

## 2021-09-15 DIAGNOSIS — E11.65 TYPE 2 DIABETES MELLITUS WITH HYPERGLYCEMIA, WITH LONG-TERM CURRENT USE OF INSULIN (HCC): ICD-10-CM

## 2021-09-15 RX ORDER — LANCETS
EACH MISCELLANEOUS
Qty: 200 EACH | Refills: 3 | Status: CANCELLED | OUTPATIENT
Start: 2021-09-15

## 2021-09-15 RX ORDER — PEN NEEDLE, DIABETIC 30 GX3/16"
NEEDLE, DISPOSABLE MISCELLANEOUS
Status: CANCELLED | OUTPATIENT
Start: 2021-09-15

## 2021-09-15 RX ORDER — INSULIN PUMP SYRINGE, 3 ML
EACH MISCELLANEOUS
Qty: 1 KIT | Refills: 0 | Status: CANCELLED | OUTPATIENT
Start: 2021-09-15

## 2021-09-15 NOTE — TELEPHONE ENCOUNTER
Spoke with yuri from [address] [place] [address] and she states she was calling to confirm our fax number. Informed per psr that our fax has been down. She states she will still try and fax over order for pt's diabetic supplies and also send an electronic request to dr Modesto Mattson. Their fax number is [phone] and their phone number is [phone].  Sending to provider so he is aware

## 2021-09-15 NOTE — TELEPHONE ENCOUNTER
Called and LVM for patient to call the office back. [address] [place] [address] and gave alternate fax number of [phone] to the fax machine beside me.

## 2021-09-15 NOTE — TELEPHONE ENCOUNTER
Can you find out what supplies she needs? Does she need a new meter, strips, lancets?   Where does she need it sent to?

## 2021-09-16 DIAGNOSIS — I48.0 PAROXYSMAL ATRIAL FIBRILLATION (HCC): ICD-10-CM

## 2021-09-22 ENCOUNTER — HOSPITAL ENCOUNTER (EMERGENCY)
Age: 61
Discharge: HOME | End: 2021-09-23
Attending: STUDENT IN AN ORGANIZED HEALTH CARE EDUCATION/TRAINING PROGRAM
Payer: MEDICARE

## 2021-09-22 VITALS
HEIGHT: 62 IN | BODY MASS INDEX: 37.17 KG/M2 | DIASTOLIC BLOOD PRESSURE: 72 MMHG | TEMPERATURE: 97.5 F | WEIGHT: 202 LBS | SYSTOLIC BLOOD PRESSURE: 160 MMHG | OXYGEN SATURATION: 96 % | RESPIRATION RATE: 18 BRPM | HEART RATE: 64 BPM

## 2021-09-22 DIAGNOSIS — M25.572 ACUTE LEFT ANKLE PAIN: Primary | ICD-10-CM

## 2021-09-22 DIAGNOSIS — S93.402A SPRAIN OF LEFT ANKLE, UNSPECIFIED LIGAMENT, INITIAL ENCOUNTER: ICD-10-CM

## 2021-09-22 DIAGNOSIS — S06.0X1A CONCUSSION WITH LOSS OF CONSCIOUSNESS OF 30 MINUTES OR LESS, INITIAL ENCOUNTER: ICD-10-CM
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
## 2021-09-22 PROCEDURE — 99284 EMERGENCY DEPT VISIT MOD MDM: CPT

## 2021-09-22 NOTE — Clinical Note
[address]  [facility] EMERGENCY DEPT  [address] [id number]  [phone]    Work/School Note    Date: 9/22/2021    To Whom It May concern:      Karyle Hug was seen and treated today in the emergency room by the following provider(s):  Attending Provider: Gina Robbins MD.      Karyle Hug is excused from work/school on 09/23/21. She is clear to return to work/school on 09/24/21. Sincerely,          Fabian Yanes MD

## 2021-09-23 ENCOUNTER — APPOINTMENT (OUTPATIENT)
Dept: GENERAL RADIOLOGY | Age: 61
End: 2021-09-23
Attending: STUDENT IN AN ORGANIZED HEALTH CARE EDUCATION/TRAINING PROGRAM
Payer: MEDICARE

## 2021-09-23 ENCOUNTER — APPOINTMENT (OUTPATIENT)
Dept: CT IMAGING | Age: 61
End: 2021-09-23
Attending: STUDENT IN AN ORGANIZED HEALTH CARE EDUCATION/TRAINING PROGRAM
Payer: MEDICARE

## 2021-09-23 DIAGNOSIS — I10 ESSENTIAL HYPERTENSION: ICD-10-CM

## 2021-09-23 LAB
ALBUMIN SERPL-MCNC: 3.1 G/DL (ref 3.5–5)
ALBUMIN/GLOB SERPL: 0.7 {RATIO} (ref 1.1–2.2)
ALP SERPL-CCNC: 99 U/L (ref 45–117)
ALT SERPL-CCNC: 23 U/L (ref 12–78)
ANION GAP SERPL CALC-SCNC: 1 MMOL/L (ref 5–15)
AST SERPL-CCNC: 20 U/L (ref 15–37)
ATRIAL RATE: 144 BPM
BASOPHILS # BLD: 0.1 K/UL (ref 0–0.1)
BASOPHILS NFR BLD: 1 % (ref 0–1)
BILIRUB SERPL-MCNC: 0.3 MG/DL (ref 0.2–1)
BUN SERPL-MCNC: 18 MG/DL (ref 6–20)
BUN/CREAT SERPL: 17 (ref 12–20)
CALCIUM SERPL-MCNC: 8.3 MG/DL (ref 8.5–10.1)
CALCULATED P AXIS, ECG09: 33 DEGREES
CALCULATED R AXIS, ECG10: 34 DEGREES
CALCULATED T AXIS, ECG11: 151 DEGREES
CHLORIDE SERPL-SCNC: 107 MMOL/L (ref 97–108)
CO2 SERPL-SCNC: 28 MMOL/L (ref 21–32)
CREAT SERPL-MCNC: 1.09 MG/DL (ref 0.55–1.02)
DIAGNOSIS, 93000: NORMAL
DIFFERENTIAL METHOD BLD: ABNORMAL
EOSINOPHIL # BLD: 0.3 K/UL (ref 0–0.4)
EOSINOPHIL NFR BLD: 2 % (ref 0–7)
ERYTHROCYTE [DISTWIDTH] IN BLOOD BY AUTOMATED COUNT: 14.9 % (ref 11.5–14.5)
GLOBULIN SER CALC-MCNC: 4.2 G/DL (ref 2–4)
GLUCOSE SERPL-MCNC: 207 MG/DL (ref 65–100)
HCT VFR BLD AUTO: 37.7 % (ref 35–47)
HEMOCCULT STL QL: POSITIVE
HGB BLD-MCNC: 12.2 G/DL (ref 11.5–16)
IMM GRANULOCYTES # BLD AUTO: 0 K/UL (ref 0–0.04)
IMM GRANULOCYTES NFR BLD AUTO: 0 % (ref 0–0.5)
INR PPP: 1.1 (ref 0.9–1.1)
LYMPHOCYTES # BLD: 3.6 K/UL (ref 0.8–3.5)
LYMPHOCYTES NFR BLD: 29 % (ref 12–49)
MCH RBC QN AUTO: 30 PG (ref 26–34)
MCHC RBC AUTO-ENTMCNC: 32.4 G/DL (ref 30–36.5)
MCV RBC AUTO: 92.9 FL (ref 80–99)
MONOCYTES # BLD: 0.7 K/UL (ref 0–1)
MONOCYTES NFR BLD: 6 % (ref 5–13)
NEUTS SEG # BLD: 7.9 K/UL (ref 1.8–8)
NEUTS SEG NFR BLD: 62 % (ref 32–75)
NRBC # BLD: 0 K/UL (ref 0–0.01)
NRBC BLD-RTO: 0 PER 100 WBC
PLATELET # BLD AUTO: 299 K/UL (ref 150–400)
PMV BLD AUTO: 10.8 FL (ref 8.9–12.9)
POTASSIUM SERPL-SCNC: 4.6 MMOL/L (ref 3.5–5.1)
PROT SERPL-MCNC: 7.3 G/DL (ref 6.4–8.2)
PROTHROMBIN TIME: 11 SEC (ref 9–11.1)
Q-T INTERVAL, ECG07: 328 MS
QRS DURATION, ECG06: 82 MS
QTC CALCULATION (BEZET), ECG08: 439 MS
RBC # BLD AUTO: 4.06 M/UL (ref 3.8–5.2)
SODIUM SERPL-SCNC: 136 MMOL/L (ref 136–145)
TROPONIN I SERPL-MCNC: <0.05 NG/ML
VENTRICULAR RATE, ECG03: 108 BPM
WBC # BLD AUTO: 12.6 K/UL (ref 3.6–11)

## 2021-09-23 PROCEDURE — 73610 X-RAY EXAM OF ANKLE: CPT

## 2021-09-23 PROCEDURE — 85610 PROTHROMBIN TIME: CPT

## 2021-09-23 PROCEDURE — 84484 ASSAY OF TROPONIN QUANT: CPT

## 2021-09-23 PROCEDURE — 74011250636 HC RX REV CODE- 250/636: Performed by: STUDENT IN AN ORGANIZED HEALTH CARE EDUCATION/TRAINING PROGRAM

## 2021-09-23 PROCEDURE — 70450 CT HEAD/BRAIN W/O DYE: CPT

## 2021-09-23 PROCEDURE — 96375 TX/PRO/DX INJ NEW DRUG ADDON: CPT

## 2021-09-23 PROCEDURE — 80053 COMPREHEN METABOLIC PANEL: CPT

## 2021-09-23 PROCEDURE — 85025 COMPLETE CBC W/AUTO DIFF WBC: CPT

## 2021-09-23 PROCEDURE — 36415 COLL VENOUS BLD VENIPUNCTURE: CPT

## 2021-09-23 PROCEDURE — 96374 THER/PROPH/DIAG INJ IV PUSH: CPT

## 2021-09-23 PROCEDURE — 74011250637 HC RX REV CODE- 250/637: Performed by: STUDENT IN AN ORGANIZED HEALTH CARE EDUCATION/TRAINING PROGRAM

## 2021-09-23 PROCEDURE — 93005 ELECTROCARDIOGRAM TRACING: CPT

## 2021-09-23 PROCEDURE — 71045 X-RAY EXAM CHEST 1 VIEW: CPT

## 2021-09-23 PROCEDURE — 82272 OCCULT BLD FECES 1-3 TESTS: CPT

## 2021-09-23 RX ORDER — LISINOPRIL 30 MG/1
30 TABLET ORAL DAILY
Qty: 90 TABLET | Refills: 4 | Status: SHIPPED | OUTPATIENT
Start: 2021-09-23 | End: 2022-06-09 | Stop reason: SDUPTHER

## 2021-09-23 RX ORDER — OXYCODONE AND ACETAMINOPHEN 5; 325 MG/1; MG/1
1 TABLET ORAL
Status: COMPLETED | OUTPATIENT
Start: 2021-09-23 | End: 2021-09-23

## 2021-09-23 RX ORDER — ONDANSETRON 2 MG/ML
4 INJECTION INTRAMUSCULAR; INTRAVENOUS
Status: COMPLETED | OUTPATIENT
Start: 2021-09-23 | End: 2021-09-23

## 2021-09-23 RX ORDER — OXYCODONE AND ACETAMINOPHEN 5; 325 MG/1; MG/1
1 TABLET ORAL
Qty: 20 TABLET | Refills: 0 | Status: SHIPPED | OUTPATIENT
Start: 2021-09-23 | End: 2021-09-28

## 2021-09-23 RX ORDER — MORPHINE SULFATE 10 MG/ML
6 INJECTION, SOLUTION INTRAMUSCULAR; INTRAVENOUS
Status: COMPLETED | OUTPATIENT
Start: 2021-09-23 | End: 2021-09-23

## 2021-09-23 RX ADMIN — ONDANSETRON 4 MG: 2 INJECTION INTRAMUSCULAR; INTRAVENOUS at 03:27

## 2021-09-23 RX ADMIN — MORPHINE SULFATE 6 MG: 10 INJECTION INTRAVENOUS at 03:27

## 2021-09-23 RX ADMIN — OXYCODONE HYDROCHLORIDE AND ACETAMINOPHEN 1 TABLET: 5; 325 TABLET ORAL at 02:05

## 2021-09-23 NOTE — ED NOTES
Patient seen by provider prior to discharge and no further questions. Discharge papers given to patient by RN. Wheelchair and crutches to home and no apparent distress.

## 2021-09-23 NOTE — DISCHARGE INSTRUCTIONS
You presented to the ED with multiple concerns. You have had rectal bleeding for approximately 1 month. Your Hemoccult/microscopic stool examination showed positive for blood. However hemoglobin is well within normal limits for a woman your age. No signs of anemia. Please keep your follow-up appointment with GI. Please call them and update them on your recent ED visit to see if they can see you sooner. Additionally you fell and hit your head, a CT scan was obtained because you are on anticoagulation. No bleeds were identified. You may have a concussion from your fall. Please see concussion information your discharge paperwork. You also endorsed syncope. Your chest x-ray was unremarkable. Your EKG did show a Mobitz type I second-degree AV block. These are usually asymptomatic however he did have a syncopal episode so following up with her cardiologist is indicated. You appeared to have a significant left ankle sprain. No fractures on x-ray. Air splint and crutches were provided. Oral pain medication sent to your pharmacy.   If pain not improving follow-up with orthopedics as recommended in your discharge paperwork

## 2021-09-23 NOTE — ED PROVIDER NOTES
Patient has had rectal bleeding for 1 month and feels she is getting weaker and weaker. She passed out at 830pm and was unconscious for an unknown amount of time. She reports hitting her elbow and the back of her head. She also reports she has severe left ankle pain. Hx of afib on Xeralto. Has GI appointment in October in [facility] [place] to follow-up rectal bleeding. Patient states she feels fatigued. .     The history is provided by the patient, medical records and a relative. Syncope   This is a new problem. The current episode started 6 to 12 hours ago. The problem occurs rarely. The problem has been resolved. She lost consciousness for a period of 1 to 5 minutes (Unknown but suspect less than 5 minutes). The problem is associated with normal activity. Associated symptoms include malaise/fatigue and anal bleeding. Pertinent negatives include no visual change, no chest pain, no palpitations, no fever, no abdominal pain, no congestion, no headaches and no back pain. She has tried nothing for the symptoms. The treatment provided no relief. Her past medical history is significant for DM and HTN. Rectal Bleeding   This is a chronic problem. The current episode started more than 1 week ago. The stool is described as blood tinged. Pertinent negatives include no abdominal pain, no fever and no back pain. Foot Pain   This is a new problem. The current episode started 6 to 12 hours ago. The problem occurs constantly. The problem has not changed since onset. The pain is present in the left ankle. The quality of the pain is described as sharp. The pain is at a severity of 10/10. The pain is severe. Pertinent negatives include no back pain. The symptoms are aggravated by standing and movement. She has tried nothing for the symptoms. The treatment provided no relief. There has been a history of trauma.         Past Medical History:   Diagnosis Date    Aneurysm ([address]     cerebral    Anxiety     Asthma     Depression     Diabetes ([facility]     Headache(784.0)     Hypercholesterolemia     Hypertension     Other ill-defined conditions(799.89)     high cholesterol    Other ill-defined conditions(799.89)     pvd    Stroke ([facility] 2006    no residual       Past Surgical History:   Procedure Laterality Date    HX GYN      HX OTHER SURGICAL      excision cyst/bilateral axilla    ND ANEURYSM, INTRACRAN, SIMPLE SURG           Family History:   Problem Relation Age of Onset    Hypertension Mother     Cancer Father     Breast Cancer Maternal Aunt     Breast Cancer Maternal Aunt        Social History     Socioeconomic History    Marital status: SINGLE     Spouse name: Not on file    Number of children: Not on file    Years of education: Not on file    Highest education level: Not on file   Occupational History    Not on file   Tobacco Use    Smoking status: Current Every Day Smoker     Packs/day: 1.00     Years: 35.00     Pack years: 35.00    Smokeless tobacco: Never Used   Substance and Sexual Activity    Alcohol use: Yes     Comment: occasional    Drug use: No    Sexual activity: Not on file   Other Topics Concern    Not on file   Social History Narrative    Not on file     Social Determinants of Health     Financial Resource Strain:     Difficulty of Paying Living Expenses:    Food Insecurity:     Worried About Running Out of Food in the Last Year:     Ran Out of Food in the Last Year:    Transportation Needs:     Lack of Transportation (Medical):      Lack of Transportation (Non-Medical):    Physical Activity:     Days of Exercise per Week:     Minutes of Exercise per Session:    Stress:     Feeling of Stress :    Social Connections:     Frequency of Communication with Friends and Family:     Frequency of Social Gatherings with Friends and Family:     Attends Jainism Services:     Active Member of Clubs or Organizations:     Attends Club or Organization Meetings:     Marital Status:    Intimate Partner Violence:     Fear of Current or Ex-Partner:     Emotionally Abused:     Physically Abused:     Sexually Abused: ALLERGIES: Ciprofloxacin    Review of Systems   Constitutional: Positive for malaise/fatigue. Negative for fever. HENT: Negative. Negative for congestion. Eyes: Negative. Respiratory: Negative. Cardiovascular: Positive for syncope. Negative for chest pain and palpitations. Gastrointestinal: Positive for anal bleeding. Negative for abdominal pain. Endocrine: Negative. Genitourinary: Negative. Musculoskeletal: Positive for arthralgias. Negative for back pain. Skin: Negative. Allergic/Immunologic: Negative. Neurological: Negative for headaches. Hematological: Negative. Psychiatric/Behavioral: Negative. Vitals:    09/22/21 2249   BP: (!) 160/72   Pulse: 64   Resp: 18   Temp: 97.5 °F (36.4 °C)   SpO2: 96%   Weight: 91.6 kg (202 lb)   Height: 5' 2\" (1.575 m)            Physical Exam  Vitals and nursing note reviewed. Constitutional:       General: She is not in acute distress. Appearance: Normal appearance. She is obese. She is ill-appearing. HENT:      Head: Normocephalic and atraumatic. Right Ear: External ear normal.      Left Ear: External ear normal.      Nose: Nose normal.      Mouth/Throat:      Mouth: Mucous membranes are moist.      Pharynx: Oropharynx is clear. No posterior oropharyngeal erythema. Eyes:      Extraocular Movements: Extraocular movements intact. Conjunctiva/sclera: Conjunctivae normal.   Cardiovascular:      Rate and Rhythm: Normal rate. Pulses: Normal pulses. Heart sounds: Normal heart sounds. Pulmonary:      Effort: Pulmonary effort is normal.      Breath sounds: Normal breath sounds. Chest:      Chest wall: No deformity or tenderness. Abdominal:      General: Abdomen is flat. There is no distension. Tenderness: There is no abdominal tenderness.    Genitourinary:     Rectum: Normal. Guaiac result positive. Musculoskeletal:         General: No deformity or signs of injury. Normal range of motion. Cervical back: Normal range of motion and neck supple. No tenderness. Skin:     General: Skin is warm and dry. Capillary Refill: Capillary refill takes less than 2 seconds. Neurological:      General: No focal deficit present. Mental Status: She is alert and oriented to person, place, and time. Cranial Nerves: No cranial nerve deficit. Sensory: No sensory deficit. Motor: No weakness. Gait: Gait abnormal ( Due to severe left ankle pain). Psychiatric:         Mood and Affect: Mood normal.         Behavior: Behavior normal.          MDM       LABORATORY RESULTS:  Labs Reviewed   CBC WITH AUTOMATED DIFF - Abnormal; Notable for the following components:       Result Value    WBC 12.6 (*)     RDW 14.9 (*)     ABS. LYMPHOCYTES 3.6 (*)     All other components within normal limits   METABOLIC PANEL, COMPREHENSIVE - Abnormal; Notable for the following components:    Anion gap 1 (*)     Glucose 207 (*)     Creatinine 1.09 (*)     GFR est non-AA 51 (*)     Calcium 8.3 (*)     Albumin 3.1 (*)     Globulin 4.2 (*)     A-G Ratio 0.7 (*)     All other components within normal limits   OCCULT BLOOD, STOOL - Abnormal; Notable for the following components:    Occult blood, stool Positive (*)     All other components within normal limits   PROTHROMBIN TIME + INR   TROPONIN I   SAMPLES BEING HELD       IMAGING RESULTS:  CT HEAD WO CONT   Final Result      No acute traumatic injury. XR ANKLE LT MIN 3 V   Final Result   No acute fracture. Diffuse soft tissue swelling. XR CHEST SNGL V   Final Result   No acute process.           MEDICATIONS GIVEN:  Medications   oxyCODONE-acetaminophen (PERCOCET) 5-325 mg per tablet 1 Tablet (1 Tablet Oral Given 9/23/21 0205)   morphine 10 mg/mL injection 6 mg (6 mg IntraVENous Given 9/23/21 0327)   ondansetron (ZOFRAN) injection 4 mg (4 mg IntraVENous Given 9/23/21 9827)       Differential diagnosis: Syncope, ankle fracture, ankle sprain, concussion, head bleed, electrolyte abnormality, arrhythmia, anemia, GI bleed    ED physician interpretation of imaging: CT head without acute bleeds, ankle without fractures. ED physician interpretation of EKG: No STEMI. Rhythm: sinus tachycardia, PVC's and PAC's; and regular . Rate (approx.): 108;EKG documented and interpreted by Jose Chen MD  ED physician interpretation of laboratory results: Patient's fecal occult stool positive. No gross blood red on exam.  Patient's hemoglobin with normal limits, doubt symptomatic or serious bleed as patient endorses 1 month of bleeding and has maintained a normal hemoglobin. Patient has hyperglycemia without DKA. Patient mild elevation in serum creatinine but no CARLI. MDM: Patient is a 60-year-old female present with multiple varied concerns for a syncopal episode with fall as well as 1 month of intermittent rectal bleeding with unremarkable imaging, lab work and physical exam with stable vital signs and is afebrile requiring oral and IV pain medication for ankle pain as well as crutches and air splint appropriate for discharge home and outpatient follow-up. Patient responded well to IV pain medication. Instructions for crutches and air splint given by nursing. Work note provided for patient's daughter. Patient instructed to contact her GI doctor to see if they can move up her appointment as she continues to have rectal bleeding. Information for orthopedic surgery given for ankle pain/sprain. Additionally patient should follow-up with PCP. No further emergency medical interventions indicated this patient who is stable for discharge. Key discharge instructions and summary of care: You presented to the ED with multiple concerns. You have had rectal bleeding for approximately 1 month. Your Hemoccult/microscopic stool examination showed positive for blood. However hemoglobin is well within normal limits for a woman your age. No signs of anemia. Please keep your follow-up appointment with GI. Please call them and update them on your recent ED visit to see if they can see you sooner. Additionally you fell and hit your head, a CT scan was obtained because you are on anticoagulation. No bleeds were identified. You may have a concussion from your fall. Please see concussion information your discharge paperwork. You also endorsed syncope. Your chest x-ray was unremarkable. Your EKG did show a Mobitz type I second-degree AV block. These are usually asymptomatic however he did have a syncopal episode so following up with her cardiologist is indicated. You appeared to have a significant left ankle sprain. No fractures on x-ray. Air splint and crutches were provided. Oral pain medication sent to your pharmacy. If pain not improving follow-up with orthopedics as recommended in your discharge paperwork      The patient has been re-evaluated and feeling better. Patient is stable for discharge. All available radiology and laboratory results have been reviewed with patient and/or available family. Patient and/or family verbally conveyed their understanding and agreement of the patient's signs, symptoms, diagnosis, treatment and prognosis and additionally agree to follow-up as recommended in the discharge instructions or to return to the Emergency Department should their condition change or worsen prior to their follow-up appointment. All questions have been answered and patient and/or available family express understanding. IMPRESSION:  1. Acute left ankle pain    2. Sprain of left ankle, unspecified ligament, initial encounter    3. Fall, initial encounter    4. Concussion with loss of consciousness of 30 minutes or less, initial encounter        DISPOSITION: Discharged    Quintin Galindo MD        Procedures

## 2021-09-23 NOTE — ED TRIAGE NOTES
Pt reports today she has been feeling weak, nauseated, and coughing. Pt states she was having a coughing fit today when she was unable to catch her breath and had a syncopal episode. Hit her head and hurt her left foot. States she has been having rectal bleeding secondary to hemorrhoids x 1 month. On xarelto.

## 2021-09-24 ENCOUNTER — DOCUMENTATION ONLY (OUTPATIENT)
Dept: FAMILY MEDICINE CLINIC | Age: 61
End: 2021-09-24

## 2021-09-27 ENCOUNTER — DOCUMENTATION ONLY (OUTPATIENT)
Dept: FAMILY MEDICINE CLINIC | Age: 61
End: 2021-09-27

## 2021-09-27 NOTE — PROGRESS NOTES
Faxed 07/06/21 office note/lab results to Dr Munir Pacheco per [facility] request. Fax #[phone] confirmation received.

## 2021-09-30 ENCOUNTER — DOCUMENTATION ONLY (OUTPATIENT)
Dept: FAMILY MEDICINE CLINIC | Age: 61
End: 2021-09-30

## 2021-10-01 ENCOUNTER — DOCUMENTATION ONLY (OUTPATIENT)
Dept: FAMILY MEDICINE CLINIC | Age: 61
End: 2021-10-01

## 2021-10-05 ENCOUNTER — TELEPHONE (OUTPATIENT)
Dept: FAMILY MEDICINE CLINIC | Age: 61
End: 2021-10-05

## 2021-10-05 DIAGNOSIS — Z79.4 TYPE 2 DIABETES MELLITUS WITH HYPERGLYCEMIA, WITH LONG-TERM CURRENT USE OF INSULIN (HCC): ICD-10-CM

## 2021-10-05 DIAGNOSIS — E11.65 TYPE 2 DIABETES MELLITUS WITH HYPERGLYCEMIA, WITH LONG-TERM CURRENT USE OF INSULIN (HCC): ICD-10-CM

## 2021-10-05 NOTE — TELEPHONE ENCOUNTER
Patient would like to discuss medication for a colonoscopy. I don't quite understand from her why we need to order medication.  Please call patient: [phone]

## 2021-10-06 DIAGNOSIS — L40.3 PUSTULAR PSORIASIS OF PALMS AND SOLES: ICD-10-CM

## 2021-10-06 RX ORDER — TRIAMCINOLONE ACETONIDE 1 MG/G
CREAM TOPICAL
Qty: 45 G | Refills: 1 | Status: SHIPPED | OUTPATIENT
Start: 2021-10-06 | End: 2021-12-21

## 2021-10-06 RX ORDER — (INSULIN DEGLUDEC AND LIRAGLUTIDE) 100; 3.6 [IU]/ML; MG/ML
INJECTION, SOLUTION SUBCUTANEOUS
Qty: 15 ML | Refills: 5 | Status: SHIPPED | OUTPATIENT
Start: 2021-10-06 | End: 2022-05-27

## 2021-10-07 ENCOUNTER — DOCUMENTATION ONLY (OUTPATIENT)
Dept: CARDIOLOGY CLINIC | Age: 61
End: 2021-10-07

## 2021-10-07 NOTE — TELEPHONE ENCOUNTER
2nd attempt. Called to speak with patient and family member answered stating that she is currently getting her colonoscopy. Nothing further needed at this time.

## 2021-10-29 DIAGNOSIS — I25.10 CORONARY ARTERY DISEASE INVOLVING NATIVE CORONARY ARTERY OF NATIVE HEART WITHOUT ANGINA PECTORIS: ICD-10-CM

## 2021-10-29 RX ORDER — NITROGLYCERIN 0.4 MG/1
TABLET SUBLINGUAL
Qty: 75 TABLET | Refills: 1 | Status: SHIPPED | OUTPATIENT
Start: 2021-10-29 | End: 2022-05-03

## 2021-11-28 RX ORDER — ALBUTEROL SULFATE 90 UG/1
AEROSOL, METERED RESPIRATORY (INHALATION)
Qty: 8.5 EACH | Refills: 5 | Status: SHIPPED | OUTPATIENT
Start: 2021-11-28 | End: 2022-05-31 | Stop reason: SDUPTHER

## 2021-12-19 DIAGNOSIS — L40.3 PUSTULAR PSORIASIS OF PALMS AND SOLES: ICD-10-CM

## 2021-12-21 RX ORDER — TRIAMCINOLONE ACETONIDE 1 MG/G
CREAM TOPICAL
Qty: 45 G | Refills: 1 | Status: SHIPPED | OUTPATIENT
Start: 2021-12-21 | End: 2022-02-22

## 2022-02-20 DIAGNOSIS — L40.3 PUSTULAR PSORIASIS OF PALMS AND SOLES: ICD-10-CM

## 2022-02-22 RX ORDER — TRIAMCINOLONE ACETONIDE 1 MG/G
CREAM TOPICAL
Qty: 45 G | Refills: 1 | Status: SHIPPED | OUTPATIENT
Start: 2022-02-22 | End: 2022-04-12

## 2022-03-19 PROBLEM — I25.10 CORONARY ARTERY DISEASE INVOLVING NATIVE CORONARY ARTERY OF NATIVE HEART WITHOUT ANGINA PECTORIS: Status: ACTIVE | Noted: 2018-03-20

## 2022-03-19 PROBLEM — E66.01 SEVERE OBESITY (HCC): Status: ACTIVE | Noted: 2019-10-17

## 2022-04-12 DIAGNOSIS — E11.41 DIABETIC MONONEUROPATHY ASSOCIATED WITH TYPE 2 DIABETES MELLITUS (HCC): ICD-10-CM

## 2022-04-12 DIAGNOSIS — L40.3 PUSTULAR PSORIASIS OF PALMS AND SOLES: ICD-10-CM

## 2022-04-12 RX ORDER — GABAPENTIN 600 MG/1
600 TABLET ORAL 3 TIMES DAILY
Qty: 270 TABLET | OUTPATIENT
Start: 2022-04-12

## 2022-04-12 RX ORDER — TRIAMCINOLONE ACETONIDE 1 MG/G
CREAM TOPICAL
Qty: 45 G | Refills: 1 | Status: SHIPPED | OUTPATIENT
Start: 2022-04-12 | End: 2022-05-27

## 2022-04-12 NOTE — TELEPHONE ENCOUNTER
Called and spoke with patient. Advised of RX sent to pharmacy and that she needs to be seen for gabapentin refill. Patient verbalized understanding.  Apt scheduled for 4/28/22

## 2022-05-02 DIAGNOSIS — I25.10 CORONARY ARTERY DISEASE INVOLVING NATIVE CORONARY ARTERY OF NATIVE HEART WITHOUT ANGINA PECTORIS: ICD-10-CM

## 2022-05-03 RX ORDER — NITROGLYCERIN 0.4 MG/1
TABLET SUBLINGUAL
Qty: 75 TABLET | Refills: 1 | Status: SHIPPED | OUTPATIENT
Start: 2022-05-03

## 2022-05-23 ENCOUNTER — DOCUMENTATION ONLY (OUTPATIENT)
Dept: FAMILY MEDICINE CLINIC | Age: 62
End: 2022-05-23

## 2022-05-26 DIAGNOSIS — Z79.4 TYPE 2 DIABETES MELLITUS WITH HYPERGLYCEMIA, WITH LONG-TERM CURRENT USE OF INSULIN (HCC): ICD-10-CM

## 2022-05-26 DIAGNOSIS — L40.3 PUSTULAR PSORIASIS OF PALMS AND SOLES: ICD-10-CM

## 2022-05-26 DIAGNOSIS — E11.65 TYPE 2 DIABETES MELLITUS WITH HYPERGLYCEMIA, WITH LONG-TERM CURRENT USE OF INSULIN (HCC): ICD-10-CM

## 2022-05-27 RX ORDER — TRIAMCINOLONE ACETONIDE 1 MG/G
CREAM TOPICAL
Qty: 30 G | Refills: 10 | Status: SHIPPED | OUTPATIENT
Start: 2022-05-27 | End: 2022-07-05 | Stop reason: SDUPTHER

## 2022-05-27 RX ORDER — (INSULIN DEGLUDEC AND LIRAGLUTIDE) 100; 3.6 [IU]/ML; MG/ML
INJECTION, SOLUTION SUBCUTANEOUS
Qty: 15 ML | Refills: 10 | Status: SHIPPED | OUTPATIENT
Start: 2022-05-27 | End: 2022-06-09 | Stop reason: SDUPTHER

## 2022-05-31 ENCOUNTER — DOCUMENTATION ONLY (OUTPATIENT)
Dept: FAMILY MEDICINE CLINIC | Age: 62
End: 2022-05-31

## 2022-05-31 DIAGNOSIS — E11.65 TYPE 2 DIABETES MELLITUS WITH HYPERGLYCEMIA, WITH LONG-TERM CURRENT USE OF INSULIN (HCC): ICD-10-CM

## 2022-05-31 DIAGNOSIS — Z79.4 TYPE 2 DIABETES MELLITUS WITH HYPERGLYCEMIA, WITH LONG-TERM CURRENT USE OF INSULIN (HCC): ICD-10-CM

## 2022-05-31 DIAGNOSIS — R11.0 NAUSEA: ICD-10-CM

## 2022-06-01 ENCOUNTER — TELEPHONE (OUTPATIENT)
Dept: FAMILY MEDICINE CLINIC | Age: 62
End: 2022-06-01

## 2022-06-01 DIAGNOSIS — E11.65 TYPE 2 DIABETES MELLITUS WITH HYPERGLYCEMIA, WITH LONG-TERM CURRENT USE OF INSULIN (HCC): Primary | ICD-10-CM

## 2022-06-01 DIAGNOSIS — Z79.4 TYPE 2 DIABETES MELLITUS WITH HYPERGLYCEMIA, WITH LONG-TERM CURRENT USE OF INSULIN (HCC): Primary | ICD-10-CM

## 2022-06-01 RX ORDER — ALBUTEROL SULFATE 90 UG/1
AEROSOL, METERED RESPIRATORY (INHALATION)
Qty: 8.5 EACH | Refills: 5 | Status: SHIPPED | OUTPATIENT
Start: 2022-06-01 | End: 2022-06-09 | Stop reason: SDUPTHER

## 2022-06-01 RX ORDER — ISOPROPYL ALCOHOL 70 ML/100ML
SWAB TOPICAL
Qty: 300 PAD | Refills: 4 | Status: SHIPPED | OUTPATIENT
Start: 2022-06-01

## 2022-06-01 RX ORDER — DILTIAZEM HYDROCHLORIDE 240 MG/1
CAPSULE, COATED, EXTENDED RELEASE ORAL
Qty: 90 CAPSULE | Refills: 3 | Status: SHIPPED | OUTPATIENT
Start: 2022-06-01 | End: 2022-06-09 | Stop reason: SDUPTHER

## 2022-06-01 RX ORDER — LANCETS
EACH MISCELLANEOUS
Qty: 200 EACH | Refills: 3 | Status: SHIPPED | OUTPATIENT
Start: 2022-06-01

## 2022-06-01 RX ORDER — PEN NEEDLE, DIABETIC 31 GX3/16"
NEEDLE, DISPOSABLE MISCELLANEOUS
Qty: 100 PEN NEEDLE | Refills: 11 | Status: SHIPPED | OUTPATIENT
Start: 2022-06-01 | End: 2022-06-03 | Stop reason: SDUPTHER

## 2022-06-01 NOTE — TELEPHONE ENCOUNTER
Called and spoke with the pharmacist. They just needed to confirm that patient is taking Januvia and Lory Shaver. Confirmed that patient if taking both. Nothing further needed at this time.

## 2022-06-03 DIAGNOSIS — Z79.4 TYPE 2 DIABETES MELLITUS WITH HYPERGLYCEMIA, WITH LONG-TERM CURRENT USE OF INSULIN (HCC): ICD-10-CM

## 2022-06-03 DIAGNOSIS — E11.65 TYPE 2 DIABETES MELLITUS WITH HYPERGLYCEMIA, WITH LONG-TERM CURRENT USE OF INSULIN (HCC): ICD-10-CM

## 2022-06-03 RX ORDER — PEN NEEDLE, DIABETIC 31 GX3/16"
NEEDLE, DISPOSABLE MISCELLANEOUS
Qty: 100 PEN NEEDLE | Refills: 11 | Status: SHIPPED | OUTPATIENT
Start: 2022-06-03

## 2022-06-09 ENCOUNTER — OFFICE VISIT (OUTPATIENT)
Dept: FAMILY MEDICINE CLINIC | Age: 62
End: 2022-06-09
Payer: MEDICARE

## 2022-06-09 VITALS
TEMPERATURE: 98.3 F | HEIGHT: 62 IN | RESPIRATION RATE: 16 BRPM | WEIGHT: 192 LBS | HEART RATE: 99 BPM | DIASTOLIC BLOOD PRESSURE: 77 MMHG | SYSTOLIC BLOOD PRESSURE: 158 MMHG | OXYGEN SATURATION: 94 % | BODY MASS INDEX: 35.33 KG/M2

## 2022-06-09 DIAGNOSIS — J42 CHRONIC BRONCHITIS, UNSPECIFIED CHRONIC BRONCHITIS TYPE (HCC): ICD-10-CM

## 2022-06-09 DIAGNOSIS — I10 ESSENTIAL HYPERTENSION: ICD-10-CM

## 2022-06-09 DIAGNOSIS — Z79.4 TYPE 2 DIABETES MELLITUS WITH DIABETIC MONONEUROPATHY, WITH LONG-TERM CURRENT USE OF INSULIN (HCC): ICD-10-CM

## 2022-06-09 DIAGNOSIS — I48.0 PAROXYSMAL ATRIAL FIBRILLATION (HCC): ICD-10-CM

## 2022-06-09 DIAGNOSIS — Z79.4 TYPE 2 DIABETES MELLITUS WITH HYPERGLYCEMIA, WITH LONG-TERM CURRENT USE OF INSULIN (HCC): ICD-10-CM

## 2022-06-09 DIAGNOSIS — K21.9 GASTROESOPHAGEAL REFLUX DISEASE, UNSPECIFIED WHETHER ESOPHAGITIS PRESENT: ICD-10-CM

## 2022-06-09 DIAGNOSIS — Z87.891 PERSONAL HISTORY OF TOBACCO USE, PRESENTING HAZARDS TO HEALTH: ICD-10-CM

## 2022-06-09 DIAGNOSIS — E11.41 TYPE 2 DIABETES MELLITUS WITH DIABETIC MONONEUROPATHY, WITH LONG-TERM CURRENT USE OF INSULIN (HCC): ICD-10-CM

## 2022-06-09 DIAGNOSIS — I10 PRIMARY HYPERTENSION: ICD-10-CM

## 2022-06-09 DIAGNOSIS — E11.65 TYPE 2 DIABETES MELLITUS WITH HYPERGLYCEMIA, WITH LONG-TERM CURRENT USE OF INSULIN (HCC): ICD-10-CM

## 2022-06-09 DIAGNOSIS — Z00.00 PHYSICAL EXAM: Primary | ICD-10-CM

## 2022-06-09 DIAGNOSIS — I25.10 CORONARY ARTERY DISEASE INVOLVING NATIVE CORONARY ARTERY OF NATIVE HEART WITHOUT ANGINA PECTORIS: ICD-10-CM

## 2022-06-09 DIAGNOSIS — I73.9 PVD (PERIPHERAL VASCULAR DISEASE) (HCC): ICD-10-CM

## 2022-06-09 PROCEDURE — G0296 VISIT TO DETERM LDCT ELIG: HCPCS | Performed by: FAMILY MEDICINE

## 2022-06-09 PROCEDURE — 99396 PREV VISIT EST AGE 40-64: CPT | Performed by: FAMILY MEDICINE

## 2022-06-09 RX ORDER — (INSULIN DEGLUDEC AND LIRAGLUTIDE) 100; 3.6 [IU]/ML; MG/ML
INJECTION, SOLUTION SUBCUTANEOUS
Qty: 45 ML | Refills: 3 | Status: SHIPPED | OUTPATIENT
Start: 2022-06-09

## 2022-06-09 RX ORDER — PANTOPRAZOLE SODIUM 40 MG/1
40 TABLET, DELAYED RELEASE ORAL DAILY
COMMUNITY
End: 2022-06-09

## 2022-06-09 RX ORDER — LISINOPRIL 40 MG/1
40 TABLET ORAL DAILY
Qty: 90 TABLET | Refills: 0 | Status: SHIPPED | OUTPATIENT
Start: 2022-06-09 | End: 2022-06-17 | Stop reason: SDUPTHER

## 2022-06-09 RX ORDER — FLUTICASONE PROPIONATE AND SALMETEROL 250; 50 UG/1; UG/1
POWDER RESPIRATORY (INHALATION)
Qty: 3 EACH | Refills: 3 | Status: SHIPPED | OUTPATIENT
Start: 2022-06-09

## 2022-06-09 RX ORDER — PANTOPRAZOLE SODIUM 40 MG/1
40 TABLET, DELAYED RELEASE ORAL DAILY
Qty: 90 TABLET | Refills: 4 | Status: SHIPPED | OUTPATIENT
Start: 2022-06-09

## 2022-06-09 RX ORDER — DILTIAZEM HYDROCHLORIDE 240 MG/1
CAPSULE, COATED, EXTENDED RELEASE ORAL
Qty: 90 CAPSULE | Refills: 3 | Status: SHIPPED | OUTPATIENT
Start: 2022-06-09 | End: 2022-07-05 | Stop reason: SDUPTHER

## 2022-06-09 RX ORDER — ALBUTEROL SULFATE 90 UG/1
AEROSOL, METERED RESPIRATORY (INHALATION)
Qty: 8.5 EACH | Refills: 5 | Status: SHIPPED | OUTPATIENT
Start: 2022-06-09 | End: 2022-06-14

## 2022-06-09 RX ORDER — NAPROXEN SODIUM 220 MG
220 TABLET ORAL 2 TIMES DAILY WITH MEALS
COMMUNITY

## 2022-06-09 RX ORDER — ROSUVASTATIN CALCIUM 20 MG/1
20 TABLET, COATED ORAL
Qty: 90 TABLET | Refills: 4 | Status: SHIPPED | OUTPATIENT
Start: 2022-06-09

## 2022-06-09 RX ORDER — LISINOPRIL 30 MG/1
30 TABLET ORAL DAILY
Qty: 90 TABLET | Refills: 4 | Status: SHIPPED | OUTPATIENT
Start: 2022-06-09 | End: 2022-06-09

## 2022-06-09 NOTE — PATIENT INSTRUCTIONS
Medicare Wellness Visit, Female     The best way to live healthy is to have a lifestyle where you eat a well-balanced diet, exercise regularly, limit alcohol use, and quit all forms of tobacco/nicotine, if applicable. Regular preventive services are another way to keep healthy. Preventive services (vaccines, screening tests, monitoring & exams) can help personalize your care plan, which helps you manage your own care. Screening tests can find health problems at the earliest stages, when they are easiest to treat. [facility] follows the current, evidence-based guidelines published by the [facility] Chamorro ([facility]) when recommending preventive services for our patients. Because we follow these guidelines, sometimes recommendations change over time as research supports it. (For example, mammograms used to be recommended annually. Even though Medicare will still pay for an annual mammogram, the newer guidelines recommend a mammogram every two years for women of average risk). Of course, you and your doctor may decide to screen more often for some diseases, based on your risk and your co-morbidities (chronic disease you are already diagnosed with). Preventive services for you include:  - Medicare offers their members a free annual wellness visit, which is time for you and your primary care provider to discuss and plan for your preventive service needs. Take advantage of this benefit every year!  -All adults over the age of 72 should receive the recommended pneumonia vaccines. Current USPSTF guidelines recommend a series of two vaccines for the best pneumonia protection.   -All adults should have a flu vaccine yearly and a tetanus vaccine every 10 years.   -All adults age 48 and older should receive the shingles vaccines (series of two vaccines).       -All adults age 38-68 who are overweight should have a diabetes screening test once every three years.   -All adults born between [birth year] and 1965 should be screened once for Hepatitis C.  -Other screening tests and preventive services for persons with diabetes include: an eye exam to screen for diabetic retinopathy, a kidney function test, a foot exam, and stricter control over your cholesterol.   -Cardiovascular screening for adults with routine risk involves an electrocardiogram (ECG) at intervals determined by your doctor.   -Colorectal cancer screenings should be done for adults age 54-65 with no increased risk factors for colorectal cancer. There are a number of acceptable methods of screening for this type of cancer. Each test has its own benefits and drawbacks. Discuss with your doctor what is most appropriate for you during your annual wellness visit. The different tests include: colonoscopy (considered the best screening method), a fecal occult blood test, a fecal DNA test, and sigmoidoscopy.    -A bone mass density test is recommended when a woman turns 65 to screen for osteoporosis. This test is only recommended one time, as a screening. Some providers will use this same test as a disease monitoring tool if you already have osteoporosis. -Breast cancer screenings are recommended every other year for women of normal risk, age 54-69.  -Cervical cancer screenings for women over age 72 are only recommended with certain risk factors.      Here is a list of your current Health Maintenance items (your personalized list of preventive services) with a due date:  Health Maintenance Due   Topic Date Due    Cervical cancer screen  Never done    Shingles Vaccine (1 of 2) Never done    Smoker or Former Smoker - Annual Lung Cancer Screen  Never done    Pneumococcal Vaccine (2 - PCV) 11/14/2018    COVID-19 Vaccine (3 - Booster for Pfizer series) 09/05/2021    Cholesterol Test   01/06/2022    Mammogram  02/25/2022

## 2022-06-09 NOTE — PROGRESS NOTES
Chief Complaint   Patient presents with   [address]     she is a 64y.o. year old female who presents for evalution. Patient here for physical exam with diabetes and lab follow-up. Diabetes has been well controlled has not been checked in almost a year. Taking her medications without any issues. Cholesterols been at goal with an LDL of 60. She is taking Xultophy at 50 units daily therefore we will discontinue the Januvia. Pt has been smoking since 15 and smokes 1ppd and still smokes wants LDCT screen, discissed wioth pt  Reviewed PmHx, RxHx, FmHx, SocHx, AllgHx and updated and dated in the chart. Review of Systems - negative except as listed above in the HPI    Objective:     Vitals:    06/09/22 1322 06/09/22 1359   BP: (!) 153/86 (!) 158/77   Pulse: 99    Resp: 16    Temp: 98.3 °F (36.8 °C)    TempSrc: Oral    SpO2: 94%    Weight: 192 lb (87.1 kg)    Height: 5' 2\" (1.575 m)      Physical Examination: General appearance - alert, well appearing, and in no distress  Mental status - alert, oriented to person, place, and time  Eyes - pupils equal and reactive, extraocular eye movements intact  Chest - clear to auscultation, no wheezes, rales or rhonchi, symmetric air entry  Heart - normal rate, regular rhythm, normal S1, S2, no murmurs, rubs, clicks or gallops  Neurological - alert, oriented, normal speech, no focal findings or movement disorder noted  Musculoskeletal - no joint tenderness, deformity or swelling  Extremities - peripheral pulses normal, no pedal edema, no clubbing or cyanosis    Assessment/ Plan:   Diagnoses and all orders for this visit:    1. Physical exam  -     LIPID PANEL; Future  -     METABOLIC PANEL, COMPREHENSIVE; Future  -     TSH 3RD GENERATION; Future  -     CBC WITH AUTOMATED DIFF; Future  -     HEMOGLOBIN A1C WITH EAG; Future  -     MICROALBUMIN, UR, RAND W/ MICROALB/CREAT RATIO; Future    2. PVD (peripheral vascular disease) ([facility] [address]  -     LIPID PANEL;  Future  - METABOLIC PANEL, COMPREHENSIVE; Future    3. Primary hypertension  -     METABOLIC PANEL, COMPREHENSIVE; Future    4. Type 2 diabetes mellitus with diabetic mononeuropathy, with long-term current use of insulin ([facility])  -     LIPID PANEL; Future  -     METABOLIC PANEL, COMPREHENSIVE; Future  -     HEMOGLOBIN A1C WITH EAG; Future  -     MICROALBUMIN, UR, RAND W/ MICROALB/CREAT RATIO; Future    5. Coronary artery disease involving native coronary artery of native heart without angina pectoris  -     LIPID PANEL; Future  -     METABOLIC PANEL, COMPREHENSIVE; Future  -     rosuvastatin (CRESTOR) 20 mg tablet; Take 1 Tablet by mouth nightly. 6. Essential hypertension  -     lisinopriL (PRINIVIL, ZESTRIL) 40 mg tablet; Take 1 Tablet by mouth daily. 7. Gastroesophageal reflux disease, unspecified whether esophagitis present  -     pantoprazole (PROTONIX) 40 mg tablet; Take 1 Tablet by mouth daily. 8. Type 2 diabetes mellitus with hyperglycemia, with long-term current use of insulin ([facility])  -     insulin degludec-liraglutide (Xultophy 100/3.6) 100 unit-3.6 mg /mL (3 mL) inpn; 50 UNITS DAILY subq. 9. Chronic bronchitis, unspecified chronic bronchitis type ([facility])  -     albuterol (PROVENTIL HFA, VENTOLIN HFA, PROAIR HFA) 90 mcg/actuation inhaler; INHALE 2 PUFFS EVERY 4 HOURS AS NEEDED SHORTNESS OF BREATH/WHEEZE  -     fluticasone propion-salmeteroL (Advair Diskus) 250-50 mcg/dose diskus inhaler; INHALE 1 PUFF BY MOUTH TWICE DAILY wixela if preferred    10. Paroxysmal atrial fibrillation ([facility])  -     dilTIAZem ER (CARDIZEM CD) 240 mg capsule; TAKE 1 CAPSULE BY MOUTH EVERY DAY  -     rivaroxaban (Xarelto) 20 mg tab tablet; TAKE 1 TABLET BY MOUTH EVERY DAY WITH DINNER    11. Personal history of tobacco use, presenting hazards to health  -     CT LOW DOSE LUNG CANCER SCREENING;  Future    schedule mammo order stil lactive   -Patient is in good health  -Discussed with patient cancer risk factors and screens needed  -Patient needs a colonoscopy no  -Labs from previous visits were discussed with patient yes  -Discussed with patient diet and exercise=yes  -Discussed with patient testicular (male)/breast self exam (female)= yes  Follow-up and Dispositions    · Return in 4 weeks (on 7/7/2022), or if symptoms worsen or fail to improve, for bp check. I have discussed the diagnosis with the patient and the intended plan as seen in the above orders. The patient has received an after-visit summary and questions were answered concerning future plans. Pt conveyed understanding. Medication Side Effects and Warnings were discussed with patient: yes  Patient Labs were reviewed and or requested: yes  Patient Past Records were reviewed and or requested  yes    Patient Instructions       Medicare Wellness Visit, Female     The best way to live healthy is to have a lifestyle where you eat a well-balanced diet, exercise regularly, limit alcohol use, and quit all forms of tobacco/nicotine, if applicable. Regular preventive services are another way to keep healthy. Preventive services (vaccines, screening tests, monitoring & exams) can help personalize your care plan, which helps you manage your own care. Screening tests can find health problems at the earliest stages, when they are easiest to treat. [facility] follows the current, evidence-based guidelines published by the [place] [employer] (USPSTF) when recommending preventive services for our patients. Because we follow these guidelines, sometimes recommendations change over time as research supports it. (For example, mammograms used to be recommended annually. Even though Medicare will still pay for an annual mammogram, the newer guidelines recommend a mammogram every two years for women of average risk).   Of course, you and your doctor may decide to screen more often for some diseases, based on your risk and your co-morbidities (chronic disease you are already diagnosed with). Preventive services for you include:  - Medicare offers their members a free annual wellness visit, which is time for you and your primary care provider to discuss and plan for your preventive service needs. Take advantage of this benefit every year!  -All adults over the age of 72 should receive the recommended pneumonia vaccines. Current USPSTF guidelines recommend a series of two vaccines for the best pneumonia protection.   -All adults should have a flu vaccine yearly and a tetanus vaccine every 10 years.   -All adults age 48 and older should receive the shingles vaccines (series of two vaccines). -All adults age 38-68 who are overweight should have a diabetes screening test once every three years.   -All adults born between [birth year] and 1965 should be screened once for Hepatitis C.  -Other screening tests and preventive services for persons with diabetes include: an eye exam to screen for diabetic retinopathy, a kidney function test, a foot exam, and stricter control over your cholesterol.   -Cardiovascular screening for adults with routine risk involves an electrocardiogram (ECG) at intervals determined by your doctor.   -Colorectal cancer screenings should be done for adults age 54-65 with no increased risk factors for colorectal cancer. There are a number of acceptable methods of screening for this type of cancer. Each test has its own benefits and drawbacks. Discuss with your doctor what is most appropriate for you during your annual wellness visit. The different tests include: colonoscopy (considered the best screening method), a fecal occult blood test, a fecal DNA test, and sigmoidoscopy.    -A bone mass density test is recommended when a woman turns 65 to screen for osteoporosis. This test is only recommended one time, as a screening. Some providers will use this same test as a disease monitoring tool if you already have osteoporosis.   -Breast cancer screenings are recommended every other year for women of normal risk, age 54-69.  -Cervical cancer screenings for women over age 72 are only recommended with certain risk factors. Here is a list of your current Health Maintenance items (your personalized list of preventive services) with a due date:  Health Maintenance Due   Topic Date Due    Cervical cancer screen  Never done    Shingles Vaccine (1 of 2) Never done    Smoker or Former Smoker - [address]  Never done    Pneumococcal Vaccine (2 - PCV) 11/14/2018    COVID-19 Vaccine (3 - Booster for Hernandez Peter series) 09/05/2021    Cholesterol Test   01/06/2022    Mammogram  02/25/2022                 Dr. Corine Friedman  Discussed with the patient the current USPSTF guidelines released March 9, 2021 for screening for lung cancer. For adults aged 48 to [de-identified] years who have a 20 pack-year smoking history and currently smoke or have quit within the past 15 years the grade B recommendation is to:  Screen for lung cancer with low-dose computed tomography (LDCT) every year. Stop screening once a person has not smoked for 15 years or has a health problem that limits life expectancy or the ability to have lung surgery. The patient has a 48 pack-year history of cigarette smoking and currently is smoking 1ppd. Discussed with patient the risks and benefits of screening, including over-diagnosis, false positive rate, and total radiation exposure. The patient currently exhibits no signs or symptoms suggestive of lung cancer. Discussed with patient the importance of compliance with yearly annual lung cancer screenings and willingness to undergo diagnosis and treatment if screening scan is positive. In addition, the patient was counseled regarding the importance of remaining smoke free and/or total smoking cessation.     Also reviewed the following if the patient has Medicare that as of February 10, 2022, Medicare only covers LDCT screening in patients aged 50-77 with at least a 20 pack-year smoking history who currently smoke or have quit in the last 15 years

## 2022-06-09 NOTE — PROGRESS NOTES
Chief Complaint   Patient presents with   [address]     Patient presents in office today for f/u and labs. No concerns. 1. Have you been to the ER, urgent care clinic since your last visit? Hospitalized since your last visit? No    2. Have you seen or consulted any other health care providers outside of the [address] [place] [address] since your last visit? Include any pap smears or colon screening.  No      Learning Assessment 3/29/2018   PRIMARY LEARNER Patient   HIGHEST LEVEL OF EDUCATION - PRIMARY LEARNER  GRADUATED HIGH SCHOOL OR GED   BARRIERS PRIMARY LEARNER NONE   CO-LEARNER CAREGIVER No   PRIMARY LANGUAGE ENGLISH   LEARNER PREFERENCE PRIMARY DEMONSTRATION   ANSWERED BY Patient   RELATIONSHIP SELF

## 2022-06-10 LAB
ALBUMIN SERPL-MCNC: 3.5 G/DL (ref 3.5–5)
ALBUMIN/GLOB SERPL: 1 {RATIO} (ref 1.1–2.2)
ALP SERPL-CCNC: 117 U/L (ref 45–117)
ALT SERPL-CCNC: 14 U/L (ref 12–78)
ANION GAP SERPL CALC-SCNC: 5 MMOL/L (ref 5–15)
AST SERPL-CCNC: 18 U/L (ref 15–37)
BASOPHILS # BLD: 0 K/UL (ref 0–0.1)
BASOPHILS NFR BLD: 0 % (ref 0–1)
BILIRUB SERPL-MCNC: 0.3 MG/DL (ref 0.2–1)
BUN SERPL-MCNC: 7 MG/DL (ref 6–20)
BUN/CREAT SERPL: 10 (ref 12–20)
CALCIUM SERPL-MCNC: 9 MG/DL (ref 8.5–10.1)
CHLORIDE SERPL-SCNC: 110 MMOL/L (ref 97–108)
CHOLEST SERPL-MCNC: 115 MG/DL
CO2 SERPL-SCNC: 26 MMOL/L (ref 21–32)
CREAT SERPL-MCNC: 0.72 MG/DL (ref 0.55–1.02)
CREAT UR-MCNC: 31.3 MG/DL
DIFFERENTIAL METHOD BLD: ABNORMAL
EOSINOPHIL # BLD: 0.2 K/UL (ref 0–0.4)
EOSINOPHIL NFR BLD: 2 % (ref 0–7)
ERYTHROCYTE [DISTWIDTH] IN BLOOD BY AUTOMATED COUNT: 20.8 % (ref 11.5–14.5)
EST. AVERAGE GLUCOSE BLD GHB EST-MCNC: 128 MG/DL
GLOBULIN SER CALC-MCNC: 3.5 G/DL (ref 2–4)
GLUCOSE SERPL-MCNC: 95 MG/DL (ref 65–100)
HBA1C MFR BLD: 6.1 % (ref 4–5.6)
HCT VFR BLD AUTO: 44.3 % (ref 35–47)
HDLC SERPL-MCNC: 48 MG/DL
HDLC SERPL: 2.4 {RATIO} (ref 0–5)
HGB BLD-MCNC: 13.2 G/DL (ref 11.5–16)
IMM GRANULOCYTES # BLD AUTO: 0 K/UL (ref 0–0.04)
IMM GRANULOCYTES NFR BLD AUTO: 0 % (ref 0–0.5)
LDLC SERPL CALC-MCNC: 45.2 MG/DL (ref 0–100)
LYMPHOCYTES # BLD: 2.4 K/UL (ref 0.8–3.5)
LYMPHOCYTES NFR BLD: 23 % (ref 12–49)
MCH RBC QN AUTO: 26.4 PG (ref 26–34)
MCHC RBC AUTO-ENTMCNC: 29.8 G/DL (ref 30–36.5)
MCV RBC AUTO: 88.6 FL (ref 80–99)
MICROALBUMIN UR-MCNC: 8.16 MG/DL
MICROALBUMIN/CREAT UR-RTO: 261 MG/G (ref 0–30)
MONOCYTES # BLD: 0.5 K/UL (ref 0–1)
MONOCYTES NFR BLD: 5 % (ref 5–13)
NEUTS SEG # BLD: 7.5 K/UL (ref 1.8–8)
NEUTS SEG NFR BLD: 70 % (ref 32–75)
NRBC # BLD: 0 K/UL (ref 0–0.01)
NRBC BLD-RTO: 0 PER 100 WBC
PLATELET # BLD AUTO: 259 K/UL (ref 150–400)
PMV BLD AUTO: 11.4 FL (ref 8.9–12.9)
POTASSIUM SERPL-SCNC: 4.1 MMOL/L (ref 3.5–5.1)
PROT SERPL-MCNC: 7 G/DL (ref 6.4–8.2)
RBC # BLD AUTO: 5 M/UL (ref 3.8–5.2)
RBC MORPH BLD: ABNORMAL
SODIUM SERPL-SCNC: 141 MMOL/L (ref 136–145)
TRIGL SERPL-MCNC: 109 MG/DL (ref ?–150)
TSH SERPL DL<=0.05 MIU/L-ACNC: 2.45 UIU/ML (ref 0.36–3.74)
VLDLC SERPL CALC-MCNC: 21.8 MG/DL
WBC # BLD AUTO: 10.6 K/UL (ref 3.6–11)

## 2022-06-13 DIAGNOSIS — J42 CHRONIC BRONCHITIS, UNSPECIFIED CHRONIC BRONCHITIS TYPE (HCC): ICD-10-CM

## 2022-06-14 RX ORDER — ALBUTEROL SULFATE 90 UG/1
AEROSOL, METERED RESPIRATORY (INHALATION)
Qty: 8.5 EACH | Refills: 5 | Status: SHIPPED | OUTPATIENT
Start: 2022-06-14

## 2022-06-14 NOTE — PROGRESS NOTES
Diabetes remains well controlled.   Keep up the good work all of her labs are stable we can recheck the diabetes in 6 months

## 2022-06-17 ENCOUNTER — DOCUMENTATION ONLY (OUTPATIENT)
Dept: FAMILY MEDICINE CLINIC | Age: 62
End: 2022-06-17

## 2022-06-17 DIAGNOSIS — I10 ESSENTIAL HYPERTENSION: ICD-10-CM

## 2022-06-17 DIAGNOSIS — E11.41 DIABETIC MONONEUROPATHY ASSOCIATED WITH TYPE 2 DIABETES MELLITUS (HCC): ICD-10-CM

## 2022-06-17 RX ORDER — GABAPENTIN 600 MG/1
600 TABLET ORAL 3 TIMES DAILY
Qty: 270 TABLET | Refills: 1 | Status: SHIPPED | OUTPATIENT
Start: 2022-06-17

## 2022-06-17 RX ORDER — LISINOPRIL 40 MG/1
40 TABLET ORAL DAILY
Qty: 90 TABLET | Refills: 0 | Status: SHIPPED | OUTPATIENT
Start: 2022-06-17 | End: 2022-09-20

## 2022-06-24 ENCOUNTER — DOCUMENTATION ONLY (OUTPATIENT)
Dept: FAMILY MEDICINE CLINIC | Age: 62
End: 2022-06-24

## 2022-06-24 DIAGNOSIS — I25.10 CORONARY ARTERY DISEASE INVOLVING NATIVE CORONARY ARTERY OF NATIVE HEART WITHOUT ANGINA PECTORIS: ICD-10-CM

## 2022-06-24 NOTE — PROGRESS NOTES
Faxed RX  form to [address] [place] [address] @ [phone]. Confirmation number [id number]. Original form placed in scan folder for central scanning.

## 2022-06-24 NOTE — TELEPHONE ENCOUNTER
Spoke with pt- Stephanie Vu was d/c and lisinopril was increased to 40mg-need that rx sent to [facility] pharmacy.

## 2022-06-24 NOTE — TELEPHONE ENCOUNTER
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 Received fax requesting her preferred pharmacy become [facility]. Does she need any med refills sent to them at this time? If so, which meds? Is she currently taking 100 mg [place]? That was on list but I don't see in EMR.    She's taking 40 mg lisinopril now right, not the 30 mg?

## 2022-07-05 ENCOUNTER — TELEPHONE (OUTPATIENT)
Dept: FAMILY MEDICINE CLINIC | Age: 62
End: 2022-07-05

## 2022-07-05 DIAGNOSIS — I48.0 PAROXYSMAL ATRIAL FIBRILLATION (HCC): ICD-10-CM

## 2022-07-05 DIAGNOSIS — L40.3 PUSTULAR PSORIASIS OF PALMS AND SOLES: ICD-10-CM

## 2022-07-05 RX ORDER — DILTIAZEM HYDROCHLORIDE 240 MG/1
CAPSULE, COATED, EXTENDED RELEASE ORAL
Qty: 90 CAPSULE | Refills: 3 | Status: SHIPPED | OUTPATIENT
Start: 2022-07-05

## 2022-07-05 RX ORDER — TRIAMCINOLONE ACETONIDE 1 MG/G
CREAM TOPICAL
Qty: 30 G | Refills: 2 | Status: SHIPPED | OUTPATIENT
Start: 2022-07-05

## 2022-07-05 NOTE — TELEPHONE ENCOUNTER
Cornel from [facility] called and would like to speak to someone about two prescriptions that they did not receive refills for. Diltiazem er and triamcinolone acetonide.  Please call him back at [phone]

## 2022-07-06 ENCOUNTER — HOSPITAL ENCOUNTER (OUTPATIENT)
Dept: MAMMOGRAPHY | Age: 62
Discharge: HOME | End: 2022-07-06
Attending: FAMILY MEDICINE
Payer: MEDICARE

## 2022-07-06 ENCOUNTER — HOSPITAL ENCOUNTER (OUTPATIENT)
Dept: CT IMAGING | Age: 62
Discharge: HOME | End: 2022-07-06
Attending: FAMILY MEDICINE
Payer: MEDICARE

## 2022-07-06 DIAGNOSIS — Z87.891 PERSONAL HISTORY OF TOBACCO USE, PRESENTING HAZARDS TO HEALTH: ICD-10-CM

## 2022-07-06 DIAGNOSIS — Z12.31 SCREENING MAMMOGRAM FOR HIGH-RISK PATIENT: ICD-10-CM

## 2022-07-06 PROCEDURE — 77063 BREAST TOMOSYNTHESIS BI: CPT

## 2022-07-06 PROCEDURE — 71271 CT THORAX LUNG CANCER SCR C-: CPT

## 2022-07-08 ENCOUNTER — TELEPHONE (OUTPATIENT)
Dept: FAMILY MEDICINE CLINIC | Age: 62
End: 2022-07-08

## 2022-07-08 NOTE — TELEPHONE ENCOUNTER
Called and spoke with patient. She states that she did not request refill and is not longer taking since starting the xultophy.

## 2022-07-19 ENCOUNTER — TELEPHONE (OUTPATIENT)
Dept: FAMILY MEDICINE CLINIC | Age: 62
End: 2022-07-19

## 2022-07-19 NOTE — TELEPHONE ENCOUNTER
Called and spoke with patient. Advised that it was a tiny pulmonary nodule and that test will be repeated in 1 year. Patient verbalized understanding.

## 2022-07-19 NOTE — TELEPHONE ENCOUNTER
Pt needs test results for her CT scan she was told it is not lung cancer but they saw something please advise  [phone]

## 2022-07-19 NOTE — TELEPHONE ENCOUNTER
Patient got her CT scan done on 7/6/22. The hospital sent her a letter stating that she does not have lung cancer, but there there was a non-cancerous finding that would require additional testing and to contact her provider to facilitate the next steps.

## 2022-09-19 DIAGNOSIS — I10 ESSENTIAL HYPERTENSION: ICD-10-CM

## 2022-09-20 RX ORDER — LISINOPRIL 40 MG/1
TABLET ORAL
Qty: 30 TABLET | Refills: 10 | Status: SHIPPED | OUTPATIENT
Start: 2022-09-20 | End: 2022-09-27

## 2022-09-25 DIAGNOSIS — I10 ESSENTIAL HYPERTENSION: ICD-10-CM

## 2022-09-27 RX ORDER — LISINOPRIL 40 MG/1
TABLET ORAL
Qty: 30 TABLET | Refills: 10 | Status: SHIPPED | OUTPATIENT
Start: 2022-09-27

## 2022-11-01 ENCOUNTER — HOSPITAL ENCOUNTER (EMERGENCY)
Age: 62
Discharge: HOME | End: 2022-11-01
Attending: EMERGENCY MEDICINE | Admitting: EMERGENCY MEDICINE
Payer: MEDICARE

## 2022-11-01 ENCOUNTER — APPOINTMENT (OUTPATIENT)
Dept: GENERAL RADIOLOGY | Age: 62
End: 2022-11-01
Attending: EMERGENCY MEDICINE
Payer: MEDICARE

## 2022-11-01 VITALS
TEMPERATURE: 97.9 F | WEIGHT: 180 LBS | OXYGEN SATURATION: 93 % | SYSTOLIC BLOOD PRESSURE: 123 MMHG | RESPIRATION RATE: 29 BRPM | DIASTOLIC BLOOD PRESSURE: 72 MMHG | BODY MASS INDEX: 33.13 KG/M2 | HEART RATE: 151 BPM | HEIGHT: 62 IN

## 2022-11-01 DIAGNOSIS — R07.89 LEFT-SIDED CHEST WALL PAIN: Primary | ICD-10-CM

## 2022-11-01 DIAGNOSIS — R05.3 CHRONIC COUGH: ICD-10-CM

## 2022-11-01 LAB
ALBUMIN SERPL-MCNC: 4 G/DL (ref 3.5–5.2)
ALBUMIN/GLOB SERPL: 1 {RATIO} (ref 1.1–2.2)
ALP SERPL-CCNC: 113 U/L (ref 35–104)
ALT SERPL-CCNC: <5 U/L (ref 10–35)
ANION GAP SERPL CALC-SCNC: 14 MMOL/L (ref 5–15)
AST SERPL-CCNC: 12 U/L (ref 10–35)
ATRIAL RATE: 105 BPM
BASOPHILS # BLD: 0.1 K/UL (ref 0–0.1)
BASOPHILS NFR BLD: 0 % (ref 0–1)
BILIRUB SERPL-MCNC: 0.2 MG/DL (ref 0.2–1)
BUN SERPL-MCNC: 9 MG/DL (ref 8–23)
BUN/CREAT SERPL: 16 (ref 12–20)
CALCIUM SERPL-MCNC: 9 MG/DL (ref 8.8–10.2)
CALCULATED P AXIS, ECG09: 56 DEGREES
CALCULATED R AXIS, ECG10: 41 DEGREES
CALCULATED T AXIS, ECG11: 74 DEGREES
CHLORIDE SERPL-SCNC: 102 MMOL/L (ref 98–107)
CO2 SERPL-SCNC: 21 MMOL/L (ref 22–29)
CREAT SERPL-MCNC: 0.55 MG/DL (ref 0.5–0.9)
DIAGNOSIS, 93000: NORMAL
DIFFERENTIAL METHOD BLD: ABNORMAL
EOSINOPHIL # BLD: 0.2 K/UL (ref 0–0.4)
EOSINOPHIL NFR BLD: 1 % (ref 0–7)
ERYTHROCYTE [DISTWIDTH] IN BLOOD BY AUTOMATED COUNT: 17 % (ref 11.5–14.5)
GLOBULIN SER CALC-MCNC: 3.9 G/DL (ref 2–4)
GLUCOSE SERPL-MCNC: 153 MG/DL (ref 65–100)
HCT VFR BLD AUTO: 45.2 % (ref 35–47)
HGB BLD-MCNC: 14.1 G/DL (ref 11.5–16)
IMM GRANULOCYTES # BLD AUTO: 0 K/UL (ref 0–0.04)
IMM GRANULOCYTES NFR BLD AUTO: 0 % (ref 0–0.5)
LYMPHOCYTES # BLD: 3.3 K/UL (ref 0.8–3.5)
LYMPHOCYTES NFR BLD: 24 % (ref 12–49)
MCH RBC QN AUTO: 27.2 PG (ref 26–34)
MCHC RBC AUTO-ENTMCNC: 31.2 G/DL (ref 30–36.5)
MCV RBC AUTO: 87.3 FL (ref 80–99)
MONOCYTES # BLD: 0.7 K/UL (ref 0–1)
MONOCYTES NFR BLD: 5 % (ref 5–13)
NEUTS SEG # BLD: 9.6 K/UL (ref 1.8–8)
NEUTS SEG NFR BLD: 69 % (ref 32–75)
NRBC # BLD: 0 K/UL (ref 0–0.01)
NRBC BLD-RTO: 0 PER 100 WBC
P-R INTERVAL, ECG05: 166 MS
PLATELET # BLD AUTO: 358 K/UL (ref 150–400)
PMV BLD AUTO: 10.9 FL (ref 8.9–12.9)
POTASSIUM SERPL-SCNC: 3.7 MMOL/L (ref 3.5–5.1)
PROT SERPL-MCNC: 7.9 G/DL (ref 6.4–8.3)
Q-T INTERVAL, ECG07: 360 MS
QRS DURATION, ECG06: 92 MS
QTC CALCULATION (BEZET), ECG08: 475 MS
RBC # BLD AUTO: 5.18 M/UL (ref 3.8–5.2)
SODIUM SERPL-SCNC: 137 MMOL/L (ref 136–145)
TROPONIN I BLD-MCNC: <0.04 NG/ML (ref 0–0.08)
VENTRICULAR RATE, ECG03: 105 BPM
WBC # BLD AUTO: 13.8 K/UL (ref 3.6–11)

## 2022-11-01 PROCEDURE — 85025 COMPLETE CBC W/AUTO DIFF WBC: CPT

## 2022-11-01 PROCEDURE — 93005 ELECTROCARDIOGRAM TRACING: CPT

## 2022-11-01 PROCEDURE — 94640 AIRWAY INHALATION TREATMENT: CPT | Performed by: EMERGENCY MEDICINE

## 2022-11-01 PROCEDURE — 74011000250 HC RX REV CODE- 250: Performed by: EMERGENCY MEDICINE

## 2022-11-01 PROCEDURE — 99285 EMERGENCY DEPT VISIT HI MDM: CPT | Performed by: EMERGENCY MEDICINE

## 2022-11-01 PROCEDURE — 80053 COMPREHEN METABOLIC PANEL: CPT

## 2022-11-01 PROCEDURE — 71045 X-RAY EXAM CHEST 1 VIEW: CPT

## 2022-11-01 PROCEDURE — 36415 COLL VENOUS BLD VENIPUNCTURE: CPT

## 2022-11-01 RX ORDER — IPRATROPIUM BROMIDE AND ALBUTEROL SULFATE 2.5; .5 MG/3ML; MG/3ML
3 SOLUTION RESPIRATORY (INHALATION)
Status: COMPLETED | OUTPATIENT
Start: 2022-11-01 | End: 2022-11-01

## 2022-11-01 RX ORDER — LIDOCAINE 4 G/100G
PATCH TOPICAL
Qty: 30 EACH | Refills: 0 | Status: SHIPPED | OUTPATIENT
Start: 2022-11-01

## 2022-11-01 RX ORDER — NAPROXEN 500 MG/1
500 TABLET ORAL 2 TIMES DAILY WITH MEALS
Qty: 10 TABLET | Refills: 0 | Status: SHIPPED | OUTPATIENT
Start: 2022-11-01 | End: 2022-11-06

## 2022-11-01 RX ORDER — ACETAMINOPHEN 500 MG
1000 TABLET ORAL
Qty: 20 TABLET | Refills: 0 | Status: SHIPPED | OUTPATIENT
Start: 2022-11-01

## 2022-11-01 RX ADMIN — IPRATROPIUM BROMIDE AND ALBUTEROL SULFATE 3 ML: .5; 3 SOLUTION RESPIRATORY (INHALATION) at 01:08

## 2022-11-01 NOTE — ED TRIAGE NOTES
The pt reports that she started having (L) chest pain 2 days prior. The pt report that she has taken Nitro with some relief.  The pain is currently a pressure with no radiating under (L) breast

## 2022-11-02 NOTE — ED PROVIDER NOTES
The history is provided by the patient. Chest Pain   This is a new problem. The current episode started 2 days ago. The problem has not changed since onset. Duration of episode(s) is 2 days. The problem occurs constantly. The pain is associated with normal activity. The pain is present in the lateral region and left side. The pain is mild. The quality of the pain is described as sharp. The pain does not radiate. The symptoms are aggravated by movement and certain positions. Associated symptoms include cough (chronic smoker cough) and shortness of breath. Pertinent negatives include no vomiting. She has tried nothing for the symptoms. Risk factors include smoking/tobacco exposure. Shortness of Breath  This is a chronic problem. Associated symptoms include cough (chronic smoker cough) and chest pain. Pertinent negatives include no vomiting.       Past Medical History:   Diagnosis Date    Aneurysm ([facility] [address]     cerebral    Anxiety     Asthma     Depression     Diabetes ([facility] [address]     Headache(784.0)     Hypercholesterolemia     Hypertension     Other ill-defined conditions(799.89)     high cholesterol    Other ill-defined conditions(799.89)     pvd    Stroke ([facility] [address] 2006    no residual       Past Surgical History:   Procedure Laterality Date    HX GYN      HX OTHER SURGICAL      excision cyst/bilateral axilla    WI ANEURYSM, INTRACRAN, SIMPLE SURG           Family History:   Problem Relation Age of Onset    Hypertension Mother     Cancer Father     Breast Cancer Maternal Aunt     Breast Cancer Maternal Aunt        Social History     Socioeconomic History    Marital status: SINGLE     Spouse name: Not on file    Number of children: Not on file    Years of education: Not on file    Highest education level: Not on file   Occupational History    Not on file   Tobacco Use    Smoking status: Every Day     Packs/day: 3.00     Years: 50.00     Pack years: 150.00     Types: Cigarettes    Smokeless tobacco: Never   Substance and Sexual Activity    Alcohol use: Yes     Comment: occasional    Drug use: No    Sexual activity: Not on file   Other Topics Concern    Not on file   Social History Narrative    Not on file     Social Determinants of Health     Financial Resource Strain: Unknown    Difficulty of Paying Living Expenses: Patient refused   Food Insecurity: Unknown    Worried About Running Out of Food in the Last Year: Patient refused    Ran Out of Food in the Last Year: Patient refused   Transportation Needs: Not on file   Physical Activity: Not on file   Stress: Not on file   Social Connections: Not on file   Intimate Partner Violence: Not on file   Housing Stability: Not on file         ALLERGIES: Ciprofloxacin    Review of Systems   Respiratory:  Positive for cough (chronic smoker cough) and shortness of breath. Cardiovascular:  Positive for chest pain. Gastrointestinal:  Negative for vomiting. All other systems reviewed and are negative. Vitals:    11/01/22 0041 11/01/22 0056 11/01/22 0126   BP: 123/72     Pulse: (!) 102 (!) 151    Resp: 18 29    Temp: 97.9 °F (36.6 °C)     SpO2: 93% 93% 93%   Weight: 81.6 kg (180 lb)     Height: 5' 2\" (1.575 m)              Physical Exam  Vitals and nursing note reviewed. Constitutional:       General: She is not in acute distress. Appearance: She is well-developed. HENT:      Head: Normocephalic and atraumatic. Eyes:      Conjunctiva/sclera: Conjunctivae normal.   Cardiovascular:      Rate and Rhythm: Normal rate and regular rhythm. Heart sounds: Normal heart sounds. Pulmonary:      Effort: Pulmonary effort is normal. No respiratory distress. Breath sounds: Normal breath sounds. Chest:      Comments: Left posterior chest well at level of 5th rib  Abdominal:      General: There is no distension. Musculoskeletal:         General: No deformity. Normal range of motion. Cervical back: Neck supple. Skin:     General: Skin is warm and dry.    Neurological:      Mental Status: She is alert. Cranial Nerves: No cranial nerve deficit. Psychiatric:         Behavior: Behavior normal.        [facility]    ED Course as of 11/02/22 0155   Tue Nov 01, 2022   0058 EKG 1240: Rate 105, sinus tachycardia. Nonspecific TWA. PVCs resolved from previous. Otherwise no significant change. [DK]      ED Course User Index  [DK] Roe Belle MD     64 y.o. female presents with chest pain starting 2 days ago. Pain is reproducible with palpation and movement, highly atypical in nature. Low risk for ACS with unchanged EKG and negative troponin. Low risk without signs and symptoms of PE. Appears musculoskeletal in nature. Her tachycardia noted on VS was artifactual as she was pulling off equipment and yelling, she was intoxicated during entirety of visit. Patient was recommended to take short course of scheduled NSAIDs and engage in early mobility as definitive treatment. Plan to follow up with PCP as needed and return precautions discussed for worsening or new concerning symptoms.       Procedures

## 2022-11-09 DIAGNOSIS — E11.41 DIABETIC MONONEUROPATHY ASSOCIATED WITH TYPE 2 DIABETES MELLITUS (HCC): ICD-10-CM

## 2022-11-10 RX ORDER — GABAPENTIN 600 MG/1
TABLET ORAL
Qty: 90 TABLET | Refills: 0 | Status: SHIPPED | OUTPATIENT
Start: 2022-11-10

## 2022-11-10 NOTE — TELEPHONE ENCOUNTER
Called and spoke with patient. Advised of 1 month supply sent to pharmacy and that she needs to be seen in order to get further refills. Patient verbalized understanding.  Apt scheduled for 12/6/22

## 2022-12-06 ENCOUNTER — VIRTUAL VISIT (OUTPATIENT)
Dept: FAMILY MEDICINE CLINIC | Age: 62
End: 2022-12-06
Payer: MEDICARE

## 2022-12-06 DIAGNOSIS — Z00.00 MEDICARE ANNUAL WELLNESS VISIT, SUBSEQUENT: ICD-10-CM

## 2022-12-06 DIAGNOSIS — J06.9 ACUTE URI: Primary | ICD-10-CM

## 2022-12-06 DIAGNOSIS — R11.0 NAUSEA: ICD-10-CM

## 2022-12-06 DIAGNOSIS — E11.41 DIABETIC MONONEUROPATHY ASSOCIATED WITH TYPE 2 DIABETES MELLITUS (HCC): ICD-10-CM

## 2022-12-06 PROCEDURE — G0439 PPPS, SUBSEQ VISIT: HCPCS | Performed by: FAMILY MEDICINE

## 2022-12-06 PROCEDURE — 99214 OFFICE O/P EST MOD 30 MIN: CPT | Performed by: FAMILY MEDICINE

## 2022-12-06 PROCEDURE — G9717 DOC PT DX DEP/BP F/U NT REQ: HCPCS | Performed by: FAMILY MEDICINE

## 2022-12-06 PROCEDURE — G9899 SCRN MAM PERF RSLTS DOC: HCPCS | Performed by: FAMILY MEDICINE

## 2022-12-06 PROCEDURE — G8417 CALC BMI ABV UP PARAM F/U: HCPCS | Performed by: FAMILY MEDICINE

## 2022-12-06 PROCEDURE — G8756 NO BP MEASURE DOC: HCPCS | Performed by: FAMILY MEDICINE

## 2022-12-06 PROCEDURE — 3017F COLORECTAL CA SCREEN DOC REV: CPT | Performed by: FAMILY MEDICINE

## 2022-12-06 PROCEDURE — 2022F DILAT RTA XM EVC RTNOPTHY: CPT | Performed by: FAMILY MEDICINE

## 2022-12-06 PROCEDURE — G8427 DOCREV CUR MEDS BY ELIG CLIN: HCPCS | Performed by: FAMILY MEDICINE

## 2022-12-06 PROCEDURE — 3044F HG A1C LEVEL LT 7.0%: CPT | Performed by: FAMILY MEDICINE

## 2022-12-06 RX ORDER — GABAPENTIN 600 MG/1
TABLET ORAL
Qty: 270 TABLET | Refills: 1 | Status: SHIPPED | OUTPATIENT
Start: 2022-12-06

## 2022-12-06 RX ORDER — AZITHROMYCIN 250 MG/1
TABLET, FILM COATED ORAL
Qty: 6 TABLET | Refills: 0 | Status: SHIPPED | OUTPATIENT
Start: 2022-12-06

## 2022-12-06 RX ORDER — ONDANSETRON 8 MG/1
8 TABLET, ORALLY DISINTEGRATING ORAL
Qty: 20 TABLET | Refills: 1 | Status: SHIPPED | OUTPATIENT
Start: 2022-12-06

## 2022-12-06 NOTE — PATIENT INSTRUCTIONS
Medicare Wellness Visit, Female     The best way to live healthy is to have a lifestyle where you eat a well-balanced diet, exercise regularly, limit alcohol use, and quit all forms of tobacco/nicotine, if applicable. Regular preventive services are another way to keep healthy. Preventive services (vaccines, screening tests, monitoring & exams) can help personalize your care plan, which helps you manage your own care. Screening tests can find health problems at the earliest stages, when they are easiest to treat. [facility] follows the current, evidence-based guidelines published by the [facility] [employer] ([facility]) when recommending preventive services for our patients. Because we follow these guidelines, sometimes recommendations change over time as research supports it. (For example, mammograms used to be recommended annually. Even though Medicare will still pay for an annual mammogram, the newer guidelines recommend a mammogram every two years for women of average risk). Of course, you and your doctor may decide to screen more often for some diseases, based on your risk and your co-morbidities (chronic disease you are already diagnosed with). Preventive services for you include:  - Medicare offers their members a free annual wellness visit, which is time for you and your primary care provider to discuss and plan for your preventive service needs.  Take advantage of this benefit every year!    -Over the age of 72 should receive the recommended pneumonia vaccines.    -All adults should have a flu vaccine yearly.  -All adults should have a tetanus vaccine every 10 years.   -Over the age 48 should receive the shingles vaccines.        -All adults should be screened once for Hepatitis C.  -All adults age 38-68 who are overweight should have a diabetes screening test once every three years.   -Other screening tests and preventive services for persons with diabetes include: an eye exam to screen for diabetic retinopathy, a kidney function test, a foot exam, and stricter control over your cholesterol.   -Cardiovascular screening for adults with routine risk involves an electrocardiogram (ECG) at intervals determined by your doctor.     -Colorectal cancer screenings should be done for adults age 39-70 with no increased risk factors for colorectal cancer. There are a number of acceptable methods of screening for this type of cancer. Each test has its own benefits and drawbacks. Discuss with your doctor what is most appropriate for you during your annual wellness visit. The different tests include: colonoscopy (considered the best screening method), a fecal occult blood test, a fecal DNA test, and sigmoidoscopy.    -Lung cancer screening is recommended annually with a low dose CT scan for adults between age 54 and 68, who have smoked at least 30 pack years (equivalent of 1 pack per day for 30 days), and who is a current smoker or quit less than 15 years ago.    -A bone mass density test is recommended when a woman turns 65 to screen for osteoporosis. This test is only recommended one time, as a screening. Some providers will use this same test as a disease monitoring tool if you already have osteoporosis. -Breast cancer screenings are recommended every other year for women of normal risk, age 54-69.    -Cervical cancer screenings for women over age 72 are only recommended with certain risk factors.      Here is a list of your current Health Maintenance items (your personalized list of preventive services) with a due date:  Health Maintenance Due   Topic Date Due    Cervical cancer screen  Never done    Shingles Vaccine (1 of 2) Never done    COVID-19 Vaccine (3 - Booster for Verastem series) 05/31/2021    Yearly Flu Vaccine (1) 08/01/2022

## 2022-12-06 NOTE — PROGRESS NOTES
Progress Note    she is a 64y.o. year old female who presents for evalution. Subjective:     Patient requesting refill of gabapentin for diabetic neuropathy. Helps with her discomfort. Not having any issues with side effects. States she is been sick for the past couple of weeks going into her chest.  She is an active smoker. Coughing productive green thick mucus some shortness of breath using her Advair and her albuterol. No fever or chills. She is having some nausea requesting refill of Zofran. No vomiting      Reviewed PmHx, RxHx, FmHx, SocHx, AllgHx and updated and dated in the chart. Review of Systems - negative except as listed above in the HPI    Objective: There were no vitals filed for this visit. Current Outpatient Medications   Medication Sig    azithromycin (ZITHROMAX) 250 mg tablet Take 2 tablets today, then take 1 tablet daily    gabapentin (NEURONTIN) 600 mg tablet TAKE 1 TABLET BY MOUTH 3 TIMES DAILY AT 9AM, 1PM, 5PM *MAX DAILY DOSE: 1,800MG*    ondansetron (ZOFRAN ODT) 8 mg disintegrating tablet Take 1 Tablet by mouth every eight (8) hours as needed for Nausea. acetaminophen (TYLENOL) 500 mg tablet Take 2 Tablets by mouth every six (6) hours as needed for Pain or Fever. lidocaine 4 % patch Apply every 12 hours as needed    lisinopriL (PRINIVIL, ZESTRIL) 40 mg tablet TAKE 1 TABLET BY MOUTH DAILY AT 9AM    dilTIAZem ER (CARDIZEM CD) 240 mg capsule TAKE 1 CAPSULE BY MOUTH EVERY DAY    triamcinolone acetonide (KENALOG) 0.1 % topical cream APPLY A THIN LAYER TO AFFECTED AREAS TOPICALLY TWICE DAILY no longer than 2 weeks at a time (Patient not taking: Reported on 11/1/2022)    albuterol (PROVENTIL HFA, VENTOLIN HFA, PROAIR HFA) 90 mcg/actuation inhaler INHALE 2 PUFFS EVERY 4 HOURS AS NEEDED SHORTNESS OF BREATH/WHEEZE    naproxen sodium (NAPROSYN) 220 mg tablet Take 220 mg by mouth two (2) times daily (with meals).  (Patient not taking: Reported on 11/1/2022) rosuvastatin (CRESTOR) 20 mg tablet Take 1 Tablet by mouth nightly. pantoprazole (PROTONIX) 40 mg tablet Take 1 Tablet by mouth daily. (Patient not taking: Reported on 11/1/2022)    insulin degludec-liraglutide (Xultophy 100/3.6) 100 unit-3.6 mg /mL (3 mL) inpn 50 UNITS DAILY subq. fluticasone propion-salmeteroL (Advair Diskus) 250-50 mcg/dose diskus inhaler INHALE 1 PUFF BY MOUTH TWICE DAILY wixela if preferred    rivaroxaban (Xarelto) 20 mg tab tablet TAKE 1 TABLET BY MOUTH EVERY DAY WITH DINNER    Insulin Needles, Disposable, (Mia Pen Needle) 32 gauge x 5/32\" ndle USE DAILY WITH TRESIBA    lancets misc Check BG bid on insulin E11.65    alcohol swabs (Alcohol Prep Pads) padm Check glucose bid and one insulin shot per day    glucose blood VI test strips (ASCENSIA AUTODISC VI, ONE TOUCH ULTRA TEST VI) strip USE TO CHECK BLOOD GLUCOSE TWICE DAILY, on insulin E11.65, one touch    nitroglycerin (NITROSTAT) 0.4 mg SL tablet TAKE 1 TAB UNDER TONGUE EVERY 5 MINUTES AS NEEDED FOR CHEST PAIN    PSEUDOEPHEDRINE-guaiFENesin (MUCINEX D)  mg per tablet Take 1 Tablet by mouth every twelve (12) hours. (Patient not taking: Reported on 11/1/2022)    fexofenadine (ALLEGRA) 180 mg tablet Take  by mouth. (Patient not taking: Reported on 6/9/2022)    allopurinoL (ZYLOPRIM) 100 mg tablet TAKE 1 TABLET BY MOUTH DAILY AS NEEDED FOR GOUT PAIN    colchicine (Colcrys) 0.6 mg tablet 1 tab PO once daily until gout symptoms resolved (Patient not taking: Reported on 7/6/2021)    OneTouch Ultra Blue Test Strip strip USE TO CHECK BLOOD GLUCOSE TWICE DAILY    tiotropium bromide (Spiriva Respimat) 2.5 mcg/actuation inhaler Take 2 Puffs by inhalation daily.  (Patient not taking: Reported on 6/9/2022)    insulin aspart U-100 (NovoLOG Flexpen U-100 Insulin) 100 unit/mL (3 mL) inpn ADMINISTER 12 UNITS UNDER THE SKIN BEFORE MEALS (Patient not taking: Reported on 7/6/2021)    insulin degludec Brannon Mary FlexTouch U-100) 100 unit/mL (3 mL) inpn INJECT 30 UNITS UNDER THE SKIN AT NIGHT (Patient not taking: Reported on 7/6/2021)    Blood-Glucose Meter monitoring kit Check BG bid on insulin E11.65 (Patient not taking: Reported on 6/9/2022)    albuterol (PROVENTIL VENTOLIN) 2.5 mg /3 mL (0.083 %) nebulizer solution USE 1 VIAL VIA NEBULIZER Q 4 H PRF WHEEZING    mupirocin (BACTROBAN) 2 % ointment Apply  to affected area daily. (Patient not taking: Reported on 7/6/2021)    aspirin delayed-release 81 mg tablet Take  by mouth daily. Insulin Needles, Disposable, 31 gauge x 5/16\" ndle For application of insulin. Insulin Syringes, Disposable, 1 mL syrg 1 Syringe by Does Not Apply route two (2) times a day. Before breakfast and 12 hours later. No current facility-administered medications for this visit. Physical Examination: General appearance - alert, well appearing, and in no distress  Chest -wet sounding cough during visit. Raspy voice    Assessment/ Plan:   Diagnoses and all orders for this visit:    1. Acute URI  -     azithromycin (ZITHROMAX) 250 mg tablet; Take 2 tablets today, then take 1 tablet daily    2. Diabetic mononeuropathy associated with type 2 diabetes mellitus (HCC)  -     gabapentin (NEURONTIN) 600 mg tablet; TAKE 1 TABLET BY MOUTH 3 TIMES DAILY AT 9AM, 1PM, 5PM *MAX DAILY DOSE: 1,800MG*    3. Medicare annual wellness visit, subsequent    4. Nausea  -     ondansetron (ZOFRAN ODT) 8 mg disintegrating tablet; Take 1 Tablet by mouth every eight (8) hours as needed for Nausea. Follow-up and Dispositions    Return in about 6 months (around 6/6/2023), or if symptoms worsen or fail to improve. I have discussed the diagnosis with the patient and the intended plan as seen in the above orders. The patient has received an after-visit summary and questions were answered concerning future plans. Pt conveyed understanding of plan.     Medication Side Effects and Warnings were discussed with patient      Farhan Del Valle is being evaluated by a Virtual Visit (video visit) encounter to address concerns as mentioned above. A caregiver was present when appropriate. Due to this being a TeleHealth encounter (During GYMBX-04 public health emergency), evaluation of the following organ systems was limited: Vitals/Constitutional/EENT/Resp/CV/GI//MS/Neuro/Skin/Heme-Lymph-Imm. Pursuant to the emergency declaration under the [address] [place] [address] and the [employer] and [employer] Act, this Virtual Visit was conducted with patient's (and/or legal guardian's) consent, to reduce the patient's risk of exposure to COVID-19 and provide necessary medical care. The patient (and/or legal guardian) has also been advised to contact this office for worsening conditions or problems, and seek emergency medical treatment and/or call 911 if deemed necessary. Patient identification was verified at the start of the visit: Yes    Services were provided through a video synchronous discussion virtually to substitute for in-person clinic visit. Patient and provider were located at their individual homes. --Stevan Riddle DO on 12/6/2022 at 3:52 PM    This is the Subsequent Medicare Annual Wellness Exam, performed 12 months or more after the Initial AWV or the last Subsequent AWV    I have reviewed the patient's medical history in detail and updated the computerized patient record. Assessment/Plan   Education and counseling provided:  Are appropriate based on today's review and evaluation    1. Acute URI  -     azithromycin (ZITHROMAX) 250 mg tablet; Take 2 tablets today, then take 1 tablet daily, Normal, Disp-6 Tablet, R-0  2. Diabetic mononeuropathy associated with type 2 diabetes mellitus (HCC)  -     gabapentin (NEURONTIN) 600 mg tablet; TAKE 1 TABLET BY MOUTH 3 TIMES DAILY AT 9AM, 1PM, 5PM *MAX DAILY DOSE: 1,800MG*, Normal, Disp-270 Tablet, R-1  3.  Medicare annual wellness visit, subsequent  4. Nausea  -     ondansetron (ZOFRAN ODT) 8 mg disintegrating tablet; Take 1 Tablet by mouth every eight (8) hours as needed for Nausea., Normal, Disp-20 Tablet, R-1       Depression Risk Factor Screening:     3 most recent PHQ Screens 12/6/2022   Little interest or pleasure in doing things Not at all   Feeling down, depressed, irritable, or hopeless Not at all   Total Score PHQ 2 0       Alcohol & Drug Abuse Risk Screen    Do you average more than 1 drink per night or more than 7 drinks a week:  No    On any one occasion in the past three months have you have had more than 3 drinks containing alcohol:  No          Functional Ability and Level of Safety:    Hearing: Hearing is good. Activities of Daily Living: The home contains: no safety equipment. Patient does total self care      Ambulation: with no difficulty     Fall Risk:  Fall Risk Assessment, last 12 mths 4/20/2016   Able to walk? Yes   Fall in past 12 months? No      Abuse Screen:  Patient is not abused       Cognitive Screening    Has your family/caregiver stated any concerns about your memory: no        Health Maintenance Due     Health Maintenance Due   Topic Date Due    Cervical cancer screen  Never done    Shingrix Vaccine Age 49> (1 of 2) Never done    COVID-19 Vaccine (3 - Booster for Hernandez Peter series) 05/31/2021    Flu Vaccine (1) 08/01/2022       Patient Care Team   Patient Care Team:  Mica Cortes DO as PCP - General (Family Medicine)  Mica Cortes DO as PCP - [facility]  Tripp Parrish MD (Cardiovascular Disease Physician)    History     Patient Active Problem List   Diagnosis Code    DM (diabetes mellitus) ([facility] E11.9    HTN (hypertension) I10    COPD (chronic obstructive pulmonary disease) ([facility] J44.9    Depression F32. A    Sebaceous cyst L72.3    Diabetic neuropathy (HCC) E11.40    Migraine G43.909    Hypercholesteremia E78.00    Obesity (BMI 30-39. 9) E66.9    PVD (peripheral vascular disease) ([facility] I73.9    Hyperglycemia due to type 2 diabetes mellitus ([facility] E11.65    Tobacco abuse Z72.0    Type 2 diabetes mellitus with hyperglycemia ([facility] E11.65    Microalbuminuria R80.9    Coronary artery disease involving native coronary artery of native heart without angina pectoris I25.10    Severe obesity (HCC) E66.01     Past Medical History:   Diagnosis Date    Aneurysm ([facility]     cerebral    Anxiety     Asthma     Depression     Diabetes ([facility]     Headache(784.0)     Hypercholesterolemia     Hypertension     Other ill-defined conditions(799.89)     high cholesterol    Other ill-defined conditions(799.89)     pvd    Stroke ([facility] 2006    no residual      Past Surgical History:   Procedure Laterality Date    HX GYN      HX OTHER SURGICAL      excision cyst/bilateral axilla    AK ANEURYSM, INTRACRAN, SIMPLE SURG       Current Outpatient Medications   Medication Sig Dispense Refill    azithromycin (ZITHROMAX) 250 mg tablet Take 2 tablets today, then take 1 tablet daily 6 Tablet 0    gabapentin (NEURONTIN) 600 mg tablet TAKE 1 TABLET BY MOUTH 3 TIMES DAILY AT 9AM, 1PM, 5PM *MAX DAILY DOSE: 1,800MG* 270 Tablet 1    ondansetron (ZOFRAN ODT) 8 mg disintegrating tablet Take 1 Tablet by mouth every eight (8) hours as needed for Nausea. 20 Tablet 1    acetaminophen (TYLENOL) 500 mg tablet Take 2 Tablets by mouth every six (6) hours as needed for Pain or Fever.  20 Tablet 0    lidocaine 4 % patch Apply every 12 hours as needed 30 Each 0    lisinopriL (PRINIVIL, ZESTRIL) 40 mg tablet TAKE 1 TABLET BY MOUTH DAILY AT 9AM 30 Tablet 10    dilTIAZem ER (CARDIZEM CD) 240 mg capsule TAKE 1 CAPSULE BY MOUTH EVERY DAY 90 Capsule 3    triamcinolone acetonide (KENALOG) 0.1 % topical cream APPLY A THIN LAYER TO AFFECTED AREAS TOPICALLY TWICE DAILY no longer than 2 weeks at a time (Patient not taking: Reported on 11/1/2022) 30 g 2    albuterol (PROVENTIL HFA, VENTOLIN HFA, PROAIR HFA) 90 mcg/actuation inhaler INHALE 2 PUFFS EVERY 4 HOURS AS NEEDED SHORTNESS OF BREATH/WHEEZE 8.5 Each 5    naproxen sodium (NAPROSYN) 220 mg tablet Take 220 mg by mouth two (2) times daily (with meals). (Patient not taking: Reported on 11/1/2022)      rosuvastatin (CRESTOR) 20 mg tablet Take 1 Tablet by mouth nightly. 90 Tablet 4    pantoprazole (PROTONIX) 40 mg tablet Take 1 Tablet by mouth daily. (Patient not taking: Reported on 11/1/2022) 90 Tablet 4    insulin degludec-liraglutide (Xultophy 100/3.6) 100 unit-3.6 mg /mL (3 mL) inpn 50 UNITS DAILY subq. 45 mL 3    fluticasone propion-salmeteroL (Advair Diskus) 250-50 mcg/dose diskus inhaler INHALE 1 PUFF BY MOUTH TWICE DAILY wixela if preferred 3 Each 3    rivaroxaban (Xarelto) 20 mg tab tablet TAKE 1 TABLET BY MOUTH EVERY DAY WITH DINNER 90 Tablet 2    Insulin Needles, Disposable, (Mia Pen Needle) 32 gauge x 5/32\" ndle USE DAILY WITH TRESIBA 100 Pen Needle 11    lancets misc Check BG bid on insulin E11.65 200 Each 3    alcohol swabs (Alcohol Prep Pads) padm Check glucose bid and one insulin shot per day 300 Pad 4    glucose blood VI test strips (ASCENSIA AUTODISC VI, ONE TOUCH ULTRA TEST VI) strip USE TO CHECK BLOOD GLUCOSE TWICE DAILY, on insulin E11.65, one touch 200 Strip 4    nitroglycerin (NITROSTAT) 0.4 mg SL tablet TAKE 1 TAB UNDER TONGUE EVERY 5 MINUTES AS NEEDED FOR CHEST PAIN 75 Tablet 1    PSEUDOEPHEDRINE-guaiFENesin (MUCINEX D)  mg per tablet Take 1 Tablet by mouth every twelve (12) hours. (Patient not taking: Reported on 11/1/2022)      fexofenadine (ALLEGRA) 180 mg tablet Take  by mouth.  (Patient not taking: Reported on 6/9/2022)      allopurinoL (ZYLOPRIM) 100 mg tablet TAKE 1 TABLET BY MOUTH DAILY AS NEEDED FOR GOUT PAIN 30 Tab 1    colchicine (Colcrys) 0.6 mg tablet 1 tab PO once daily until gout symptoms resolved (Patient not taking: Reported on 7/6/2021) 30 Tab 2    OneTouch Ultra Blue Test Strip strip USE TO CHECK BLOOD GLUCOSE TWICE DAILY 200 Strip 3    tiotropium bromide (Spiriva Respimat) 2.5 mcg/actuation inhaler Take 2 Puffs by inhalation daily. (Patient not taking: Reported on 6/9/2022) 1 Inhaler 11    insulin aspart U-100 (NovoLOG Flexpen U-100 Insulin) 100 unit/mL (3 mL) inpn ADMINISTER 12 UNITS UNDER THE SKIN BEFORE MEALS (Patient not taking: Reported on 7/6/2021) 15 mL 5    insulin degludec Dorothye Teto FlexTouch U-100) 100 unit/mL (3 mL) inpn INJECT 30 UNITS UNDER THE SKIN AT NIGHT (Patient not taking: Reported on 7/6/2021) 18 mL 1    Blood-Glucose Meter monitoring kit Check BG bid on insulin E11.65 (Patient not taking: Reported on 6/9/2022) 1 Kit 0    albuterol (PROVENTIL VENTOLIN) 2.5 mg /3 mL (0.083 %) nebulizer solution USE 1 VIAL VIA NEBULIZER Q 4 H PRF WHEEZING  0    mupirocin (BACTROBAN) 2 % ointment Apply  to affected area daily. (Patient not taking: Reported on 7/6/2021) 22 g 0    aspirin delayed-release 81 mg tablet Take  by mouth daily. Insulin Needles, Disposable, 31 gauge x 9/91\" ndle For application of insulin. 1 Package 11    Insulin Syringes, Disposable, 1 mL syrg 1 Syringe by Does Not Apply route two (2) times a day. Before breakfast and 12 hours later. 60 Syringe 3     Allergies   Allergen Reactions    Ciprofloxacin Nausea Only and Vertigo       Family History   Problem Relation Age of Onset    Hypertension Mother     Cancer Father     Breast Cancer Maternal Aunt     Breast Cancer Maternal Aunt      Social History     Tobacco Use    Smoking status: Every Day     Packs/day: 3.00     Years: 50.00     Pack years: 150.00     Types: Cigarettes    Smokeless tobacco: Never   Substance Use Topics    Alcohol use: Yes     Comment: occasional       Megan Waldenlas, was evaluated through a synchronous (real-time) audio-video encounter. The patient (or guardian if applicable) is aware that this is a billable service, which includes applicable co-pays. This Virtual Visit was conducted with patient's (and/or legal guardian's) consent.  The visit was conducted pursuant to the emergency declaration under the [facility] authority and the [employer] and [employer] Act. Patient identification was verified, and a caregiver was present when appropriate.   The patient was located at: Home: [address]  [email or web address]: Home: [address] [place] [address],

## 2022-12-11 DIAGNOSIS — E11.41 DIABETIC MONONEUROPATHY ASSOCIATED WITH TYPE 2 DIABETES MELLITUS (HCC): ICD-10-CM

## 2022-12-13 RX ORDER — GABAPENTIN 600 MG/1
TABLET ORAL
Qty: 90 TABLET | Refills: 10 | OUTPATIENT
Start: 2022-12-13

## 2022-12-18 DIAGNOSIS — E11.41 DIABETIC MONONEUROPATHY ASSOCIATED WITH TYPE 2 DIABETES MELLITUS (HCC): ICD-10-CM

## 2022-12-20 RX ORDER — GABAPENTIN 600 MG/1
TABLET ORAL
Qty: 270 TABLET | Refills: 1 | Status: SHIPPED | OUTPATIENT
Start: 2022-12-20

## 2022-12-20 NOTE — TELEPHONE ENCOUNTER
Can you please see what is going on with this?   I had refilled it for her 2 weeks ago at her appointment

## 2022-12-21 RX ORDER — DOXYCYCLINE 100 MG/1
100 TABLET ORAL 2 TIMES DAILY
Qty: 20 TABLET | Refills: 0 | Status: SHIPPED | OUTPATIENT
Start: 2022-12-21 | End: 2022-12-31

## 2023-01-16 ENCOUNTER — TELEPHONE (OUTPATIENT)
Dept: FAMILY MEDICINE CLINIC | Age: 63
End: 2023-01-16

## 2023-01-17 ENCOUNTER — VIRTUAL VISIT (OUTPATIENT)
Dept: FAMILY MEDICINE CLINIC | Age: 63
End: 2023-01-17
Payer: MEDICARE

## 2023-01-17 ENCOUNTER — TELEPHONE (OUTPATIENT)
Dept: FAMILY MEDICINE CLINIC | Age: 63
End: 2023-01-17

## 2023-01-17 DIAGNOSIS — E11.41 CONTROLLED TYPE 2 DIABETES MELLITUS WITH DIABETIC MONONEUROPATHY, WITH LONG-TERM CURRENT USE OF INSULIN (HCC): ICD-10-CM

## 2023-01-17 DIAGNOSIS — Z79.4 CONTROLLED TYPE 2 DIABETES MELLITUS WITH DIABETIC MONONEUROPATHY, WITH LONG-TERM CURRENT USE OF INSULIN (HCC): ICD-10-CM

## 2023-01-17 DIAGNOSIS — I25.10 CORONARY ARTERY DISEASE INVOLVING NATIVE CORONARY ARTERY OF NATIVE HEART WITHOUT ANGINA PECTORIS: Primary | ICD-10-CM

## 2023-01-17 DIAGNOSIS — R05.1 ACUTE COUGH: ICD-10-CM

## 2023-01-17 DIAGNOSIS — I10 PRIMARY HYPERTENSION: ICD-10-CM

## 2023-01-17 PROCEDURE — 3046F HEMOGLOBIN A1C LEVEL >9.0%: CPT | Performed by: FAMILY MEDICINE

## 2023-01-17 PROCEDURE — 99214 OFFICE O/P EST MOD 30 MIN: CPT | Performed by: FAMILY MEDICINE

## 2023-01-17 PROCEDURE — 3017F COLORECTAL CA SCREEN DOC REV: CPT | Performed by: FAMILY MEDICINE

## 2023-01-17 PROCEDURE — G9899 SCRN MAM PERF RSLTS DOC: HCPCS | Performed by: FAMILY MEDICINE

## 2023-01-17 PROCEDURE — G8427 DOCREV CUR MEDS BY ELIG CLIN: HCPCS | Performed by: FAMILY MEDICINE

## 2023-01-17 PROCEDURE — G9717 DOC PT DX DEP/BP F/U NT REQ: HCPCS | Performed by: FAMILY MEDICINE

## 2023-01-17 PROCEDURE — G8417 CALC BMI ABV UP PARAM F/U: HCPCS | Performed by: FAMILY MEDICINE

## 2023-01-17 PROCEDURE — 2022F DILAT RTA XM EVC RTNOPTHY: CPT | Performed by: FAMILY MEDICINE

## 2023-01-17 RX ORDER — CODEINE PHOSPHATE AND GUAIFENESIN 10; 100 MG/5ML; MG/5ML
5 SOLUTION ORAL
Qty: 120 ML | Refills: 0 | Status: SHIPPED | OUTPATIENT
Start: 2023-01-17 | End: 2023-01-25

## 2023-01-17 NOTE — PATIENT INSTRUCTIONS

## 2023-01-17 NOTE — TELEPHONE ENCOUNTER
Then she will need to pay for it out-of-pocket otherwise she could buy something over-the-counter like Robitussin-DM or Mucinex DM.

## 2023-01-17 NOTE — TELEPHONE ENCOUNTER
Patient wanted to let nurse know she will pay for her cough syrup out of pocket.  Patient's phone: [phone]

## 2023-01-17 NOTE — PROGRESS NOTES
Progress Note    she is a 58y.o. year old female who presents for evalution. Subjective:     Patient states she had a blood test done through her insurance that she received a letter saying that her A1c is high at 5.8. Patient is a type II diabetic and this is actually better than it was the last time and is well controlled for her. Her last A1c was 6.1. She is having no issues with lows on the Xultophy 50 units daily. She is due for fasting labs so she will get an appointment scheduled for in the office regarding her diabetes and coronary artery disease. Patient reports having some nausea she is going to refill her Zofran. Discussed making an appointment with GI she does not want to at this time. She is reporting a cough that is keeping her up at night. She is questing some cough syrup for this. Tried over-the-counter without much relief. No fever or chills. No chest pain no shortness of breath. Reviewed PmHx, RxHx, FmHx, SocHx, AllgHx and updated and dated in the chart. Review of Systems - negative except as listed above in the HPI    Objective: There were no vitals filed for this visit. Current Outpatient Medications   Medication Sig    guaiFENesin-codeine (ROBITUSSIN AC) 100-10 mg/5 mL solution Take 5 mL by mouth three (3) times daily as needed for Cough for up to 8 days. Max Daily Amount: 15 mL. gabapentin (NEURONTIN) 600 mg tablet TAKE 1 TABLET BY MOUTH 3 TIMES DAILY AT 9AM, 1PM AND 5PM * MAX DAILY DOSE 1800MG *    azithromycin (ZITHROMAX) 250 mg tablet Take 2 tablets today, then take 1 tablet daily    ondansetron (ZOFRAN ODT) 8 mg disintegrating tablet Take 1 Tablet by mouth every eight (8) hours as needed for Nausea. acetaminophen (TYLENOL) 500 mg tablet Take 2 Tablets by mouth every six (6) hours as needed for Pain or Fever.     lidocaine 4 % patch Apply every 12 hours as needed    lisinopriL (PRINIVIL, ZESTRIL) 40 mg tablet TAKE 1 TABLET BY MOUTH DAILY AT 9AM dilTIAZem ER (CARDIZEM CD) 240 mg capsule TAKE 1 CAPSULE BY MOUTH EVERY DAY    triamcinolone acetonide (KENALOG) 0.1 % topical cream APPLY A THIN LAYER TO AFFECTED AREAS TOPICALLY TWICE DAILY no longer than 2 weeks at a time (Patient not taking: Reported on 11/1/2022)    albuterol (PROVENTIL HFA, VENTOLIN HFA, PROAIR HFA) 90 mcg/actuation inhaler INHALE 2 PUFFS EVERY 4 HOURS AS NEEDED SHORTNESS OF BREATH/WHEEZE    naproxen sodium (NAPROSYN) 220 mg tablet Take 220 mg by mouth two (2) times daily (with meals). (Patient not taking: Reported on 11/1/2022)    rosuvastatin (CRESTOR) 20 mg tablet Take 1 Tablet by mouth nightly. pantoprazole (PROTONIX) 40 mg tablet Take 1 Tablet by mouth daily. (Patient not taking: Reported on 11/1/2022)    insulin degludec-liraglutide (Xultophy 100/3.6) 100 unit-3.6 mg /mL (3 mL) inpn 50 UNITS DAILY subq. fluticasone propion-salmeteroL (Advair Diskus) 250-50 mcg/dose diskus inhaler INHALE 1 PUFF BY MOUTH TWICE DAILY wixela if preferred    rivaroxaban (Xarelto) 20 mg tab tablet TAKE 1 TABLET BY MOUTH EVERY DAY WITH DINNER    Insulin Needles, Disposable, (Mia Pen Needle) 32 gauge x 5/32\" ndle USE DAILY WITH TRESIBA    lancets misc Check BG bid on insulin E11.65    alcohol swabs (Alcohol Prep Pads) padm Check glucose bid and one insulin shot per day    glucose blood VI test strips (ASCENSIA AUTODISC VI, ONE TOUCH ULTRA TEST VI) strip USE TO CHECK BLOOD GLUCOSE TWICE DAILY, on insulin E11.65, one touch    nitroglycerin (NITROSTAT) 0.4 mg SL tablet TAKE 1 TAB UNDER TONGUE EVERY 5 MINUTES AS NEEDED FOR CHEST PAIN    PSEUDOEPHEDRINE-guaiFENesin (MUCINEX D)  mg per tablet Take 1 Tablet by mouth every twelve (12) hours. (Patient not taking: Reported on 11/1/2022)    fexofenadine (ALLEGRA) 180 mg tablet Take  by mouth.  (Patient not taking: Reported on 6/9/2022)    allopurinoL (ZYLOPRIM) 100 mg tablet TAKE 1 TABLET BY MOUTH DAILY AS NEEDED FOR GOUT PAIN    colchicine (Colcrys) 0.6 mg tablet 1 tab PO once daily until gout symptoms resolved (Patient not taking: Reported on 7/6/2021)    OneTouch Ultra Blue Test Strip strip USE TO CHECK BLOOD GLUCOSE TWICE DAILY    tiotropium bromide (Spiriva Respimat) 2.5 mcg/actuation inhaler Take 2 Puffs by inhalation daily. (Patient not taking: Reported on 6/9/2022)    insulin aspart U-100 (NovoLOG Flexpen U-100 Insulin) 100 unit/mL (3 mL) inpn ADMINISTER 12 UNITS UNDER THE SKIN BEFORE MEALS (Patient not taking: Reported on 7/6/2021)    insulin degludec Christia Viky FlexTouch U-100) 100 unit/mL (3 mL) inpn INJECT 30 UNITS UNDER THE SKIN AT NIGHT (Patient not taking: Reported on 7/6/2021)    Blood-Glucose Meter monitoring kit Check BG bid on insulin E11.65 (Patient not taking: Reported on 6/9/2022)    albuterol (PROVENTIL VENTOLIN) 2.5 mg /3 mL (0.083 %) nebulizer solution USE 1 VIAL VIA NEBULIZER Q 4 H PRF WHEEZING    mupirocin (BACTROBAN) 2 % ointment Apply  to affected area daily. (Patient not taking: Reported on 7/6/2021)    aspirin delayed-release 81 mg tablet Take  by mouth daily. Insulin Needles, Disposable, 31 gauge x 7/98\" ndle For application of insulin. Insulin Syringes, Disposable, 1 mL syrg 1 Syringe by Does Not Apply route two (2) times a day. Before breakfast and 12 hours later. No current facility-administered medications for this visit. Physical Examination: General appearance - alert, well appearing, and in no distress  Chest -normal respiratory effort during visit. Assessment/ Plan:   Diagnoses and all orders for this visit:    1. Coronary artery disease involving native coronary artery of native heart without angina pectoris  Patient will schedule appointment for in office for fasting labs. 2. Primary hypertension  Due for an office check. 3. Acute cough  -     guaiFENesin-codeine (ROBITUSSIN AC) 100-10 mg/5 mL solution; Take 5 mL by mouth three (3) times daily as needed for Cough for up to 8 days.  Max Daily Amount: 15 mL.  Mucinex 12-hour as well. 4. Controlled type 2 diabetes mellitus with diabetic mononeuropathy, with long-term current use of insulin ([facility])  A1c through insurance recently was 5.8. We will check labs once she is seen in the office. She had questions regarding her Xultophy and advised to continue to 50 units and she is having no lows. Follow-up and Dispositions    Return if symptoms worsen or fail to improve. I have discussed the diagnosis with the patient and the intended plan as seen in the above orders. The patient has received an after-visit summary and questions were answered concerning future plans. Pt conveyed understanding of plan. Medication Side Effects and Warnings were discussed with patient      Sheryle Prima is being evaluated by a Virtual Visit (video visit) encounter to address concerns as mentioned above. A caregiver was present when appropriate. Due to this being a TeleHealth encounter (During [facility]- public health emergency), evaluation of the following organ systems was limited: Vitals/Constitutional/EENT/Resp/CV/GI//MS/Neuro/Skin/Heme-Lymph-Imm. Pursuant to the emergency declaration under the [address] [place] [address], [address] [place] [address] authority and the [employer] and [employer] Act, this Virtual Visit was conducted with patient's (and/or legal guardian's) consent, to reduce the patient's risk of exposure to COVID-19 and provide necessary medical care. The patient (and/or legal guardian) has also been advised to contact this office for worsening conditions or problems, and seek emergency medical treatment and/or call 911 if deemed necessary. Patient identification was verified at the start of the visit: Yes    Services were provided through a video synchronous discussion virtually to substitute for in-person clinic visit. Patient and provider were located at their individual homes.   --Ivett Lynn DO on 1/17/2023 at 2:24 PM

## 2023-03-30 DIAGNOSIS — J42 CHRONIC BRONCHITIS, UNSPECIFIED CHRONIC BRONCHITIS TYPE (HCC): ICD-10-CM

## 2023-03-31 RX ORDER — ALBUTEROL SULFATE 90 UG/1
AEROSOL, METERED RESPIRATORY (INHALATION)
Qty: 8.5 G | Refills: 10 | Status: SHIPPED | OUTPATIENT
Start: 2023-03-31

## 2023-04-02 DIAGNOSIS — I48.0 PAROXYSMAL ATRIAL FIBRILLATION (HCC): ICD-10-CM

## 2023-04-29 DIAGNOSIS — R11.0 NAUSEA: ICD-10-CM

## 2023-05-01 DIAGNOSIS — L40.3 PUSTULAR PSORIASIS OF PALMS AND SOLES: ICD-10-CM

## 2023-05-01 DIAGNOSIS — I48.0 PAROXYSMAL ATRIAL FIBRILLATION (HCC): ICD-10-CM

## 2023-05-01 DIAGNOSIS — J42 CHRONIC BRONCHITIS, UNSPECIFIED CHRONIC BRONCHITIS TYPE (HCC): ICD-10-CM

## 2023-05-02 RX ORDER — ONDANSETRON 8 MG/1
TABLET, ORALLY DISINTEGRATING ORAL
Qty: 20 TABLET | Refills: 4 | Status: SHIPPED | OUTPATIENT
Start: 2023-05-02

## 2023-05-02 RX ORDER — DILTIAZEM HYDROCHLORIDE 240 MG/1
CAPSULE, COATED, EXTENDED RELEASE ORAL
Qty: 30 CAPSULE | Refills: 10 | Status: SHIPPED | OUTPATIENT
Start: 2023-05-02

## 2023-05-02 RX ORDER — TRIAMCINOLONE ACETONIDE 1 MG/G
CREAM TOPICAL
Qty: 30 G | Refills: 10 | Status: SHIPPED | OUTPATIENT
Start: 2023-05-02

## 2023-05-02 RX ORDER — FLUTICASONE PROPIONATE AND SALMETEROL 250; 50 UG/1; UG/1
POWDER RESPIRATORY (INHALATION)
Qty: 60 EACH | Refills: 10 | Status: SHIPPED | OUTPATIENT
Start: 2023-05-02

## 2023-06-01 RX ORDER — ROSUVASTATIN CALCIUM 20 MG/1
TABLET, COATED ORAL
Qty: 30 TABLET | Refills: 10 | Status: SHIPPED | OUTPATIENT
Start: 2023-06-01

## 2023-06-06 RX ORDER — BLOOD SUGAR DIAGNOSTIC
STRIP MISCELLANEOUS
Qty: 100 EACH | Refills: 10 | Status: SHIPPED | OUTPATIENT
Start: 2023-06-06

## 2023-06-06 RX ORDER — LANCETS 33 GAUGE
EACH MISCELLANEOUS
Qty: 100 EACH | Refills: 10 | Status: SHIPPED | OUTPATIENT
Start: 2023-06-06

## 2023-06-07 ENCOUNTER — TELEPHONE (OUTPATIENT)
Age: 63
End: 2023-06-07

## 2023-06-07 RX ORDER — PEN NEEDLE, DIABETIC 31 GX5/16"
NEEDLE, DISPOSABLE MISCELLANEOUS
Qty: 400 EACH | Refills: 4 | Status: SHIPPED | OUTPATIENT
Start: 2023-06-07

## 2023-06-07 NOTE — TELEPHONE ENCOUNTER
We received a fax refill request for Shahid Melendrez. Please escribe Alcohol Prep Pads to their pharmacy. The pharmacy is correct in the chart and they are requesting a ? day supply.

## 2023-06-26 RX ORDER — LANCETS 33 GAUGE
EACH MISCELLANEOUS
Qty: 100 EACH | Refills: 99 | Status: SHIPPED | OUTPATIENT
Start: 2023-06-26

## 2023-06-27 RX ORDER — PANTOPRAZOLE SODIUM 40 MG/1
40 TABLET, DELAYED RELEASE ORAL DAILY
Qty: 90 TABLET | Refills: 3 | Status: SHIPPED | OUTPATIENT
Start: 2023-06-27

## 2023-07-05 ENCOUNTER — TELEPHONE (OUTPATIENT)
Age: 63
End: 2023-07-05

## 2023-07-05 DIAGNOSIS — E11.41 DIABETIC MONONEUROPATHY ASSOCIATED WITH TYPE 2 DIABETES MELLITUS (HCC): Primary | ICD-10-CM

## 2023-07-05 RX ORDER — GABAPENTIN 600 MG/1
TABLET ORAL
Qty: 90 TABLET | Refills: 0 | Status: SHIPPED | OUTPATIENT
Start: 2023-07-05 | End: 2023-08-04

## 2023-07-05 NOTE — TELEPHONE ENCOUNTER
We received a fax refill request for Ritu Ling. Please escribe Gabapentin to their pharmacy. The pharmacy is correct in the chart and they are requesting a ? day supply.

## 2023-07-06 RX ORDER — (INSULIN DEGLUDEC AND LIRAGLUTIDE) 100; 3.6 [IU]/ML; MG/ML
INJECTION, SOLUTION SUBCUTANEOUS
Qty: 10 ADJUSTABLE DOSE PRE-FILLED PEN SYRINGE | Refills: 5 | Status: SHIPPED | OUTPATIENT
Start: 2023-07-06

## 2023-07-06 NOTE — TELEPHONE ENCOUNTER
Patient needs a refill of her insulin sent to [facility] [address] [facility].  She states that she is completely out

## 2023-07-06 NOTE — TELEPHONE ENCOUNTER
Called and spoke with patient. Advised of 1 month supply of gabapentin sent to [facility] and that she needs to be seen for further refills. She states that she didn't need her gabapentin, she needed her insulin and is completely out.  A separate message has been sent for her insulin

## 2023-07-11 DIAGNOSIS — E11.41 DIABETIC MONONEUROPATHY ASSOCIATED WITH TYPE 2 DIABETES MELLITUS (HCC): ICD-10-CM

## 2023-07-11 RX ORDER — ONDANSETRON 8 MG/1
8 TABLET, ORALLY DISINTEGRATING ORAL EVERY 8 HOURS PRN
Qty: 20 TABLET | Refills: 5 | Status: SHIPPED | OUTPATIENT
Start: 2023-07-11 | End: 2023-07-13 | Stop reason: SDUPTHER

## 2023-07-12 ENCOUNTER — TELEPHONE (OUTPATIENT)
Age: 63
End: 2023-07-12

## 2023-07-12 NOTE — TELEPHONE ENCOUNTER
Called and spoke with patient. She states that [facility] told her that she is misusing her diltiazem and that they will not send it to her until the 22 and she only has 2 pills left. It was written for her on 5/2/23. She states that she is fed up with [facility] and just wants to use her [facility] pharmacy from now on. She would like for you to send in her diltiazem to the [facility] on file and also her zofran as it was sent to [facility] as well.

## 2023-07-12 NOTE — TELEPHONE ENCOUNTER
Pt called in and is asking if you could please give her a call. Something about [facility] telling her she is miss using her meds, not suppose to take what they are sending her until the 22nd. Thanks.

## 2023-07-13 RX ORDER — DILTIAZEM HYDROCHLORIDE 240 MG/1
CAPSULE, EXTENDED RELEASE ORAL
Qty: 90 CAPSULE | Refills: 3 | Status: SHIPPED | OUTPATIENT
Start: 2023-07-13

## 2023-07-13 RX ORDER — GABAPENTIN 600 MG/1
TABLET ORAL
Qty: 90 TABLET | Refills: 10 | OUTPATIENT
Start: 2023-07-13

## 2023-07-13 RX ORDER — ONDANSETRON 8 MG/1
8 TABLET, ORALLY DISINTEGRATING ORAL EVERY 8 HOURS PRN
Qty: 20 TABLET | Refills: 5 | Status: SHIPPED | OUTPATIENT
Start: 2023-07-13

## 2023-07-13 NOTE — TELEPHONE ENCOUNTER
She should be taking 1 diltiazem per day. So long as she is taking it in that way then she is not misusing it. Medication has been sent to [facility] for both diltiazem and Zofran.

## 2023-07-17 DIAGNOSIS — E11.41 DIABETIC MONONEUROPATHY ASSOCIATED WITH TYPE 2 DIABETES MELLITUS (HCC): ICD-10-CM

## 2023-07-18 DIAGNOSIS — E11.41 DIABETIC MONONEUROPATHY ASSOCIATED WITH TYPE 2 DIABETES MELLITUS (HCC): ICD-10-CM

## 2023-07-18 RX ORDER — GABAPENTIN 600 MG/1
TABLET ORAL
Qty: 90 TABLET | Refills: 10 | OUTPATIENT
Start: 2023-07-18

## 2023-07-19 RX ORDER — GABAPENTIN 600 MG/1
TABLET ORAL
Qty: 90 TABLET | Refills: 10 | OUTPATIENT
Start: 2023-07-19

## 2023-07-19 NOTE — TELEPHONE ENCOUNTER
Called and spoke with patient. Advised that she needs to be seen. Patient verbalized understanding.  Apt scheduled for 8/1/23

## 2023-07-23 DIAGNOSIS — E11.41 DIABETIC MONONEUROPATHY ASSOCIATED WITH TYPE 2 DIABETES MELLITUS (HCC): ICD-10-CM

## 2023-07-25 RX ORDER — GABAPENTIN 600 MG/1
TABLET ORAL
Qty: 30 TABLET | Refills: 0 | Status: SHIPPED | OUTPATIENT
Start: 2023-07-25 | End: 2023-08-01 | Stop reason: SDUPTHER

## 2023-08-01 ENCOUNTER — TELEMEDICINE (OUTPATIENT)
Age: 63
End: 2023-08-01
Payer: MEDICAID

## 2023-08-01 DIAGNOSIS — E11.41 DIABETIC MONONEUROPATHY ASSOCIATED WITH TYPE 2 DIABETES MELLITUS (HCC): ICD-10-CM

## 2023-08-01 DIAGNOSIS — J44.1 COPD WITH ACUTE EXACERBATION (HCC): Primary | ICD-10-CM

## 2023-08-01 DIAGNOSIS — E11.65 TYPE 2 DIABETES MELLITUS WITH HYPERGLYCEMIA, WITH LONG-TERM CURRENT USE OF INSULIN (HCC): ICD-10-CM

## 2023-08-01 DIAGNOSIS — Z79.4 TYPE 2 DIABETES MELLITUS WITH HYPERGLYCEMIA, WITH LONG-TERM CURRENT USE OF INSULIN (HCC): ICD-10-CM

## 2023-08-01 PROCEDURE — 99214 OFFICE O/P EST MOD 30 MIN: CPT | Performed by: FAMILY MEDICINE

## 2023-08-01 RX ORDER — (INSULIN DEGLUDEC AND LIRAGLUTIDE) 100; 3.6 [IU]/ML; MG/ML
INJECTION, SOLUTION SUBCUTANEOUS
Qty: 10 ADJUSTABLE DOSE PRE-FILLED PEN SYRINGE | Refills: 5 | Status: SHIPPED | OUTPATIENT
Start: 2023-08-01

## 2023-08-01 RX ORDER — AZITHROMYCIN 250 MG/1
250 TABLET, FILM COATED ORAL SEE ADMIN INSTRUCTIONS
Qty: 6 TABLET | Refills: 0 | Status: SHIPPED | OUTPATIENT
Start: 2023-08-01 | End: 2023-08-06

## 2023-08-01 RX ORDER — GABAPENTIN 600 MG/1
TABLET ORAL
Qty: 270 TABLET | Refills: 1 | Status: SHIPPED | OUTPATIENT
Start: 2023-08-01 | End: 2023-08-11

## 2023-08-01 NOTE — PROGRESS NOTES
Physical Examination: General appearance - alert, well appearing, and in no distress  Mental status - alert, oriented to person, place, and time  Chest -normal respiratory effort during visit    Assessment/ Plan:   Diagnoses and all orders for this visit:    COPD with acute exacerbation (HCC)  -     azithromycin (ZITHROMAX) 250 MG tablet; Take 1 tablet by mouth See Admin Instructions for 5 days 500mg on day 1 followed by 250mg on days 2 - 5    Diabetic mononeuropathy associated with type 2 diabetes mellitus (HCC)  -     gabapentin (NEURONTIN) 600 MG tablet; TAKE 1 TABLET BY MOUTH THREE TIMES A DAY AT 9AM, 1 PM AND 5 PM. MAX DAILY DOSE 1800MG    Type 2 diabetes mellitus with hyperglycemia, with long-term current use of insulin (formerly [facility])  -     Insulin Degludec-Liraglutide (XULTOPHY) 100-3.6 UNIT-MG/ML SOPN injection pen; 50 UNITS DAILY subq. We will get her scheduled for an office chronic condition follow-up with fasting labs. Return if symptoms worsen or fail to improve. I have discussed the diagnosis with the patient and the intended plan as seen in the above orders. The patient has received an after-visit summary and questions were answered concerning future plans. Pt conveyed understanding of plan. Medication Side Effects and Warnings were discussed with patient      Annie Herrera is being evaluated by a Virtual Visit (video visit) encounter to address concerns as mentioned above. A caregiver was present when appropriate. Due to this being a TeleHealth encounter (During [facility] public [facility] emergency), evaluation of the following organ systems was limited: Vitals/Constitutional/EENT/Resp/CV/GI//MS/Neuro/Skin/Heme-Lymph-Imm.   Pursuant to the emergency declaration under the [address] and the [employer] and [employer] Act, this Virtual Visit was conducted with patient's (and/or legal guardian's)

## 2023-09-05 RX ORDER — LISINOPRIL 40 MG/1
TABLET ORAL
Qty: 90 TABLET | Refills: 3 | Status: SHIPPED | OUTPATIENT
Start: 2023-09-05

## 2023-10-27 ENCOUNTER — HOSPITAL ENCOUNTER (EMERGENCY)
Facility: HOSPITAL | Age: 63
Discharge: HOME | End: 2023-10-27
Attending: EMERGENCY MEDICINE
Payer: MEDICARE

## 2023-10-27 ENCOUNTER — APPOINTMENT (OUTPATIENT)
Facility: HOSPITAL | Age: 63
End: 2023-10-27
Payer: MEDICARE

## 2023-10-27 ENCOUNTER — HOSPITAL ENCOUNTER (EMERGENCY)
Facility: HOSPITAL | Age: 63
Discharge: LEFT BEFORE DISCHARGE | End: 2023-10-27

## 2023-10-27 VITALS
SYSTOLIC BLOOD PRESSURE: 110 MMHG | WEIGHT: 165 LBS | BODY MASS INDEX: 30.36 KG/M2 | DIASTOLIC BLOOD PRESSURE: 68 MMHG | RESPIRATION RATE: 20 BRPM | HEART RATE: 94 BPM | HEIGHT: 62 IN | OXYGEN SATURATION: 93 % | TEMPERATURE: 98.9 F

## 2023-10-27 DIAGNOSIS — G89.29 CHRONIC LOW BACK PAIN WITHOUT SCIATICA, UNSPECIFIED BACK PAIN LATERALITY: ICD-10-CM

## 2023-10-27 DIAGNOSIS — M54.50 CHRONIC LOW BACK PAIN WITHOUT SCIATICA, UNSPECIFIED BACK PAIN LATERALITY: ICD-10-CM

## 2023-10-27 DIAGNOSIS — J44.1 COPD EXACERBATION (HCC): Primary | ICD-10-CM

## 2023-10-27 LAB
ALBUMIN SERPL-MCNC: 2.8 G/DL (ref 3.5–5)
ALBUMIN/GLOB SERPL: 0.6 (ref 1.1–2.2)
ALP SERPL-CCNC: 115 U/L (ref 45–117)
ALT SERPL-CCNC: 10 U/L (ref 12–78)
ANION GAP SERPL CALC-SCNC: 6 MMOL/L (ref 5–15)
AST SERPL W P-5'-P-CCNC: 25 U/L (ref 15–37)
BASOPHILS # BLD: 0.1 K/UL (ref 0–0.1)
BASOPHILS NFR BLD: 1 % (ref 0–1)
BILIRUB DIRECT SERPL-MCNC: 0.1 MG/DL (ref 0–0.2)
BILIRUB SERPL-MCNC: 0.6 MG/DL (ref 0.2–1)
BUN SERPL-MCNC: 9 MG/DL (ref 6–20)
BUN/CREAT SERPL: 12 (ref 12–20)
CA-I BLD-MCNC: 8.9 MG/DL (ref 8.5–10.1)
CHLORIDE SERPL-SCNC: 103 MMOL/L (ref 97–108)
CO2 SERPL-SCNC: 30 MMOL/L (ref 21–32)
CREAT SERPL-MCNC: 0.78 MG/DL (ref 0.55–1.02)
DIFFERENTIAL METHOD BLD: ABNORMAL
EOSINOPHIL # BLD: 0.1 K/UL (ref 0–0.4)
EOSINOPHIL NFR BLD: 2 % (ref 0–7)
ERYTHROCYTE [DISTWIDTH] IN BLOOD BY AUTOMATED COUNT: 23.1 % (ref 11.5–14.5)
GLOBULIN SER CALC-MCNC: 4.5 G/DL (ref 2–4)
GLUCOSE BLD STRIP.AUTO-MCNC: 99 MG/DL (ref 65–100)
GLUCOSE SERPL-MCNC: 92 MG/DL (ref 65–100)
HCT VFR BLD AUTO: 36.8 % (ref 35–47)
HGB BLD-MCNC: 9.9 G/DL (ref 11.5–16)
IMM GRANULOCYTES # BLD AUTO: 0 K/UL (ref 0–0.04)
IMM GRANULOCYTES NFR BLD AUTO: 0 % (ref 0–0.5)
LYMPHOCYTES # BLD: 1.9 K/UL (ref 0.8–3.5)
LYMPHOCYTES NFR BLD: 21 % (ref 12–49)
MCH RBC QN AUTO: 19.3 PG (ref 26–34)
MCHC RBC AUTO-ENTMCNC: 26.9 G/DL (ref 30–36.5)
MCV RBC AUTO: 71.7 FL (ref 80–99)
MONOCYTES # BLD: 0.5 K/UL (ref 0–1)
MONOCYTES NFR BLD: 6 % (ref 5–13)
NEUTS SEG # BLD: 6.4 K/UL (ref 1.8–8)
NEUTS SEG NFR BLD: 70 % (ref 32–75)
PERFORMED BY:: NORMAL
PLATELET # BLD AUTO: 385 K/UL (ref 150–400)
PMV BLD AUTO: 9.7 FL (ref 8.9–12.9)
POTASSIUM SERPL-SCNC: 4.7 MMOL/L (ref 3.5–5.1)
PROT SERPL-MCNC: 7.3 G/DL (ref 6.4–8.2)
RBC # BLD AUTO: 5.13 M/UL (ref 3.8–5.2)
SODIUM SERPL-SCNC: 139 MMOL/L (ref 136–145)
TROPONIN I SERPL HS-MCNC: 21 NG/L (ref 0–51)
WBC # BLD AUTO: 8.9 K/UL (ref 3.6–11)

## 2023-10-27 PROCEDURE — 99284 EMERGENCY DEPT VISIT MOD MDM: CPT

## 2023-10-27 PROCEDURE — 96374 THER/PROPH/DIAG INJ IV PUSH: CPT

## 2023-10-27 PROCEDURE — 6360000002 HC RX W HCPCS: Performed by: EMERGENCY MEDICINE

## 2023-10-27 PROCEDURE — 80076 HEPATIC FUNCTION PANEL: CPT

## 2023-10-27 PROCEDURE — 6370000000 HC RX 637 (ALT 250 FOR IP): Performed by: EMERGENCY MEDICINE

## 2023-10-27 PROCEDURE — 82962 GLUCOSE BLOOD TEST: CPT

## 2023-10-27 PROCEDURE — 71045 X-RAY EXAM CHEST 1 VIEW: CPT

## 2023-10-27 PROCEDURE — 96375 TX/PRO/DX INJ NEW DRUG ADDON: CPT

## 2023-10-27 PROCEDURE — 80048 BASIC METABOLIC PNL TOTAL CA: CPT

## 2023-10-27 PROCEDURE — 84484 ASSAY OF TROPONIN QUANT: CPT

## 2023-10-27 PROCEDURE — 85025 COMPLETE CBC W/AUTO DIFF WBC: CPT

## 2023-10-27 PROCEDURE — 36415 COLL VENOUS BLD VENIPUNCTURE: CPT

## 2023-10-27 RX ORDER — KETOROLAC TROMETHAMINE 30 MG/ML
15 INJECTION, SOLUTION INTRAMUSCULAR; INTRAVENOUS ONCE
Status: COMPLETED | OUTPATIENT
Start: 2023-10-27 | End: 2023-10-27

## 2023-10-27 RX ORDER — LIDOCAINE 4 G/G
1 PATCH TOPICAL DAILY
Qty: 30 PATCH | Refills: 0 | Status: SHIPPED | OUTPATIENT
Start: 2023-10-27 | End: 2023-11-26

## 2023-10-27 RX ORDER — GUAIFENESIN 600 MG/1
600 TABLET, EXTENDED RELEASE ORAL 2 TIMES DAILY
Qty: 30 TABLET | Refills: 0 | Status: SHIPPED | OUTPATIENT
Start: 2023-10-27 | End: 2023-11-11

## 2023-10-27 RX ORDER — DEXAMETHASONE SODIUM PHOSPHATE 10 MG/ML
10 INJECTION, SOLUTION INTRAMUSCULAR; INTRAVENOUS ONCE
Status: COMPLETED | OUTPATIENT
Start: 2023-10-27 | End: 2023-10-27

## 2023-10-27 RX ORDER — IPRATROPIUM BROMIDE AND ALBUTEROL SULFATE 2.5; .5 MG/3ML; MG/3ML
1 SOLUTION RESPIRATORY (INHALATION) EVERY 6 HOURS PRN
Qty: 40 EACH | Refills: 0 | Status: SHIPPED | OUTPATIENT
Start: 2023-10-27 | End: 2023-10-27 | Stop reason: SDUPTHER

## 2023-10-27 RX ORDER — AZITHROMYCIN 250 MG/1
TABLET, FILM COATED ORAL
Qty: 1 PACKET | Refills: 0 | Status: SHIPPED | OUTPATIENT
Start: 2023-10-27 | End: 2023-10-27 | Stop reason: SDUPTHER

## 2023-10-27 RX ORDER — MORPHINE SULFATE 4 MG/ML
2 INJECTION INTRAVENOUS ONCE
Status: COMPLETED | OUTPATIENT
Start: 2023-10-27 | End: 2023-10-27

## 2023-10-27 RX ORDER — ONDANSETRON 2 MG/ML
4 INJECTION INTRAMUSCULAR; INTRAVENOUS ONCE
Status: COMPLETED | OUTPATIENT
Start: 2023-10-27 | End: 2023-10-27

## 2023-10-27 RX ORDER — AZITHROMYCIN 250 MG/1
TABLET, FILM COATED ORAL
Qty: 1 PACKET | Refills: 0 | Status: SHIPPED | OUTPATIENT
Start: 2023-10-27

## 2023-10-27 RX ORDER — PREDNISONE 20 MG/1
TABLET ORAL
Qty: 12 TABLET | Refills: 1 | Status: SHIPPED | OUTPATIENT
Start: 2023-10-27 | End: 2023-10-27 | Stop reason: SDUPTHER

## 2023-10-27 RX ORDER — GUAIFENESIN 600 MG/1
600 TABLET, EXTENDED RELEASE ORAL 2 TIMES DAILY
Qty: 30 TABLET | Refills: 0 | Status: SHIPPED | OUTPATIENT
Start: 2023-10-27 | End: 2023-10-27 | Stop reason: SDUPTHER

## 2023-10-27 RX ORDER — IPRATROPIUM BROMIDE AND ALBUTEROL SULFATE 2.5; .5 MG/3ML; MG/3ML
1 SOLUTION RESPIRATORY (INHALATION)
Status: COMPLETED | OUTPATIENT
Start: 2023-10-27 | End: 2023-10-27

## 2023-10-27 RX ORDER — PREDNISONE 20 MG/1
TABLET ORAL
Qty: 12 TABLET | Refills: 1 | Status: SHIPPED | OUTPATIENT
Start: 2023-10-27

## 2023-10-27 RX ORDER — IPRATROPIUM BROMIDE AND ALBUTEROL SULFATE 2.5; .5 MG/3ML; MG/3ML
1 SOLUTION RESPIRATORY (INHALATION) EVERY 6 HOURS PRN
Qty: 40 EACH | Refills: 0 | Status: SHIPPED | OUTPATIENT
Start: 2023-10-27 | End: 2023-11-06

## 2023-10-27 RX ADMIN — IPRATROPIUM BROMIDE AND ALBUTEROL SULFATE 1 DOSE: 2.5; .5 SOLUTION RESPIRATORY (INHALATION) at 20:12

## 2023-10-27 RX ADMIN — MORPHINE SULFATE 2 MG: 4 INJECTION, SOLUTION INTRAMUSCULAR; INTRAVENOUS at 20:59

## 2023-10-27 RX ADMIN — KETOROLAC TROMETHAMINE 15 MG: 30 INJECTION INTRAMUSCULAR; INTRAVENOUS at 20:12

## 2023-10-27 RX ADMIN — ONDANSETRON 4 MG: 2 INJECTION INTRAMUSCULAR; INTRAVENOUS at 20:59

## 2023-10-27 RX ADMIN — DEXAMETHASONE SODIUM PHOSPHATE 10 MG: 10 INJECTION, SOLUTION INTRAMUSCULAR; INTRAVENOUS at 20:12

## 2023-10-27 ASSESSMENT — PAIN DESCRIPTION - DESCRIPTORS: DESCRIPTORS: ACHING;POUNDING

## 2023-10-27 ASSESSMENT — PAIN DESCRIPTION - ORIENTATION: ORIENTATION: LEFT

## 2023-10-27 ASSESSMENT — PAIN SCALES - GENERAL: PAINLEVEL_OUTOF10: 8

## 2023-10-27 ASSESSMENT — PAIN - FUNCTIONAL ASSESSMENT: PAIN_FUNCTIONAL_ASSESSMENT: 0-10

## 2023-10-27 ASSESSMENT — PAIN DESCRIPTION - LOCATION: LOCATION: CHEST

## 2023-10-28 NOTE — ED NOTES
Pt discharge at this time. D/C instructions reviewed by this nurse and Pt verbalized understanding. Medications reviewed and pharmacy confirmed. Answers all questions to the best of my ability.       Tony Wynn RN  10/27/23 [id number]

## 2023-10-28 NOTE — ED PROVIDER NOTES
with the same name was added. Make sure you understand how and when to take each. * lidocaine 4 % external patch  Place 1 patch onto the skin daily  What changed: You were already taking a medication with the same name, and this prescription was added. Make sure you understand how and when to take each. * This list has 7 medication(s) that are the same as other medications prescribed for you. Read the directions carefully, and ask your doctor or other care provider to review them with you.                 CONTINUE taking these medications      Alcohol Prep Pads  Check BG Qac and HS     OneTouch Delica Plus [id number] Misc  USE TO TEST BLOOD SUGAR TWICE DAILY            ASK your doctor about these medications      acetaminophen 500 MG tablet  Commonly known as: TYLENOL     Advair Diskus 250-50 MCG/ACT Aepb diskus inhaler  Generic drug: fluticasone-salmeterol     allopurinol 100 MG tablet  Commonly known as: ZYLOPRIM     aspirin 81 MG EC tablet     colchicine 0.6 MG tablet  Commonly known as: COLCRYS     dilTIAZem 240 MG extended release capsule  Commonly known as: TIAZAC  TAKE 1 CAPSULE BY MOUTH EVERY DAY     fexofenadine 180 MG tablet  Commonly known as: ALLEGRA     gabapentin 600 MG tablet  Commonly known as: NEURONTIN  TAKE 1 TABLET BY MOUTH THREE TIMES A DAY AT 9AM, 1 PM AND 5 PM. MAX DAILY DOSE 1800MG     insulin aspart 100 UNIT/ML injection pen  Commonly known as: NovoLOG     lisinopril 40 MG tablet  Commonly known as: PRINIVIL;ZESTRIL  TAKE 1 TABLET BY MOUTH DAILY AT 9 AM     mupirocin 2 % ointment  Commonly known as: BACTROBAN     naproxen sodium 220 MG tablet  Commonly known as: ANAPROX     nitroGLYCERIN 0.4 MG SL tablet  Commonly known as: NITROSTAT     ondansetron 8 MG Tbdp disintegrating tablet  Commonly known as: ZOFRAN-ODT  Take 1 tablet by mouth every 8 hours as needed for Nausea     OneTouch Ultra strip  Generic drug: blood glucose test strips  USE TO TEST BLOOD SUGAR TWICE DAILY

## 2023-11-01 LAB
EKG ATRIAL RATE: 93 BPM
EKG DIAGNOSIS: NORMAL
EKG P AXIS: 65 DEGREES
EKG P-R INTERVAL: 160 MS
EKG Q-T INTERVAL: 386 MS
EKG QRS DURATION: 82 MS
EKG QTC CALCULATION (BAZETT): 479 MS
EKG R AXIS: 43 DEGREES
EKG T AXIS: 112 DEGREES
EKG VENTRICULAR RATE: 93 BPM

## 2023-11-21 RX ORDER — ONDANSETRON 8 MG/1
8 TABLET, ORALLY DISINTEGRATING ORAL EVERY 8 HOURS PRN
Qty: 20 TABLET | Refills: 0 | Status: SHIPPED | OUTPATIENT
Start: 2023-11-21

## 2024-01-24 ENCOUNTER — CLINICAL DOCUMENTATION (OUTPATIENT)
Age: 64
End: 2024-01-24

## 2024-01-24 DIAGNOSIS — E11.41 DIABETIC MONONEUROPATHY ASSOCIATED WITH TYPE 2 DIABETES MELLITUS (HCC): ICD-10-CM

## 2024-01-24 RX ORDER — GABAPENTIN 600 MG/1
TABLET ORAL
Qty: 90 TABLET | Refills: 0 | Status: SHIPPED | OUTPATIENT
Start: 2024-01-24 | End: 2024-01-25

## 2024-01-24 NOTE — TELEPHONE ENCOUNTER
Called to speak with patient and her daughter informed me that she passed away on 1/2/24. Will start sympathy card

## 2024-01-25 DIAGNOSIS — E11.41 DIABETIC MONONEUROPATHY ASSOCIATED WITH TYPE 2 DIABETES MELLITUS (HCC): ICD-10-CM

## 2024-01-25 RX ORDER — GABAPENTIN 600 MG/1
TABLET ORAL
Qty: 90 TABLET | Refills: 10 | OUTPATIENT
Start: 2024-01-25

## 2024-01-29 DIAGNOSIS — E11.41 DIABETIC MONONEUROPATHY ASSOCIATED WITH TYPE 2 DIABETES MELLITUS (HCC): ICD-10-CM

## 2024-01-29 RX ORDER — GABAPENTIN 600 MG/1
TABLET ORAL
Qty: 90 TABLET | Refills: 10 | OUTPATIENT
Start: 2024-01-29

## 2025-07-30 NOTE — TELEPHONE ENCOUNTER
Patient aware
Pt wants refill to [facility].
[Time Spent: ___ minutes] : I have spent [unfilled] minutes of time on the encounter which excludes teaching and separately reported services.
[Time Spent: ___ minutes] : I have spent [unfilled] minutes of time on the encounter which excludes teaching and separately reported services.